# Patient Record
Sex: MALE | Race: BLACK OR AFRICAN AMERICAN | NOT HISPANIC OR LATINO | Employment: FULL TIME | URBAN - NONMETROPOLITAN AREA
[De-identification: names, ages, dates, MRNs, and addresses within clinical notes are randomized per-mention and may not be internally consistent; named-entity substitution may affect disease eponyms.]

---

## 2017-02-21 RX ORDER — OMEPRAZOLE 40 MG/1
CAPSULE, DELAYED RELEASE ORAL
Qty: 30 CAPSULE | Refills: 0 | Status: SHIPPED | OUTPATIENT
Start: 2017-02-21 | End: 2017-03-22 | Stop reason: SDUPTHER

## 2017-03-22 RX ORDER — OMEPRAZOLE 40 MG/1
CAPSULE, DELAYED RELEASE ORAL
Qty: 30 CAPSULE | Refills: 0 | Status: SHIPPED | OUTPATIENT
Start: 2017-03-22 | End: 2017-04-21 | Stop reason: SDUPTHER

## 2017-04-21 RX ORDER — OMEPRAZOLE 40 MG/1
CAPSULE, DELAYED RELEASE ORAL
Qty: 30 CAPSULE | Refills: 0 | Status: SHIPPED | OUTPATIENT
Start: 2017-04-21 | End: 2017-05-02

## 2017-05-24 RX ORDER — OMEPRAZOLE 40 MG/1
CAPSULE, DELAYED RELEASE ORAL
Qty: 30 CAPSULE | Refills: 0 | Status: SHIPPED | OUTPATIENT
Start: 2017-05-24 | End: 2017-06-23 | Stop reason: SDUPTHER

## 2017-06-19 ENCOUNTER — OFFICE VISIT (OUTPATIENT)
Dept: FAMILY MEDICINE CLINIC | Facility: CLINIC | Age: 52
End: 2017-06-19

## 2017-06-19 VITALS
DIASTOLIC BLOOD PRESSURE: 80 MMHG | BODY MASS INDEX: 30.31 KG/M2 | TEMPERATURE: 98.2 F | HEART RATE: 72 BPM | SYSTOLIC BLOOD PRESSURE: 120 MMHG | WEIGHT: 262 LBS | HEIGHT: 78 IN

## 2017-06-19 DIAGNOSIS — I10 ESSENTIAL HYPERTENSION: ICD-10-CM

## 2017-06-19 DIAGNOSIS — E83.42 HYPOMAGNESEMIA: ICD-10-CM

## 2017-06-19 DIAGNOSIS — E86.0 DEHYDRATION, MILD: ICD-10-CM

## 2017-06-19 DIAGNOSIS — F17.200 TOBACCO DEPENDENCE SYNDROME: ICD-10-CM

## 2017-06-19 DIAGNOSIS — I48.0 PAROXYSMAL ATRIAL FIBRILLATION (HCC): Primary | ICD-10-CM

## 2017-06-19 LAB
ALBUMIN SERPL-MCNC: 4.4 G/DL (ref 3.2–5.5)
ALBUMIN/GLOB SERPL: 1.2 G/DL (ref 1–3)
ALP SERPL-CCNC: 73 U/L (ref 15–121)
ALT SERPL W P-5'-P-CCNC: 38 U/L (ref 10–60)
ANION GAP SERPL CALCULATED.3IONS-SCNC: 9 MMOL/L (ref 5–15)
AST SERPL-CCNC: 27 U/L (ref 10–60)
BILIRUB SERPL-MCNC: 0.4 MG/DL (ref 0.2–1)
BUN BLD-MCNC: 17 MG/DL (ref 8–25)
BUN/CREAT SERPL: 14.2 (ref 7–25)
CALCIUM SPEC-SCNC: 10.3 MG/DL (ref 8.4–10.8)
CHLORIDE SERPL-SCNC: 101 MMOL/L (ref 100–112)
CO2 SERPL-SCNC: 28 MMOL/L (ref 20–32)
CREAT BLD-MCNC: 1.2 MG/DL (ref 0.4–1.3)
GFR SERPL CREATININE-BSD FRML MDRD: 78 ML/MIN/1.73 (ref 56–130)
GLOBULIN UR ELPH-MCNC: 3.6 GM/DL (ref 2.5–4.6)
GLUCOSE BLD-MCNC: 101 MG/DL (ref 70–100)
MAGNESIUM SERPL-MCNC: 1.8 MG/DL (ref 1.8–2.5)
POTASSIUM BLD-SCNC: 4.1 MMOL/L (ref 3.4–5.4)
PROT SERPL-MCNC: 8 G/DL (ref 6.7–8.2)
SODIUM BLD-SCNC: 138 MMOL/L (ref 134–146)

## 2017-06-19 PROCEDURE — 83735 ASSAY OF MAGNESIUM: CPT | Performed by: INTERNAL MEDICINE

## 2017-06-19 PROCEDURE — 36415 COLL VENOUS BLD VENIPUNCTURE: CPT | Performed by: INTERNAL MEDICINE

## 2017-06-19 PROCEDURE — 80053 COMPREHEN METABOLIC PANEL: CPT | Performed by: INTERNAL MEDICINE

## 2017-06-19 PROCEDURE — 99214 OFFICE O/P EST MOD 30 MIN: CPT | Performed by: INTERNAL MEDICINE

## 2017-06-19 RX ORDER — DILTIAZEM HYDROCHLORIDE 120 MG/1
120 CAPSULE, COATED, EXTENDED RELEASE ORAL DAILY
COMMUNITY
End: 2017-08-02 | Stop reason: DRUGHIGH

## 2017-06-19 RX ORDER — VALSARTAN 320 MG/1
320 TABLET ORAL DAILY
COMMUNITY
End: 2018-09-21 | Stop reason: ALTCHOICE

## 2017-06-19 NOTE — PATIENT INSTRUCTIONS
Steps to Quit Smoking   Smoking tobacco can be harmful to your health and can affect almost every organ in your body. Smoking puts you, and those around you, at risk for developing many serious chronic diseases. Quitting smoking is difficult, but it is one of the best things that you can do for your health. It is never too late to quit.  WHAT ARE THE BENEFITS OF QUITTING SMOKING?  When you quit smoking, you lower your risk of developing serious diseases and conditions, such as:  · Lung cancer or lung disease, such as COPD.  · Heart disease.  · Stroke.  · Heart attack.  · Infertility.  · Osteoporosis and bone fractures.  Additionally, symptoms such as coughing, wheezing, and shortness of breath may get better when you quit. You may also find that you get sick less often because your body is stronger at fighting off colds and infections. If you are pregnant, quitting smoking can help to reduce your chances of having a baby of low birth weight.  HOW DO I GET READY TO QUIT?  When you decide to quit smoking, create a plan to make sure that you are successful. Before you quit:  · Pick a date to quit. Set a date within the next two weeks to give you time to prepare.  · Write down the reasons why you are quitting. Keep this list in places where you will see it often, such as on your bathroom mirror or in your car or wallet.  · Identify the people, places, things, and activities that make you want to smoke (triggers) and avoid them. Make sure to take these actions:    Throw away all cigarettes at home, at work, and in your car.    Throw away smoking accessories, such as ashtrays and lighters.    Clean your car and make sure to empty the ashtray.    Clean your home, including curtains and carpets.  · Tell your family, friends, and coworkers that you are quitting. Support from your loved ones can make quitting easier.  · Talk with your health care provider about your options for quitting smoking.  · Find out what treatment  "options are covered by your health insurance.  WHAT STRATEGIES CAN I USE TO QUIT SMOKING?   Talk with your healthcare provider about different strategies to quit smoking. Some strategies include:  · Quitting smoking altogether instead of gradually lessening how much you smoke over a period of time. Research shows that quitting \"cold turkey\" is more successful than gradually quitting.  · Attending in-person counseling to help you build problem-solving skills. You are more likely to have success in quitting if you attend several counseling sessions. Even short sessions of 10 minutes can be effective.  · Finding resources and support systems that can help you to quit smoking and remain smoke-free after you quit. These resources are most helpful when you use them often. They can include:    Online chats with a counselor.    Telephone quitlines.    Printed self-help materials.    Support groups or group counseling.    Text messaging programs.    Mobile phone applications.  · Taking medicines to help you quit smoking. (If you are pregnant or breastfeeding, talk with your health care provider first.) Some medicines contain nicotine and some do not. Both types of medicines help with cravings, but the medicines that include nicotine help to relieve withdrawal symptoms. Your health care provider may recommend:    Nicotine patches, gum, or lozenges.    Nicotine inhalers or sprays.    Non-nicotine medicine that is taken by mouth.  Talk with your health care provider about combining strategies, such as taking medicines while you are also receiving in-person counseling. Using these two strategies together makes you more likely to succeed in quitting than if you used either strategy on its own.  If you are pregnant or breastfeeding, talk with your health care provider about finding counseling or other support strategies to quit smoking. Do not take medicine to help you quit smoking unless told to do so by your health care " provider.  WHAT THINGS CAN I DO TO MAKE IT EASIER TO QUIT?  Quitting smoking might feel overwhelming at first, but there is a lot that you can do to make it easier. Take these important actions:  · Reach out to your family and friends and ask that they support and encourage you during this time. Call telephone quitlines, reach out to support groups, or work with a counselor for support.  · Ask people who smoke to avoid smoking around you.  · Avoid places that trigger you to smoke, such as bars, parties, or smoke-break areas at work.  · Spend time around people who do not smoke.  · Lessen stress in your life, because stress can be a smoking trigger for some people. To lessen stress, try:    Exercising regularly.    Deep-breathing exercises.    Yoga.    Meditating.    Performing a body scan. This involves closing your eyes, scanning your body from head to toe, and noticing which parts of your body are particularly tense. Purposefully relax the muscles in those areas.  · Download or purchase mobile phone or tablet apps (applications) that can help you stick to your quit plan by providing reminders, tips, and encouragement. There are many free apps, such as QuitGuide from the CDC (Centers for Disease Control and Prevention). You can find other support for quitting smoking (smoking cessation) through smokefree.gov and other websites.  HOW WILL I FEEL WHEN I QUIT SMOKING?  Within the first 24 hours of quitting smoking, you may start to feel some withdrawal symptoms. These symptoms are usually most noticeable 2-3 days after quitting, but they usually do not last beyond 2-3 weeks. Changes or symptoms that you might experience include:  · Mood swings.  · Restlessness, anxiety, or irritation.  · Difficulty concentrating.  · Dizziness.  · Strong cravings for sugary foods in addition to nicotine.  · Mild weight gain.  · Constipation.  · Nausea.  · Coughing or a sore throat.  · Changes in how your medicines work in your  body.  · A depressed mood.  · Difficulty sleeping (insomnia).  After the first 2-3 weeks of quitting, you may start to notice more positive results, such as:  · Improved sense of smell and taste.  · Decreased coughing and sore throat.  · Slower heart rate.  · Lower blood pressure.  · Clearer skin.  · The ability to breathe more easily.  · Fewer sick days.  Quitting smoking is very challenging for most people. Do not get discouraged if you are not successful the first time. Some people need to make many attempts to quit before they achieve long-term success. Do your best to stick to your quit plan, and talk with your health care provider if you have any questions or concerns.     This information is not intended to replace advice given to you by your health care provider. Make sure you discuss any questions you have with your health care provider.     Document Released: 12/12/2002 Document Revised: 05/03/2016 Document Reviewed: 05/03/2016  [employer] Interactive Patient Education ©2017 [employer]

## 2017-06-19 NOTE — PROGRESS NOTES
Subjective        History of Present Illness     Praveen Noonan is a 52 y.o. male who is here for a hospital follow up.  He was treated for paroxsymal atrial fibrillation with RVR/dehydration.  He was discharged 06/12/17 on Cardizem  mg daily after receiving IV fluids and Cardizem drip.  He reports his rate has stayed in his 70s since discharge.  He states his job is stressful and he works in extreme [place].  He has appointments schedules with Dr. Aguila on August 7th and Dr. Hillman scheduled for June 26th.  He reports Dr. Hillman stopped his Zocor.  I asked him to verify that he wanted him to discontinue his statin medication.   He reports hypomagnesemia as well.      He reports he is 4 or 5 cigarettes daily.  I advised the patient of the risks in continuing to use tobacco products.  Patient is urged to stop smoking and never start again. I encourage him to pick a stop date and stop completely.         Review of Systems   Constitutional: Negative for chills, fatigue and fever.   HENT: Negative for congestion, ear pain, postnasal drip, sinus pressure and sore throat.    Respiratory: Negative for cough, shortness of breath and wheezing.    Cardiovascular: Negative for chest pain, palpitations and leg swelling.   Gastrointestinal: Negative for abdominal pain, blood in stool, constipation, diarrhea, nausea and vomiting.   Endocrine: Negative for cold intolerance, heat intolerance, polydipsia and polyuria.   Genitourinary: Negative for dysuria, frequency, hematuria and urgency.   Skin: Negative for rash.   Neurological: Negative for syncope and weakness.      PHQ-9 Depression Screening 6/19/2017   Little interest or pleasure in doing things 0   Feeling down, depressed, or hopeless 0   Trouble falling or staying asleep, or sleeping too much 0   Feeling tired or having little energy 0   Poor appetite or overeating 0   Feeling bad about yourself - or that you are a failure or have let yourself or your family  "down 0   Trouble concentrating on things, such as reading the newspaper or watching television 0   Moving or speaking so slowly that other people could have noticed. Or the opposite - being so fidgety or restless that you have been moving around a lot more than usual 0   Thoughts that you would be better off dead, or of hurting yourself in some way 0   PHQ-9 Total Score 0   If you checked off any problems, how difficult have these problems made it for you to do your work, take care of things at home, or get along with other people? Not difficult at all       Objective     Vitals:    06/19/17 0824   BP: 120/80   Pulse: 72   Temp: 98.2 °F (36.8 °C)   TempSrc: Oral   Weight: 262 lb (119 kg)   Height: 78\" (198.1 cm)       Physical Exam   Constitutional: He is oriented to person, place, and time. He appears well-developed and well-nourished. No distress.   HENT:   Head: Normocephalic and atraumatic.   Nose: Right sinus exhibits no maxillary sinus tenderness and no frontal sinus tenderness. Left sinus exhibits no maxillary sinus tenderness and no frontal sinus tenderness.   Mouth/Throat: Uvula is midline, oropharynx is clear and moist and mucous membranes are normal. No oral lesions. No tonsillar exudate.   Eyes: Conjunctivae and EOM are normal. Pupils are equal, round, and reactive to light.   Neck: Trachea normal. Neck supple. No JVD present. Carotid bruit is not present. No tracheal deviation present. No thyroid mass and no thyromegaly present.   Cardiovascular: Normal rate, regular rhythm and normal heart sounds.   No extrasystoles are present. PMI is not displaced.    No murmur heard.  Pulmonary/Chest: Effort normal and breath sounds normal. No accessory muscle usage. No respiratory distress. He has no decreased breath sounds. He has no wheezes. He has no rhonchi. He has no rales.   Abdominal: Soft. Bowel sounds are normal. He exhibits no distension. There is no hepatosplenomegaly. There is no tenderness.       " Vascular Status -  His exam exhibits right foot vasculature normal. His exam exhibits no right foot edema. His exam exhibits left foot vasculature normal. His exam exhibits no left foot edema.  Lymphadenopathy:     He has no cervical adenopathy.   Neurological: He is alert and oriented to person, place, and time. No cranial nerve deficit. Coordination normal.   Skin: Skin is warm, dry and intact. No rash noted. No cyanosis. Nails show no clubbing.   Psychiatric: He has a normal mood and affect. His speech is normal and behavior is normal. Thought content normal.   Vitals reviewed.    Future Appointments  Date Time Provider Department Center   6/19/2017 8:50 AM [facility] None   9/12/2017 9:00 AM Juarez Angel MD [facility] None       Assessment/Plan      He will stop by the lab today to have a CMP and magnesium only to re-evaluate renal function and hypomagnesemia.      I asked him to verify with Dr. Hillman, cardiologist, about stopping his statin medication.      He will return to work tomorrow.  I recommended he make sure he stay hydrated well.  He reports he alternates Gatorade/water.  He will continue this.      I advised the patient of the risks in continuing to use tobacco products.  Patient is urged to stop smoking and never start again.     He will return in September for follow up on chronic conditions with fasting labs one week prior.      Scribed for Dr. Angel by Vandana Gomes [facility].     Diagnoses and all orders for this visit:    Paroxysmal atrial fibrillation  -     Comprehensive Metabolic Panel  -     Magnesium    Essential hypertension  -     Comprehensive Metabolic Panel  -     Magnesium    Tobacco dependence syndrome    Dehydration, mild  -     Comprehensive Metabolic Panel  -     Magnesium    Hypomagnesemia  -     Comprehensive Metabolic Panel  -     Magnesium    Other orders  -     diltiaZEM CD (CARTIA XT) 120 MG 24 hr capsule; Take 120 mg by mouth Daily.  -     valsartan  (DIOVAN) 320 MG tablet; Take 320 mg by mouth Daily.      No visits with results within 3 Week(s) from this visit.  Latest known visit with results is:    Hospital Outpatient Visit on 09/06/2016   Component Date Value Ref Range Status   • WBC 09/06/2016 6.7  3.2 - 9.8 x1000/uL Final   • RBC 09/06/2016 4.62  4.37 - 5.74 christina/mm3 Final   • Hemoglobin 09/06/2016 14.2  13.7 - 17.3 gm/dl Final   • Hematocrit 09/06/2016 41.8  39.0 - 49.0 % Final   • MCV 09/06/2016 90.5  80.0 - 98.0 fl Final   • MCH 09/06/2016 30.7  26.0 - 34.0 pg Final   • MCHC 09/06/2016 34.0  31.5 - 36.3 gm/dl Final   • Platelets 09/06/2016 212  150 - 450 x1000/mm3 Final   • RDW 09/06/2016 13.8  11.5 - 14.5 % Final   • MPV 09/06/2016 9.1  8.0 - 12.0 fl Final   • Neutrophil Rel % 09/06/2016 32.4* 37.0 - 80.0 % Final   • Lymphocyte Rel % 09/06/2016 54.1* 10.0 - 50.0 % Final   • Monocyte Rel % 09/06/2016 10.1  0.0 - 12.0 % Final   • Eosinophil Rel % 09/06/2016 2.8  0.0 - 7.0 % Final   • Basophil Rel % 09/06/2016 0.6  0.0 - 2.0 % Final   • nRBC 09/06/2016 0   Final   • nRBC 09/06/2016 0   Final   • Total Cholesterol 09/06/2016 156  150 - 200 mg/dl Final    CHOL DESIRED: < 200 MG/DL   • Triglycerides 09/06/2016 72  35 - 160 mg/dl Final    TRIG DESIRED: < 200 MG/DL   • HDL Cholesterol 09/06/2016 33.8* 35.0 - 100.0 mg/dl Final    HDL HIGH RISK: < 35 MG/DL   • LDL Cholesterol  09/06/2016 108  mg/dl Final    Comment: LDL DESIRED: < 130 MG/DL  LDL DESIRED: < 130 MG/DL     • Glucose 09/06/2016 109* 70.0 - 100.0 mg/dl Final   • BUN 09/06/2016 18  8.0 - 25.0 mg/dl Final   • Creatinine 09/06/2016 1.1  0.4 - 1.3 mg/dl Final   • Sodium 09/06/2016 139.0  134 - 146 mmol/L Final   • Potassium 09/06/2016 3.6  3.4 - 5.4 mmol/L Final   • Chloride 09/06/2016 103.0  100.0 - 112.0 mmol/L Final   • CO2 09/06/2016 29.0  20.0 - 32.0 mmol/L Final   • Calcium 09/06/2016 9.8  8.4 - 10.8 mg/dl Final   • Total Protein 09/06/2016 7.6  6.7 - 8.2 gm/dl Final   • Albumin 09/06/2016 4.0  3.2  - 5.5 gm/dl Final   • Total Bilirubin 09/06/2016 0.6  0.2 - 1.0 mg/dl Final   • Alkaline Phosphatase 09/06/2016 96  15 - 121 U/L Final   • ALT (SGPT) 09/06/2016 27  10 - 60 U/L Final   • AST (SGOT) 09/06/2016 25  10 - 60 U/L Final   • GFR MDRD Non  09/06/2016 71  63 - 147 mL/min/1.73 sq.M Final    Comment: Invalid if creatinine is changing or the patient is on dialysis. Use AA  result if patient is -American, non AA result otherwise.     • GFR MDRD  09/06/2016 86  63 - 147 mL/min/1.73 sq.M Final   • Anion Gap 09/06/2016 7.0  5.0 - 15.0 mmol/L Final   • Neutrophil Rel % 09/06/2016 35* 37 - 80 % Final   • Lymphocyte Rel % 09/06/2016 52* 10 - 50 % Final   • Monocyte Rel % 09/06/2016 9  0 - 12 % Final   • Eosinophil Rel % 09/06/2016 2  0 - 7 % Final   • Basophil Rel % 09/06/2016 1  0 - 2 % Final   • Atypical Lymphocytes Rel % 09/06/2016 1* 0 - 0 % Final   • RBC Morphology 09/06/2016 Normocytic Normochromic   Final   • Platelet Estimate 09/06/2016 Normal   Final   • Total Counted 09/06/2016 100   Final   ]

## 2017-06-23 RX ORDER — OMEPRAZOLE 40 MG/1
CAPSULE, DELAYED RELEASE ORAL
Qty: 30 CAPSULE | Refills: 0 | Status: SHIPPED | OUTPATIENT
Start: 2017-06-23 | End: 2017-07-24 | Stop reason: SDUPTHER

## 2017-07-24 RX ORDER — OMEPRAZOLE 40 MG/1
CAPSULE, DELAYED RELEASE ORAL
Qty: 30 CAPSULE | Refills: 11 | Status: SHIPPED | OUTPATIENT
Start: 2017-07-24 | End: 2018-07-27 | Stop reason: SDUPTHER

## 2017-08-02 ENCOUNTER — OFFICE VISIT (OUTPATIENT)
Dept: FAMILY MEDICINE CLINIC | Facility: CLINIC | Age: 52
End: 2017-08-02

## 2017-08-02 VITALS
HEIGHT: 78 IN | WEIGHT: 267.4 LBS | DIASTOLIC BLOOD PRESSURE: 70 MMHG | HEART RATE: 80 BPM | BODY MASS INDEX: 30.94 KG/M2 | SYSTOLIC BLOOD PRESSURE: 120 MMHG | TEMPERATURE: 98.2 F

## 2017-08-02 DIAGNOSIS — N52.03 COMBINED ARTERIAL INSUFFICIENCY AND CORPORO-VENOUS OCCLUSIVE ERECTILE DYSFUNCTION: ICD-10-CM

## 2017-08-02 DIAGNOSIS — M17.0 PRIMARY OSTEOARTHRITIS OF BOTH KNEES: Primary | Chronic | ICD-10-CM

## 2017-08-02 DIAGNOSIS — I10 ESSENTIAL HYPERTENSION: Chronic | ICD-10-CM

## 2017-08-02 DIAGNOSIS — I48.0 PAROXYSMAL ATRIAL FIBRILLATION (HCC): Chronic | ICD-10-CM

## 2017-08-02 DIAGNOSIS — K21.9 GASTROESOPHAGEAL REFLUX DISEASE WITHOUT ESOPHAGITIS: Chronic | ICD-10-CM

## 2017-08-02 PROBLEM — G47.33 OBSTRUCTIVE SLEEP APNEA SYNDROME: Chronic | Status: ACTIVE | Noted: 2017-08-02

## 2017-08-02 PROCEDURE — 99213 OFFICE O/P EST LOW 20 MIN: CPT | Performed by: INTERNAL MEDICINE

## 2017-08-02 RX ORDER — SILDENAFIL CITRATE 20 MG/1
TABLET ORAL
Qty: 15 TABLET | Refills: 6 | Status: SHIPPED | OUTPATIENT
Start: 2017-08-02 | End: 2022-03-14 | Stop reason: DRUGHIGH

## 2017-08-02 RX ORDER — DILTIAZEM HYDROCHLORIDE 180 MG/1
1 CAPSULE, EXTENDED RELEASE ORAL DAILY
Refills: 1 | COMMUNITY
Start: 2017-07-27 | End: 2018-09-21 | Stop reason: DRUGHIGH

## 2017-08-02 RX ORDER — NAPROXEN 375 MG/1
375 TABLET ORAL 2 TIMES DAILY WITH MEALS
Qty: 40 TABLET | Refills: 0 | Status: SHIPPED | OUTPATIENT
Start: 2017-08-02 | End: 2018-05-23

## 2017-08-02 RX ORDER — DIGOXIN 250 MCG
1 TABLET ORAL DAILY
Refills: 1 | COMMUNITY
Start: 2017-07-27 | End: 2019-04-08

## 2017-08-02 NOTE — PROGRESS NOTES
Subjective        History of Present Illness     Praveen Noonan is a 52 y.o. male with history of osteoarthritis of the knees.  He is reporting gradually improving right knee pain and right lower extremity edema, which started on Sunday after he built a deck on the house.  He was doing a lot of squatting.  Pain and swelling symptoms have been improving for the past 2 days    He was seen by Dr. Gil, pulmonologist, for symptoms of sleep apnea.  A home sleep study was done two nights ago, but the test was incomplete due to the pulse ox not fitting properly.  He will need to repeat the home sleep study test.      He was seen by Dr. Hillman, cardiologist, last month for his paroxysmal a-fib.  He is on Xarelto.   I had asked him to verify with Dr. Hillman, cardiologist, about stopping his statin medication.   He forgot to ask about this.  He has an appointment next month with labs a week prior.  We will re-evaluate lipid panel next month.    He is reporting symptoms of erectile dysfunction.  He is having difficulty obtaining erection.  He is given a prescription for sidenafil.  He is to notify myself or Dr. Hillman for any chest pain symptoms during intercourse.              Review of Systems   Constitutional: Negative for chills, fatigue and fever.   HENT: Negative for congestion, ear pain, postnasal drip, sinus pressure and sore throat.    Respiratory: Negative for cough, shortness of breath and wheezing.    Cardiovascular: Negative for chest pain, palpitations and leg swelling.   Gastrointestinal: Negative for abdominal pain, blood in stool, constipation, diarrhea, nausea and vomiting.   Endocrine: Negative for cold intolerance, heat intolerance, polydipsia and polyuria.   Genitourinary: Negative for dysuria, frequency, hematuria and urgency.   Musculoskeletal:        Right knee pain.    Skin: Negative for rash.   Neurological: Negative for syncope and weakness.        Objective     Vitals:    08/02/17 1519   BP:  "120/70   Pulse: 80   Temp: 98.2 °F (36.8 °C)   TempSrc: Oral   Weight: 267 lb 6.4 oz (121 kg)   Height: 78\" (198.1 cm)     Physical Exam   Constitutional: He is oriented to person, place, and time. He appears well-developed and well-nourished. No distress.   Neck: Neck supple. No JVD present. No thyromegaly present.   Cardiovascular: Normal rate, regular rhythm and normal heart sounds.    Pulmonary/Chest: Effort normal and breath sounds normal. No accessory muscle usage. No respiratory distress. He has no wheezes. He has no rales.   Abdominal: Soft. Bowel sounds are normal. He exhibits no distension. There is no tenderness.   Musculoskeletal: He exhibits no edema.   Exam of the right knee reveals negative drawer sign.  Crepitus of the right knee.  No medial or lateral instability.  No quadriceps inhibition.  Fullness in the popliteal area, which may be consistent with an early/mild Baker's cyst.  There is some trace edema distal to the right knee extending down to the ankle, which he reports to be improving.     Lymphadenopathy:     He has no cervical adenopathy.   Neurological: He is alert and oriented to person, place, and time. No cranial nerve deficit.   Psychiatric: He has a normal mood and affect. His speech is normal and behavior is normal.     Future Appointments  Date Time Provider Department Center   9/12/2017 9:00 AM Juarez Angel MD [facility] None       Assessment/Plan      He is given a prescription for short course of Naproxen 375 mg to take 1 tablet by mouth 1-2 (Two) Times a Day With Meals for a short interval, only until the current pain and swelling is improved.  Elevation and ice for symptom relief.  Avoid frequent or chronic use of NSAIDs due to also being on Xarelto.  Continue omeprazole for control of GERD symptoms, particularly while taking the naproxen sodium.    He is given a prescription for sildenafil (REVATIO) 20 MG tablet to take 3 tablets 1 hour prior to intercourse. Notify myself or " Dr. Hillman should he experience any chest pain with intercourse.       He will repeat the home sleep study.      Continue his current blood pressure medications.  Continue to pursue sodium restriction and the additional weight loss.    Return to clinic soon as previous as scheduled with fasting labs prior.     Scribed for Dr. Angel by Vandana Gomes [facility].     Diagnoses and all orders for this visit:    Primary osteoarthritis of both knees -right worse than left    Essential hypertension    Paroxysmal atrial fibrillation - S/P pathway ablation.  Dr. Hillman follows    Gastroesophageal reflux disease without esophagitis    Other orders  -     digoxin (LANOXIN) 250 MCG tablet; Take 1 tablet by mouth Daily.  -     CARTIA  MG 24 hr capsule; Take 1 capsule by mouth Daily.  -     naproxen (NAPROSYN) 375 MG tablet; Take 1 tablet by mouth 2 (Two) Times a Day With Meals.  -     sildenafil (REVATIO) 20 MG tablet; 3 tablets 1 hour prior to intercourse      No visits with results within 3 Week(s) from this visit.  Latest known visit with results is:    Office Visit on 06/19/2017   Component Date Value Ref Range Status   • Glucose 06/19/2017 101* 70 - 100 mg/dL Final   • BUN 06/19/2017 17  8 - 25 mg/dL Final   • Creatinine 06/19/2017 1.20  0.40 - 1.30 mg/dL Final   • Sodium 06/19/2017 138  134 - 146 mmol/L Final   • Potassium 06/19/2017 4.1  3.4 - 5.4 mmol/L Final   • Chloride 06/19/2017 101  100 - 112 mmol/L Final   • CO2 06/19/2017 28.0  20.0 - 32.0 mmol/L Final   • Calcium 06/19/2017 10.3  8.4 - 10.8 mg/dL Final   • Total Protein 06/19/2017 8.0  6.7 - 8.2 g/dL Final   • Albumin 06/19/2017 4.40  3.20 - 5.50 g/dL Final   • ALT (SGPT) 06/19/2017 38  10 - 60 U/L Final   • AST (SGOT) 06/19/2017 27  10 - 60 U/L Final   • Alkaline Phosphatase 06/19/2017 73  15 - 121 U/L Final   • Total Bilirubin 06/19/2017 0.4  0.2 - 1.0 mg/dL Final   • eGFR   Amer 06/19/2017 78  56 - 130 mL/min/1.73 Final   • Globulin 06/19/2017 3.6   2.5 - 4.6 gm/dL Final   • A/G Ratio 06/19/2017 1.2  1.0 - 3.0 g/dL Final   • BUN/Creatinine Ratio 06/19/2017 14.2  7.0 - 25.0 Final   • Anion Gap 06/19/2017 9.0  5.0 - 15.0 mmol/L Final   • Magnesium 06/19/2017 1.8  1.8 - 2.5 mg/dL Final   ]

## 2017-09-12 ENCOUNTER — LAB (OUTPATIENT)
Dept: LAB | Facility: OTHER | Age: 52
End: 2017-09-12

## 2017-09-12 DIAGNOSIS — E78.5 HYPERLIPIDEMIA: ICD-10-CM

## 2017-09-12 DIAGNOSIS — Z12.5 SPECIAL SCREENING FOR MALIGNANT NEOPLASM OF PROSTATE: ICD-10-CM

## 2017-09-12 LAB
ALBUMIN SERPL-MCNC: 3.9 G/DL (ref 3.2–5.5)
ALBUMIN/GLOB SERPL: 1.2 G/DL (ref 1–3)
ALP SERPL-CCNC: 96 U/L (ref 15–121)
ALT SERPL W P-5'-P-CCNC: 41 U/L (ref 10–60)
ANION GAP SERPL CALCULATED.3IONS-SCNC: 9 MMOL/L (ref 5–15)
AST SERPL-CCNC: 30 U/L (ref 10–60)
BASOPHILS # BLD AUTO: 0.05 10*3/MM3 (ref 0–0.2)
BASOPHILS NFR BLD AUTO: 0.6 % (ref 0–2)
BILIRUB SERPL-MCNC: 0.5 MG/DL (ref 0.2–1)
BUN BLD-MCNC: 17 MG/DL (ref 8–25)
BUN/CREAT SERPL: 14.2 (ref 7–25)
CALCIUM SPEC-SCNC: 9.6 MG/DL (ref 8.4–10.8)
CHLORIDE SERPL-SCNC: 102 MMOL/L (ref 100–112)
CHOLEST SERPL-MCNC: 218 MG/DL (ref 150–200)
CO2 SERPL-SCNC: 28 MMOL/L (ref 20–32)
CREAT BLD-MCNC: 1.2 MG/DL (ref 0.4–1.3)
DEPRECATED RDW RBC AUTO: 45.5 FL (ref 35.1–43.9)
EOSINOPHIL # BLD AUTO: 0.19 10*3/MM3 (ref 0–0.7)
EOSINOPHIL NFR BLD AUTO: 2.4 % (ref 0–7)
ERYTHROCYTE [DISTWIDTH] IN BLOOD BY AUTOMATED COUNT: 13.6 % (ref 11.5–14.5)
GFR SERPL CREATININE-BSD FRML MDRD: 78 ML/MIN/1.73 (ref 56–130)
GLOBULIN UR ELPH-MCNC: 3.3 GM/DL (ref 2.5–4.6)
GLUCOSE BLD-MCNC: 117 MG/DL (ref 70–100)
HCT VFR BLD AUTO: 40.2 % (ref 39–49)
HDLC SERPL-MCNC: 28 MG/DL (ref 35–100)
HGB BLD-MCNC: 13.3 G/DL (ref 13.7–17.3)
LDLC SERPL CALC-MCNC: 151 MG/DL
LDLC/HDLC SERPL: 5.41 {RATIO}
LYMPHOCYTES # BLD AUTO: 3.37 10*3/MM3 (ref 0.6–4.2)
LYMPHOCYTES NFR BLD AUTO: 41.8 % (ref 10–50)
MCH RBC QN AUTO: 30.9 PG (ref 26.5–34)
MCHC RBC AUTO-ENTMCNC: 33.1 G/DL (ref 31.5–36.3)
MCV RBC AUTO: 93.5 FL (ref 80–98)
MONOCYTES # BLD AUTO: 0.83 10*3/MM3 (ref 0–0.9)
MONOCYTES NFR BLD AUTO: 10.3 % (ref 0–12)
NEUTROPHILS # BLD AUTO: 3.63 10*3/MM3 (ref 2–8.6)
NEUTROPHILS NFR BLD AUTO: 44.9 % (ref 37–80)
PLATELET # BLD AUTO: 297 10*3/MM3 (ref 150–450)
PMV BLD AUTO: 8.6 FL (ref 8–12)
POTASSIUM BLD-SCNC: 4.1 MMOL/L (ref 3.4–5.4)
PROT SERPL-MCNC: 7.2 G/DL (ref 6.7–8.2)
PSA SERPL-MCNC: 0.13 NG/ML (ref 0–4)
RBC # BLD AUTO: 4.3 10*6/MM3 (ref 4.37–5.74)
SODIUM BLD-SCNC: 139 MMOL/L (ref 134–146)
TRIGL SERPL-MCNC: 193 MG/DL (ref 35–160)
VLDLC SERPL-MCNC: 38.6 MG/DL
WBC NRBC COR # BLD: 8.07 10*3/MM3 (ref 3.2–9.8)

## 2017-09-12 PROCEDURE — 80061 LIPID PANEL: CPT | Performed by: INTERNAL MEDICINE

## 2017-09-12 PROCEDURE — 84153 ASSAY OF PSA TOTAL: CPT | Performed by: INTERNAL MEDICINE

## 2017-09-12 PROCEDURE — 80053 COMPREHEN METABOLIC PANEL: CPT | Performed by: INTERNAL MEDICINE

## 2017-09-12 PROCEDURE — 36415 COLL VENOUS BLD VENIPUNCTURE: CPT | Performed by: INTERNAL MEDICINE

## 2017-09-12 PROCEDURE — 85025 COMPLETE CBC W/AUTO DIFF WBC: CPT | Performed by: INTERNAL MEDICINE

## 2017-09-19 ENCOUNTER — OFFICE VISIT (OUTPATIENT)
Dept: FAMILY MEDICINE CLINIC | Facility: CLINIC | Age: 52
End: 2017-09-19

## 2017-09-19 VITALS
DIASTOLIC BLOOD PRESSURE: 80 MMHG | TEMPERATURE: 97.8 F | BODY MASS INDEX: 31.12 KG/M2 | SYSTOLIC BLOOD PRESSURE: 136 MMHG | HEIGHT: 78 IN | WEIGHT: 269 LBS | HEART RATE: 80 BPM

## 2017-09-19 DIAGNOSIS — Z12.5 SPECIAL SCREENING FOR MALIGNANT NEOPLASM OF PROSTATE: ICD-10-CM

## 2017-09-19 DIAGNOSIS — I10 ESSENTIAL HYPERTENSION: Chronic | ICD-10-CM

## 2017-09-19 DIAGNOSIS — M17.0 PRIMARY OSTEOARTHRITIS OF BOTH KNEES: Chronic | ICD-10-CM

## 2017-09-19 DIAGNOSIS — K21.9 GASTROESOPHAGEAL REFLUX DISEASE WITHOUT ESOPHAGITIS: Chronic | ICD-10-CM

## 2017-09-19 DIAGNOSIS — F17.200 TOBACCO DEPENDENCE SYNDROME: Chronic | ICD-10-CM

## 2017-09-19 DIAGNOSIS — E78.2 MIXED HYPERLIPIDEMIA: Primary | Chronic | ICD-10-CM

## 2017-09-19 DIAGNOSIS — R73.01 IFG (IMPAIRED FASTING GLUCOSE): Chronic | ICD-10-CM

## 2017-09-19 DIAGNOSIS — G47.33 OBSTRUCTIVE SLEEP APNEA SYNDROME: Chronic | ICD-10-CM

## 2017-09-19 DIAGNOSIS — E66.09 NON MORBID OBESITY DUE TO EXCESS CALORIES: Chronic | ICD-10-CM

## 2017-09-19 DIAGNOSIS — I48.0 PAROXYSMAL ATRIAL FIBRILLATION (HCC): Chronic | ICD-10-CM

## 2017-09-19 PROCEDURE — 99214 OFFICE O/P EST MOD 30 MIN: CPT | Performed by: INTERNAL MEDICINE

## 2017-09-19 RX ORDER — ATORVASTATIN CALCIUM 10 MG/1
10 TABLET, FILM COATED ORAL DAILY
Qty: 30 TABLET | Refills: 11 | Status: SHIPPED | OUTPATIENT
Start: 2017-09-19 | End: 2018-09-18 | Stop reason: SDUPTHER

## 2017-09-19 NOTE — PROGRESS NOTES
Subjective        History of Present Illness     Praveen Noonan is a 52 y.o. male here for annual follow up on hyperlipidemia, hypertension, tobacco dependence syndrome, and GERD. His cardiologist is Dr. Hillman who follows his paroxysmal a-fib, for which he continues on Xarelto.  He also follows with his electrophysiologist. GERD symptoms mostly adequately controlled with Prilosec with only occasional flares.      He reports his chronic osteoarthritc knee pain is relieved temporarily with applying ice.  He has been out of his naproxen, but he did notice an improvement in pain when he was taking it.  Due to being on chronic Xarelto, I recommended OTC naproxen 220 mg having him monitor for lower extremity edema.         He has decreased tobacco use and is currently smoking five cigarettes daily.  I encouraged him to go ahead and pick a stop date and completely stop smoking and never start again.  He could try some nicotine gum if needed.     I had asked him to verify with Dr. Hillman, cardiologist, about stopping his statin medication, Lipitor.  He forgot to ask about his cardiologist about this at his last cardiology visit, but review of his lipid panel reveals cholesterol is not adequately controlled without the statin.      He was seen by Dr. Gil, pulmonologist, for symptoms of sleep apnea.  A home sleep study was attempted three times, but the test was incomplete due to the pulse ox not fitting properly.  He is going to need to schedule an inpatient sleep study for accurate results.     He has regained the 21 pounds he had lost with last year's annual visit.  His weight is up 22 pounds in the past year.  Blood pressure is at goal.        The patient's relevant past medical, surgical, and social history was reviewed in Epic.   Lab results are reviewed with the patient today.  Fasting glucose 117.  Total cholesterol 218.  HDL 28.  .  Triglycerides 193. PSA normal limits. Liver function normal.  Renal  "function stable.       Review of Systems   Constitutional: Negative for chills, fatigue and fever.   HENT: Negative for congestion, ear pain, postnasal drip, sinus pressure and sore throat.    Respiratory: Negative for cough, shortness of breath and wheezing.    Cardiovascular: Negative for chest pain, palpitations and leg swelling.   Gastrointestinal: Negative for abdominal pain, blood in stool, constipation, diarrhea, nausea and vomiting.   Endocrine: Negative for cold intolerance, heat intolerance, polydipsia and polyuria.   Genitourinary: Negative for dysuria, frequency, hematuria and urgency.   Skin: Negative for rash.   Neurological: Negative for syncope and weakness.        Objective     Vitals:    09/19/17 1501   BP: 136/80   Pulse: 80   Temp: 97.8 °F (36.6 °C)   TempSrc: Oral   Weight: 269 lb (122 kg)   Height: 78\" (198.1 cm)     Physical Exam   Constitutional: He is oriented to person, place, and time. He appears well-developed and well-nourished. No distress.   Obese male.    HENT:   Head: Normocephalic and atraumatic.   Nose: Right sinus exhibits no maxillary sinus tenderness and no frontal sinus tenderness. Left sinus exhibits no maxillary sinus tenderness and no frontal sinus tenderness.   Mouth/Throat: Uvula is midline, oropharynx is clear and moist and mucous membranes are normal. No oral lesions. No tonsillar exudate.   Benign posterior smoker's changes.      Eyes: Conjunctivae and EOM are normal. Pupils are equal, round, and reactive to light.   Neck: Trachea normal. Neck supple. No JVD present. Carotid bruit is not present. No tracheal deviation present. No thyroid mass and no thyromegaly present.   Cardiovascular: Normal rate, regular rhythm and normal heart sounds.   No extrasystoles are present. PMI is not displaced.    No murmur heard.  Pulmonary/Chest: Effort normal and breath sounds normal. No accessory muscle usage. No respiratory distress. He has no decreased breath sounds. He has no " wheezes. He has no rhonchi. He has no rales.   Chronic lung sounds.    Abdominal: Soft. Bowel sounds are normal. He exhibits no distension. There is no hepatosplenomegaly. There is no tenderness.   Obese abdomen limits exam.        Vascular Status -  His exam exhibits right foot vasculature normal. His exam exhibits no right foot edema. His exam exhibits left foot vasculature normal. His exam exhibits no left foot edema.  Lymphadenopathy:     He has no cervical adenopathy.   Neurological: He is alert and oriented to person, place, and time. No cranial nerve deficit. Coordination normal.   Skin: Skin is warm, dry and intact. No rash noted. No cyanosis. Nails show no clubbing.   Psychiatric: He has a normal mood and affect. His speech is normal and behavior is normal. Thought content normal.   Vitals reviewed.      Assessment/Plan      We will have him restart Lipitor (atorvastatin) 10 mg one tablet daily.     Continue omeprazole 40 mg every morning.    Continue follow-up appointments with Dr. blount, cardiology.      I advised the patient of the risks in continuing to use tobacco products.  Patient is urged to stop smoking and never start again. He has cut back and is only smoking about five cigarettes daily.  I encouraged him to go ahead and pick a stop date and completely stop smoking and never start again.  He can use nicotine gum for cravings.      I recommended OTC naproxen 220 mg to take one to two tablets daily sparingly as needed for osteoarthritic knee pain.  Monitor for lower extremity edema.      Intensify diet, exercise, and weight loss efforts, especially reducing carbohydrates in the diet.  Written literature regarding diet, exercise, and weight loss included in patient's AVS today.       Continue other medications and vitamin and mineral supplements to treat additional medical problems which we addressed today.  He will return in one year for annual exam with fasting labs one week prior.      Included  a digoxin level with next year's labs due to the patient being on rather high-dose digoxin.  He denies any current side effects.     Scribed for Dr. Angel by Vandana Gomes [facility].     Diagnoses and all orders for this visit:    Mixed hyperlipidemia - resumed Lipitor, 9/2017  -     Lipid Panel; Future    Essential hypertension  -     CBC Auto Differential; Future  -     Comprehensive Metabolic Panel; Future  -     Hemoglobin A1c; Future  -     Lipid Panel; Future    Paroxysmal atrial fibrillation - followed by Dr. Hillman  -     Digoxin Level; Future    Obstructive sleep apnea syndrome - followed by Dr. Gil    Non morbid obesity due to excess calories    Gastroesophageal reflux disease without esophagitis    Primary osteoarthritis of both knees    Tobacco dependence syndrome    Special screening for malignant neoplasm of prostate  -     PSA Screen; Future    IFG (impaired fasting glucose)  -     Hemoglobin A1c; Future    Other orders  -     atorvastatin (LIPITOR) 10 MG tablet; Take 1 tablet by mouth Daily. For cholesterol      Lab on 09/12/2017   Component Date Value Ref Range Status   • Total Cholesterol 09/12/2017 218* 150 - 200 mg/dL Final   • Triglycerides 09/12/2017 193* 35 - 160 mg/dL Final   • HDL Cholesterol 09/12/2017 28* 35 - 100 mg/dL Final   • LDL Cholesterol  09/12/2017 151  mg/dL Final   • VLDL Cholesterol 09/12/2017 38.6  mg/dL Final   • LDL/HDL Ratio 09/12/2017 5.41   Final   • PSA 09/12/2017 0.132  0.000 - 4.000 ng/mL Final   • Glucose 09/12/2017 117* 70 - 100 mg/dL Final   • BUN 09/12/2017 17  8 - 25 mg/dL Final   • Creatinine 09/12/2017 1.20  0.40 - 1.30 mg/dL Final   • Sodium 09/12/2017 139  134 - 146 mmol/L Final   • Potassium 09/12/2017 4.1  3.4 - 5.4 mmol/L Final   • Chloride 09/12/2017 102  100 - 112 mmol/L Final   • CO2 09/12/2017 28.0  20.0 - 32.0 mmol/L Final   • Calcium 09/12/2017 9.6  8.4 - 10.8 mg/dL Final   • Total Protein 09/12/2017 7.2  6.7 - 8.2 g/dL Final   • Albumin  09/12/2017 3.90  3.20 - 5.50 g/dL Final   • ALT (SGPT) 09/12/2017 41  10 - 60 U/L Final   • AST (SGOT) 09/12/2017 30  10 - 60 U/L Final   • Alkaline Phosphatase 09/12/2017 96  15 - 121 U/L Final   • Total Bilirubin 09/12/2017 0.5  0.2 - 1.0 mg/dL Final   • eGFR   Amer 09/12/2017 78  56 - 130 mL/min/1.73 Final   • Globulin 09/12/2017 3.3  2.5 - 4.6 gm/dL Final   • A/G Ratio 09/12/2017 1.2  1.0 - 3.0 g/dL Final   • BUN/Creatinine Ratio 09/12/2017 14.2  7.0 - 25.0 Final   • Anion Gap 09/12/2017 9.0  5.0 - 15.0 mmol/L Final   • WBC 09/12/2017 8.07  3.20 - 9.80 10*3/mm3 Final   • RBC 09/12/2017 4.30* 4.37 - 5.74 10*6/mm3 Final   • Hemoglobin 09/12/2017 13.3* 13.7 - 17.3 g/dL Final   • Hematocrit 09/12/2017 40.2  39.0 - 49.0 % Final   • MCV 09/12/2017 93.5  80.0 - 98.0 fL Final   • MCH 09/12/2017 30.9  26.5 - 34.0 pg Final   • MCHC 09/12/2017 33.1  31.5 - 36.3 g/dL Final   • RDW 09/12/2017 13.6  11.5 - 14.5 % Final   • RDW-SD 09/12/2017 45.5* 35.1 - 43.9 fl Final   • MPV 09/12/2017 8.6  8.0 - 12.0 fL Final   • Platelets 09/12/2017 297  150 - 450 10*3/mm3 Final   • Neutrophil % 09/12/2017 44.9  37.0 - 80.0 % Final   • Lymphocyte % 09/12/2017 41.8  10.0 - 50.0 % Final   • Monocyte % 09/12/2017 10.3  0.0 - 12.0 % Final   • Eosinophil % 09/12/2017 2.4  0.0 - 7.0 % Final   • Basophil % 09/12/2017 0.6  0.0 - 2.0 % Final   • Neutrophils, Absolute 09/12/2017 3.63  2.00 - 8.60 10*3/mm3 Final   • Lymphocytes, Absolute 09/12/2017 3.37  0.60 - 4.20 10*3/mm3 Final   • Monocytes, Absolute 09/12/2017 0.83  0.00 - 0.90 10*3/mm3 Final   • Eosinophils, Absolute 09/12/2017 0.19  0.00 - 0.70 10*3/mm3 Final   • Basophils, Absolute 09/12/2017 0.05  0.00 - 0.20 10*3/mm3 Final   ]

## 2017-09-19 NOTE — PATIENT INSTRUCTIONS
Exercising to Lose Weight  Exercising can help you to lose weight. In order to lose weight through exercise, you need to do vigorous-intensity exercise. You can tell that you are exercising with vigorous intensity if you are breathing very hard and fast and cannot hold a conversation while exercising.  Moderate-intensity exercise helps to maintain your current weight. You can tell that you are exercising at a moderate level if you have a higher heart rate and faster breathing, but you are still able to hold a conversation.  HOW OFTEN SHOULD I EXERCISE?  Choose an activity that you enjoy and set realistic goals. Your health care provider can help you to make an activity plan that works for you. Exercise regularly as directed by your health care provider. This may include:  · Doing resistance training twice each week, such as:    Push-ups.    Sit-ups.    Lifting weights.    Using resistance bands.  · Doing a given intensity of exercise for a given amount of time. Choose from these options:    150 minutes of moderate-intensity exercise every week.    75 minutes of vigorous-intensity exercise every week.    A mix of moderate-intensity and vigorous-intensity exercise every week.  Children, pregnant women, people who are out of shape, people who are overweight, and older adults may need to consult a health care provider for individual recommendations. If you have any sort of medical condition, be sure to consult your health care provider before starting a new exercise program.  WHAT ARE SOME ACTIVITIES THAT CAN HELP ME TO LOSE WEIGHT?   · Walking at a rate of at least 4.5 miles an hour.  · Jogging or running at a rate of 5 miles per hour.  · Biking at a rate of at least 10 miles per hour.  · Lap swimming.  · Roller-skating or in-line skating.  · Cross-country skiing.  · Vigorous competitive sports, such as football, basketball, and soccer.  · Jumping rope.  · Aerobic dancing.  HOW CAN I BE MORE ACTIVE IN MY DAY-TO-DAY  ACTIVITIES?  · Use the stairs instead of the elevator.  · Take a walk during your lunch break.  · If you drive, park your car farther away from work or school.  · If you take public transportation, get off one stop early and walk the rest of the way.  · Make all of your phone calls while standing up and walking around.  · Get up, stretch, and walk around every 30 minutes throughout the day.  WHAT GUIDELINES SHOULD I FOLLOW WHILE EXERCISING?  · Do not exercise so much that you hurt yourself, feel dizzy, or get very short of breath.  · Consult your health care provider prior to starting a new exercise program.  · Wear comfortable clothes and shoes with good support.  · Drink plenty of water while you exercise to prevent dehydration or heat stroke. Body water is lost during exercise and must be replaced.  · Work out until you breathe faster and your heart beats faster.     This information is not intended to replace advice given to you by your health care provider. Make sure you discuss any questions you have with your health care provider.     Document Released: 01/20/2012 Document Revised: 01/08/2016 Document Reviewed: 05/21/2015  [employer] Patient Education ©2017 [employer]      Calorie Counting for Weight Loss  Calories are energy you get from the things you eat and drink. Your body uses this energy to keep you going throughout the day. The number of calories you eat affects your weight. When you eat more calories than your body needs, your body stores the extra calories as fat. When you eat fewer calories than your body needs, your body burns fat to get the energy it needs.  Calorie counting means keeping track of how many calories you eat and drink each day. If you make sure to eat fewer calories than your body needs, you should lose weight. In order for calorie counting to work, you will need to eat the number of calories that are right for you in a day to lose a healthy amount of weight per week.  A healthy amount of weight to lose per week is usually 1-2 lb (0.5-0.9 kg). A dietitian can determine how many calories you need in a day and give you suggestions on how to reach your calorie goal.   WHAT IS MY MY PLAN?  My goal is to have __________ calories per day.   If I have this many calories per day, I should lose around __________ pounds per week.  WHAT DO I NEED TO KNOW ABOUT CALORIE COUNTING?  In order to meet your daily calorie goal, you will need to:  · Find out how many calories are in each food you would like to eat. Try to do this before you eat.  · Decide how much of the food you can eat.  · Write down what you ate and how many calories it had. Doing this is called keeping a food log.  WHERE DO I FIND CALORIE INFORMATION?  The number of calories in a food can be found on a Nutrition Facts label. Note that all the information on a label is based on a specific serving of the food. If a food does not have a Nutrition Facts label, try to look up the calories online or ask your dietitian for help.  HOW DO I DECIDE HOW MUCH TO EAT?  To decide how much of the food you can eat, you will need to consider both the number of calories in one serving and the size of one serving. This information can be found on the Nutrition Facts label. If a food does not have a Nutrition Facts label, look up the information online or ask your dietitian for help.  Remember that calories are listed per serving. If you choose to have more than one serving of a food, you will have to multiply the calories per serving by the amount of servings you plan to eat. For example, the label on a package of bread might say that a serving size is 1 slice and that there are 90 calories in a serving. If you eat 1 slice, you will have eaten 90 calories. If you eat 2 slices, you will have eaten 180 calories.  HOW DO I KEEP A FOOD LOG?  After each meal, record the following information in your food log:  · What you ate.  · How much of it you  ate.  · How many calories it had.  · Then, add up your calories.  Keep your food log near you, such as in a small notebook in your pocket. Another option is to use a mobile bird or website. Some programs will calculate calories for you and show you how many calories you have left each time you add an item to the log.  WHAT ARE SOME CALORIE COUNTING TIPS?  · Use your calories on foods and drinks that will fill you up and not leave you hungry. Some examples of this include foods like nuts and nut butters, vegetables, lean proteins, and high-fiber foods (more than 5 g fiber per serving).  · Eat nutritious foods and avoid empty calories. Empty calories are calories you get from foods or beverages that do not have many nutrients, such as candy and soda. It is better to have a nutritious high-calorie food (such as an avocado) than a food with few nutrients (such as a bag of chips).  · Know how many calories are in the foods you eat most often. This way, you do not have to look up how many calories they have each time you eat them.  · Look out for foods that may seem like low-calorie foods but are really high-calorie foods, such as baked goods, soda, and fat-free candy.  · Pay attention to calories in drinks. Drinks such as sodas, specialty coffee drinks, alcohol, and juices have a lot of calories yet do not fill you up. Choose low-calorie drinks like water and diet drinks.  · Focus your calorie counting efforts on higher calorie items. Logging the calories in a garden salad that contains only vegetables is less important than calculating the calories in a milk shake.  · Find a way of tracking calories that works for you. Get creative. Most people who are successful find ways to keep track of how much they eat in a day, even if they do not count every calorie.  WHAT ARE SOME PORTION CONTROL TIPS?  · Know how many calories are in a serving. This will help you know how many servings of a certain food you can have.  · Use a  measuring cup to measure serving sizes. This is helpful when you start out. With time, you will be able to estimate serving sizes for some foods.  · Take some time to put servings of different foods on your favorite plates, bowls, and cups so you know what a serving looks like.  · Try not to eat straight from a bag or box. Doing this can lead to overeating. Put the amount you would like to eat in a cup or on a plate to make sure you are eating the right portion.  · Use smaller plates, glasses, and bowls to prevent overeating. This is a quick and easy way to practice portion control. If your plate is smaller, less food can fit on it.  · Try not to multitask while eating, such as watching TV or using your computer. If it is time to eat, sit down at a table and enjoy your food. Doing this will help you to start recognizing when you are full. It will also make you more aware of what and how much you are eating.  HOW CAN I CALORIE COUNT WHEN EATING OUT?  · Ask for smaller portion sizes or child-sized portions.  · Consider sharing an entree and sides instead of getting your own entree.  · If you get your own entree, eat only half. Ask for a box at the beginning of your meal and put the rest of your entree in it so you are not tempted to eat it.  · Look for the calories on the menu. If calories are listed, choose the lower calorie options.  · Choose dishes that include vegetables, fruits, whole grains, low-fat dairy products, and lean protein. Focusing on smart food choices from each of the 5 food groups can help you stay on track at restaurants.  · Choose items that are boiled, broiled, grilled, or steamed.  · Choose water, milk, unsweetened iced tea, or other drinks without added sugars. If you want an alcoholic beverage, choose a lower calorie option. For example, a regular kiesha can have up to 700 calories and a glass of wine has around 150.  · Stay away from items that are buttered, battered, fried, or served with  "cream sauce. Items labeled \"crispy\" are usually fried, unless stated otherwise.  · Ask for dressings, sauces, and syrups on the side. These are usually very high in calories, so do not eat much of them.  · Watch out for salads. Many people think salads are a healthy option, but this is often not the case. Many salads come with chisholm, fried chicken, lots of cheese, fried chips, and dressing. All of these items have a lot of calories. If you want a salad, choose a garden salad and ask for grilled meats or steak. Ask for the dressing on the side, or ask for olive oil and vinegar or lemon to use as dressing.  · Estimate how many servings of a food you are given. For example, a serving of cooked rice is ½ cup or about the size of half a tennis ball or one cupcake wrapper. Knowing serving sizes will help you be aware of how much food you are eating at restaurants. The list below tells you how big or small some common portion sizes are based on everyday objects.    1 oz--4 stacked dice.    3 oz--1 deck of cards.    1 tsp--1 dice.    1 Tbsp--½ a Ping-Pong ball.    2 Tbsp--1 Ping-Pong ball.    ½ cup--1 tennis ball or 1 cupcake wrapper.    1 cup--1 baseball.     This information is not intended to replace advice given to you by your health care provider. Make sure you discuss any questions you have with your health care provider.     Document Released: 12/18/2006 Document Revised: 01/08/2016 Document Reviewed: 10/23/2014  [employer] Interactive Patient Education ©2017 [employer]    "

## 2018-07-27 RX ORDER — OMEPRAZOLE 40 MG/1
CAPSULE, DELAYED RELEASE ORAL
Qty: 60 CAPSULE | Refills: 0 | Status: SHIPPED | OUTPATIENT
Start: 2018-07-27 | End: 2018-09-28 | Stop reason: SDUPTHER

## 2018-09-14 ENCOUNTER — LAB (OUTPATIENT)
Dept: LAB | Facility: OTHER | Age: 53
End: 2018-09-14

## 2018-09-14 DIAGNOSIS — E78.2 MIXED HYPERLIPIDEMIA: Chronic | ICD-10-CM

## 2018-09-14 DIAGNOSIS — I48.0 PAROXYSMAL ATRIAL FIBRILLATION (HCC): Chronic | ICD-10-CM

## 2018-09-14 DIAGNOSIS — Z12.5 SPECIAL SCREENING FOR MALIGNANT NEOPLASM OF PROSTATE: ICD-10-CM

## 2018-09-14 DIAGNOSIS — R73.01 IFG (IMPAIRED FASTING GLUCOSE): Chronic | ICD-10-CM

## 2018-09-14 DIAGNOSIS — I10 ESSENTIAL HYPERTENSION: Chronic | ICD-10-CM

## 2018-09-14 LAB
ALBUMIN SERPL-MCNC: 4.2 G/DL (ref 3.5–5)
ALBUMIN/GLOB SERPL: 1.1 G/DL (ref 1.1–1.8)
ALP SERPL-CCNC: 103 U/L (ref 38–126)
ALT SERPL W P-5'-P-CCNC: 66 U/L
ANION GAP SERPL CALCULATED.3IONS-SCNC: 10 MMOL/L (ref 5–15)
AST SERPL-CCNC: 42 U/L (ref 17–59)
BASOPHILS # BLD AUTO: 0.05 10*3/MM3 (ref 0–0.2)
BASOPHILS NFR BLD AUTO: 0.6 % (ref 0–2)
BILIRUB SERPL-MCNC: 0.6 MG/DL (ref 0.2–1.3)
BUN BLD-MCNC: 18 MG/DL (ref 9–20)
BUN/CREAT SERPL: 17.8 (ref 7–25)
CALCIUM SPEC-SCNC: 10.1 MG/DL (ref 8.4–10.2)
CHLORIDE SERPL-SCNC: 101 MMOL/L (ref 98–107)
CHOLEST SERPL-MCNC: 187 MG/DL (ref 150–200)
CO2 SERPL-SCNC: 27 MMOL/L (ref 22–30)
CREAT BLD-MCNC: 1.01 MG/DL (ref 0.66–1.25)
DEPRECATED RDW RBC AUTO: 43.5 FL (ref 35.1–43.9)
DIGOXIN SERPL-MCNC: 0.9 NG/ML (ref 0.8–2)
EOSINOPHIL # BLD AUTO: 0.2 10*3/MM3 (ref 0–0.7)
EOSINOPHIL NFR BLD AUTO: 2.5 % (ref 0–7)
ERYTHROCYTE [DISTWIDTH] IN BLOOD BY AUTOMATED COUNT: 13.4 % (ref 11.5–14.5)
GFR SERPL CREATININE-BSD FRML MDRD: 94 ML/MIN/1.73 (ref 56–130)
GLOBULIN UR ELPH-MCNC: 3.7 GM/DL (ref 2.3–3.5)
GLUCOSE BLD-MCNC: 225 MG/DL (ref 74–99)
HBA1C MFR BLD: 12.6 % (ref 4–5.6)
HCT VFR BLD AUTO: 41.6 % (ref 39–49)
HDLC SERPL-MCNC: 29 MG/DL (ref 40–59)
HGB BLD-MCNC: 13.9 G/DL (ref 13.7–17.3)
LDLC SERPL CALC-MCNC: 117 MG/DL
LDLC/HDLC SERPL: 4.03 {RATIO} (ref 0–3.55)
LYMPHOCYTES # BLD AUTO: 3.96 10*3/MM3 (ref 0.6–4.2)
LYMPHOCYTES NFR BLD AUTO: 49.7 % (ref 10–50)
MCH RBC QN AUTO: 30.4 PG (ref 26.5–34)
MCHC RBC AUTO-ENTMCNC: 33.4 G/DL (ref 31.5–36.3)
MCV RBC AUTO: 91 FL (ref 80–98)
MONOCYTES # BLD AUTO: 0.89 10*3/MM3 (ref 0–0.9)
MONOCYTES NFR BLD AUTO: 11.2 % (ref 0–12)
NEUTROPHILS # BLD AUTO: 2.86 10*3/MM3 (ref 2–8.6)
NEUTROPHILS NFR BLD AUTO: 36 % (ref 37–80)
PLATELET # BLD AUTO: 266 10*3/MM3 (ref 150–450)
PMV BLD AUTO: 8.7 FL (ref 8–12)
POTASSIUM BLD-SCNC: 4.7 MMOL/L (ref 3.4–5)
PROT SERPL-MCNC: 7.9 G/DL (ref 6.3–8.2)
PSA SERPL-MCNC: 0.07 NG/ML (ref 0–4)
RBC # BLD AUTO: 4.57 10*6/MM3 (ref 4.37–5.74)
SODIUM BLD-SCNC: 138 MMOL/L (ref 137–145)
TRIGL SERPL-MCNC: 205 MG/DL
VLDLC SERPL-MCNC: 41 MG/DL
WBC NRBC COR # BLD: 7.96 10*3/MM3 (ref 3.2–9.8)

## 2018-09-14 PROCEDURE — 83036 HEMOGLOBIN GLYCOSYLATED A1C: CPT | Performed by: INTERNAL MEDICINE

## 2018-09-14 PROCEDURE — 36415 COLL VENOUS BLD VENIPUNCTURE: CPT | Performed by: INTERNAL MEDICINE

## 2018-09-14 PROCEDURE — 80162 ASSAY OF DIGOXIN TOTAL: CPT | Performed by: INTERNAL MEDICINE

## 2018-09-14 PROCEDURE — G0103 PSA SCREENING: HCPCS | Performed by: INTERNAL MEDICINE

## 2018-09-14 PROCEDURE — 80061 LIPID PANEL: CPT | Performed by: INTERNAL MEDICINE

## 2018-09-14 PROCEDURE — 80053 COMPREHEN METABOLIC PANEL: CPT | Performed by: INTERNAL MEDICINE

## 2018-09-14 PROCEDURE — 85025 COMPLETE CBC W/AUTO DIFF WBC: CPT | Performed by: INTERNAL MEDICINE

## 2018-09-20 RX ORDER — ATORVASTATIN CALCIUM 10 MG/1
TABLET, FILM COATED ORAL
Qty: 30 TABLET | Refills: 11 | Status: SHIPPED | OUTPATIENT
Start: 2018-09-20 | End: 2019-09-24 | Stop reason: SDUPTHER

## 2018-09-21 ENCOUNTER — OFFICE VISIT (OUTPATIENT)
Dept: FAMILY MEDICINE CLINIC | Facility: CLINIC | Age: 53
End: 2018-09-21

## 2018-09-21 VITALS
TEMPERATURE: 98.3 F | WEIGHT: 261 LBS | HEIGHT: 78 IN | HEART RATE: 72 BPM | DIASTOLIC BLOOD PRESSURE: 80 MMHG | BODY MASS INDEX: 30.2 KG/M2 | SYSTOLIC BLOOD PRESSURE: 138 MMHG

## 2018-09-21 DIAGNOSIS — F17.200 TOBACCO DEPENDENCE SYNDROME: Chronic | ICD-10-CM

## 2018-09-21 DIAGNOSIS — E78.2 MIXED HYPERLIPIDEMIA: Chronic | ICD-10-CM

## 2018-09-21 DIAGNOSIS — E11.9 TYPE 2 DIABETES MELLITUS WITHOUT COMPLICATION, WITHOUT LONG-TERM CURRENT USE OF INSULIN (HCC): Primary | Chronic | ICD-10-CM

## 2018-09-21 DIAGNOSIS — E66.09 CLASS 1 OBESITY DUE TO EXCESS CALORIES WITH SERIOUS COMORBIDITY AND BODY MASS INDEX (BMI) OF 30.0 TO 30.9 IN ADULT: Chronic | ICD-10-CM

## 2018-09-21 DIAGNOSIS — G47.33 OBSTRUCTIVE SLEEP APNEA SYNDROME: Chronic | ICD-10-CM

## 2018-09-21 DIAGNOSIS — Z12.5 SPECIAL SCREENING FOR MALIGNANT NEOPLASM OF PROSTATE: Chronic | ICD-10-CM

## 2018-09-21 DIAGNOSIS — I10 ESSENTIAL HYPERTENSION: Chronic | ICD-10-CM

## 2018-09-21 DIAGNOSIS — I48.0 PAROXYSMAL ATRIAL FIBRILLATION (HCC): Chronic | ICD-10-CM

## 2018-09-21 DIAGNOSIS — R73.9 HYPERGLYCEMIA: ICD-10-CM

## 2018-09-21 PROCEDURE — 99214 OFFICE O/P EST MOD 30 MIN: CPT | Performed by: INTERNAL MEDICINE

## 2018-09-21 RX ORDER — GLIMEPIRIDE 4 MG/1
4 TABLET ORAL EVERY MORNING
Qty: 30 TABLET | Refills: 3 | Status: SHIPPED | OUTPATIENT
Start: 2018-09-21 | End: 2018-11-05 | Stop reason: SDUPTHER

## 2018-09-21 RX ORDER — DILTIAZEM HYDROCHLORIDE 240 MG/1
240 CAPSULE, COATED, EXTENDED RELEASE ORAL DAILY
COMMUNITY
End: 2021-05-13

## 2018-09-21 RX ORDER — IRBESARTAN 300 MG/1
150 TABLET ORAL NIGHTLY
COMMUNITY

## 2018-09-21 RX ORDER — BLOOD-GLUCOSE METER
KIT MISCELLANEOUS
Qty: 1 EACH | Refills: 0 | Status: SHIPPED | OUTPATIENT
Start: 2018-09-21

## 2018-09-21 NOTE — PATIENT INSTRUCTIONS
"Carbohydrate Counting for Diabetes Mellitus, Adult  Carbohydrate counting is a method for keeping track of how many carbohydrates you eat. Eating carbohydrates naturally increases the amount of sugar (glucose) in the blood. Counting how many carbohydrates you eat helps keep your blood glucose within normal limits, which helps you manage your diabetes (diabetes mellitus).  It is important to know how many carbohydrates you can safely have in each meal. This is different for every person. A diet and nutrition specialist (registered dietitian) can help you make a meal plan and calculate how many carbohydrates you should have at each meal and snack.  Carbohydrates are found in the following foods:  · Grains, such as breads and cereals.  · Dried beans and soy products.  · Starchy vegetables, such as potatoes, peas, and corn.  · Fruit and fruit juices.  · Milk and yogurt.  · Sweets and snack foods, such as cake, cookies, candy, chips, and soft drinks.    How do I count carbohydrates?  There are two ways to count carbohydrates in food. You can use either of the methods or a combination of both.  Reading \"Nutrition Facts\" on packaged food  The \"Nutrition Facts\" list is included on the labels of almost all packaged foods and beverages in the [place] It includes:  · The serving size.  · Information about nutrients in each serving, including the grams (g) of carbohydrate per serving.    To use the “Nutrition Facts\":  · Decide how many servings you will have.  · Multiply the number of servings by the number of carbohydrates per serving.  · The resulting number is the total amount of carbohydrates that you will be having.    Learning standard serving sizes of other foods  When you eat foods containing carbohydrates that are not packaged or do not include \"Nutrition Facts\" on the label, you need to measure the servings in order to count the amount of carbohydrates:  · Measure the foods that you will eat with a food scale or " measuring cup, if needed.  · Decide how many standard-size servings you will eat.  · Multiply the number of servings by 15. Most carbohydrate-rich foods have about 15 g of carbohydrates per serving.  ? For example, if you eat 8 oz (170 g) of strawberries, you will have eaten 2 servings and 30 g of carbohydrates (2 servings x 15 g = 30 g).  · For foods that have more than one food mixed, such as soups and casseroles, you must count the carbohydrates in each food that is included.    The following list contains standard serving sizes of common carbohydrate-rich foods. Each of these servings has about 15 g of carbohydrates:  · ½ hamburger bun or ½ English muffin.  · ½ oz (15 mL) syrup.  · ½ oz (14 g) jelly.  · 1 slice of bread.  · 1 six-inch tortilla.  · 3 oz (85 g) cooked rice or pasta.  · 4 oz (113 g) cooked dried beans.  · 4 oz (113 g) starchy vegetable, such as peas, corn, or potatoes.  · 4 oz (113 g) hot cereal.  · 4 oz (113 g) mashed potatoes or ¼ of a large baked potato.  · 4 oz (113 g) canned or frozen fruit.  · 4 oz (120 mL) fruit juice.  · 4-6 crackers.  · 6 chicken nuggets.  · 6 oz (170 g) unsweetened dry cereal.  · 6 oz (170 g) plain fat-free yogurt or yogurt sweetened with artificial sweeteners.  · 8 oz (240 mL) milk.  · 8 oz (170 g) fresh fruit or one small piece of fruit.  · 24 oz (680 g) popped popcorn.    Example of carbohydrate counting  Sample meal  · 3 oz (85 g) chicken breast.  · 6 oz (170 g) brown rice.  · 4 oz (113 g) corn.  · 8 oz (240 mL) milk.  · 8 oz (170 g) strawberries with sugar-free whipped topping.  Carbohydrate calculation  1. Identify the foods that contain carbohydrates:  ? Rice.  ? Corn.  ? Milk.  ? Strawberries.  2. Calculate how many servings you have of each food:  ? 2 servings rice.  ? 1 serving corn.  ? 1 serving milk.  ? 1 serving strawberries.  3. Multiply each number of servings by 15 g:  ? 2 servings rice x 15 g = 30 g.  ? 1 serving corn x 15 g = 15 g.  ? 1 serving milk x 15  g = 15 g.  ? 1 serving strawberries x 15 g = 15 g.  4. Add together all of the amounts to find the total grams of carbohydrates eaten:  ? 30 g + 15 g + 15 g + 15 g = 75 g of carbohydrates total.  This information is not intended to replace advice given to you by your health care provider. Make sure you discuss any questions you have with your health care provider.  Document Released: 12/18/2006 Document Revised: 07/07/2017 Document Reviewed: 05/31/2017  [employer] Interactive Patient Education © 2018 [employer]      Diabetes Mellitus and Exercise  Exercising regularly is important for your overall health, especially when you have diabetes (diabetes mellitus). Exercising is not only about losing weight. It has many health benefits, such as increasing muscle strength and bone density and reducing body fat and stress. This leads to improved fitness, flexibility, and endurance, all of which result in better overall health.  Exercise has additional benefits for people with diabetes, including:  · Reducing appetite.  · Helping to lower and control blood glucose.  · Lowering blood pressure.  · Helping to control amounts of fatty substances (lipids) in the blood, such as cholesterol and triglycerides.  · Helping the body to respond better to insulin (improving insulin sensitivity).  · Reducing how much insulin the body needs.  · Decreasing the risk for heart disease by:  ? Lowering cholesterol and triglyceride levels.  ? Increasing the levels of good cholesterol.  ? Lowering blood glucose levels.    What is my activity plan?  Your health care provider or certified diabetes educator can help you make a plan for the type and frequency of exercise (activity plan) that works for you. Make sure that you:  · Do at least 150 minutes of moderate-intensity or vigorous-intensity exercise each week. This could be brisk walking, biking, or water aerobics.  ? Do stretching and strength exercises, such as yoga or weightlifting, at least  2 times a week.  ? Spread out your activity over at least 3 days of the week.  · Get some form of physical activity every day.  ? Do not go more than 2 days in a row without some kind of physical activity.  ? Avoid being inactive for more than 90 minutes at a time. Take frequent breaks to walk or stretch.  · Choose a type of exercise or activity that you enjoy, and set realistic goals.  · Start slowly, and gradually increase the intensity of your exercise over time.    What do I need to know about managing my diabetes?  · Check your blood glucose before and after exercising.  ? If your blood glucose is higher than 240 mg/dL (13.3 mmol/L) before you exercise, check your urine for ketones. If you have ketones in your urine, do not exercise until your blood glucose returns to normal.  · Know the symptoms of low blood glucose (hypoglycemia) and how to treat it. Your risk for hypoglycemia increases during and after exercise. Common symptoms of hypoglycemia can include:  ? Hunger.  ? Anxiety.  ? Sweating and feeling clammy.  ? Confusion.  ? Dizziness or feeling light-headed.  ? Increased heart rate or palpitations.  ? Blurry vision.  ? Tingling or numbness around the mouth, lips, or tongue.  ? Tremors or shakes.  ? Irritability.  · Keep a rapid-acting carbohydrate snack available before, during, and after exercise to help prevent or treat hypoglycemia.  · Avoid injecting insulin into areas of the body that are going to be exercised. For example, avoid injecting insulin into:  ? The arms, when playing tennis.  ? The legs, when jogging.  · Keep records of your exercise habits. Doing this can help you and your health care provider adjust your diabetes management plan as needed. Write down:  ? Food that you eat before and after you exercise.  ? Blood glucose levels before and after you exercise.  ? The type and amount of exercise you have done.  ? When your insulin is expected to peak, if you use insulin. Avoid exercising at  times when your insulin is peaking.  · When you start a new exercise or activity, work with your health care provider to make sure the activity is safe for you, and to adjust your insulin, medicines, or food intake as needed.  · Drink plenty of water while you exercise to prevent dehydration or heat stroke. Drink enough fluid to keep your urine clear or pale yellow.  This information is not intended to replace advice given to you by your health care provider. Make sure you discuss any questions you have with your health care provider.  Document Released: 03/09/2005 Document Revised: 07/07/2017 Document Reviewed: 05/29/2017  [employer] Interactive Patient Education © 2018 [employer]

## 2018-09-21 NOTE — PROGRESS NOTES
Subjective        History of Present Illness     Praveen Noonan is a 53 y.o. male who presents for annual follow up on hyperlipidemia, hypertension, tobacco dependence syndrome, and GERD. His cardiologist is Dr. Hillman who follows his paroxysmal a-fib, for which he continues on Xarelto and also follows with electrophysiologist.  He continues to struggle with  chronic osteoarthritc knee pain.      Labs reveal new onset type 2 diabetes mellitus with A1c 12.6 and fasting glucose 225 increased from 117 last year.  He has a family history of diabetes in his mother.   He reports onset of symptoms including polydipsia and polyuria this summer consistent with diabetes.  Weight is down 8 pounds in the past year.  We had a discussion regarding diabetic diet principles and exercise.  He drinks one sugar sweetened soft drink daily, which I asked him to eliminate.  His liver enzymes are also elevated as below.  With his next visit, I plan on scheduling CT of the abdomen.     He is smoking about 1/2 pack of cigarettes daily.  I advised the patient of the risks in continuing to use tobacco products.  Patient is urged to stop smoking and never start again. Cessation aids are offered.  He is reluctant to try Chantix, but will let us know if he changes his mind.       When he was here last year, I asked him to restart Lipitor (atorvastatin) 10 mg daily after he had stopped it.  Total cholesterol improved with approximately 20% improvement in LDL with resuming the Lipitor.      Health maintenance reviewed.  He is due baseline colonoscopy.   We will address this in the next few weeks as getting his diabetes to goal is priority at this time.    He has a seborrheic keratosis on the lateral aspect of left leg.  He can schedule an appointment for cryotherapy removal if this becomes irritated.            Blood pressure is at goal.  His valsartan was replaced with Avapro due to  recall.       The patient's relevant past  "medical, surgical, and social history was reviewed in Epic.   Lab results are reviewed with the patient today.  Fasting glucose 225. A1c is 12.6. Renal function normal.  Liver enzymes elevated with ALT 66 increased from 41 and AST 42 increased from 30.  Total cholesterol 187. . HDL 29.  Triglycerides 205.  LDL/HDL ratio 4.03. PSA normal.         Review of Systems   Constitutional: Negative for chills, fatigue and fever.   HENT: Negative for congestion, ear pain, postnasal drip, sinus pressure and sore throat.    Respiratory: Negative for cough, shortness of breath and wheezing.    Cardiovascular: Negative for chest pain, palpitations and leg swelling.   Gastrointestinal: Negative for abdominal pain, blood in stool, constipation, diarrhea, nausea and vomiting.   Endocrine: Negative for cold intolerance, heat intolerance, polydipsia and polyuria.   Genitourinary: Negative for dysuria, frequency, hematuria and urgency.   Skin: Negative for rash.   Neurological: Negative for syncope and weakness.        Objective      Vitals:    09/21/18 1335   BP: 138/80   Pulse: 72   Temp: 98.3 °F (36.8 °C)   TempSrc: Oral   Weight: 118 kg (261 lb)   Height: 198.1 cm (78\")         Physical Exam   Constitutional: He is oriented to person, place, and time. He appears well-developed and well-nourished. No distress.   Obese male accompanied by his son, Carlton.     HENT:   Head: Normocephalic and atraumatic.   Nose: Right sinus exhibits no maxillary sinus tenderness and no frontal sinus tenderness. Left sinus exhibits no maxillary sinus tenderness and no frontal sinus tenderness.   Mouth/Throat: Uvula is midline, oropharynx is clear and moist and mucous membranes are normal. No oral lesions. No tonsillar exudate.   Benign posterior smoker's changes.     Eyes: Pupils are equal, round, and reactive to light. Conjunctivae and EOM are normal.   Neck: Trachea normal. Neck supple. No JVD present. Carotid bruit is not present. No tracheal " deviation present. No thyroid mass and no thyromegaly present.   Cardiovascular: Normal rate, regular rhythm, normal heart sounds and intact distal pulses.   No extrasystoles are present. PMI is not displaced.    No murmur heard.  Pulmonary/Chest: Effort normal and breath sounds normal. No accessory muscle usage. No respiratory distress. He has no decreased breath sounds. He has no wheezes. He has no rhonchi. He has no rales.   Chronic lung sounds.    Abdominal: Soft. Bowel sounds are normal. He exhibits no distension. There is no hepatosplenomegaly. There is no tenderness.     Vascular Status -  His right foot exhibits normal foot vasculature  and no edema. His left foot exhibits normal foot vasculature  and no edema.  Lymphadenopathy:     He has no cervical adenopathy.   Neurological: He is alert and oriented to person, place, and time. No cranial nerve deficit. Coordination normal.   Skin: Skin is warm, dry and intact. No rash noted. No cyanosis. Nails show no clubbing.   Psychiatric: He has a normal mood and affect. His speech is normal and behavior is normal. Judgment and thought content normal.   Vitals reviewed.        Assessment/Plan      We discussed new onset Type 2 diabetes mellitus and reviewed diabetic diet principles.  Prescriptions are sent for metformin 500 mg to take one tablet daily for the first week progressing to b.i.d. thereafter and Amaryl 4 mg to take one q.a.m.   Decrease carbohydrates and sugar in the diet.  Written literature regarding diabetic diet (carbohydrate reduction) and type 2 diabetes mellitus and exercise included in patient's AVS today.  Eliminate the sugar sweetened soft drinks.  A prescription is sent for a glucometer and test strips to monitor glucose twice daily alternating morning and evening and keep a glucose diary.  If he has any difficulty with using the glucometer, he can bring it by for instructions on using the glucometer.     He can schedule an appointment for  cryotherapy removal of the SK on the lateral aspect of left leg if this becomes irritated.     Continue follow-up appointments with Dr. Hillman, cardiology and electrophysiologist.   Continue 10 mg of Lipitor daily and dietary efforts.     I advised the patient of the risks in continuing to use tobacco products.  Patient is urged to stop smoking and never start again.  He is reluctant to try Chantix, but will let us know if he changes his mind.       We will discuss his repeat colonoscopy with follow up visits when he have his diabetes under better control.     Return in two weeks for recheck on new onset Type 2 diabetes mellitus with diabetic diary.   With his next visit, I plan on scheduling CT of the abdomen.     Scribed for Dr. Angel by Vandana Gomes [facility].     Diagnoses and all orders for this visit:    Type 2 diabetes mellitus without complication, without long-term current use of insulin (CMS/HCC)    Mixed hyperlipidemia    Hyperglycemia    Essential hypertension    Paroxysmal atrial fibrillation (CMS/HCC)    Obstructive sleep apnea syndrome - followed by Dr. Gil    Class 1 obesity due to excess calories with serious comorbidity and body mass index (BMI) of 30.0 to 30.9 in adult    Tobacco dependence syndrome    Special screening for malignant neoplasm of prostate    Other orders  -     irbesartan (AVAPRO) 300 MG tablet; Take 300 mg by mouth Every Night.  -     diltiaZEM CD (CARTIA XT) 240 MG 24 hr capsule; Take 240 mg by mouth Daily.  -     rivaroxaban (XARELTO) 20 MG tablet; Take 20 mg by mouth Daily.  -     metFORMIN (GLUCOPHAGE) 500 MG tablet; Take 1 tablet by mouth 2 (Two) Times a Day. For diabetes  -     glimepiride (AMARYL) 4 MG tablet; Take 1 tablet by mouth Every Morning. For diabetes        Lab on 09/14/2018   Component Date Value Ref Range Status   • WBC 09/14/2018 7.96  3.20 - 9.80 10*3/mm3 Final   • RBC 09/14/2018 4.57  4.37 - 5.74 10*6/mm3 Final   • Hemoglobin 09/14/2018 13.9  13.7 -  17.3 g/dL Final   • Hematocrit 09/14/2018 41.6  39.0 - 49.0 % Final   • MCV 09/14/2018 91.0  80.0 - 98.0 fL Final   • MCH 09/14/2018 30.4  26.5 - 34.0 pg Final   • MCHC 09/14/2018 33.4  31.5 - 36.3 g/dL Final   • RDW 09/14/2018 13.4  11.5 - 14.5 % Final   • RDW-SD 09/14/2018 43.5  35.1 - 43.9 fl Final   • MPV 09/14/2018 8.7  8.0 - 12.0 fL Final   • Platelets 09/14/2018 266  150 - 450 10*3/mm3 Final   • Neutrophil % 09/14/2018 36.0* 37.0 - 80.0 % Final   • Lymphocyte % 09/14/2018 49.7  10.0 - 50.0 % Final   • Monocyte % 09/14/2018 11.2  0.0 - 12.0 % Final   • Eosinophil % 09/14/2018 2.5  0.0 - 7.0 % Final   • Basophil % 09/14/2018 0.6  0.0 - 2.0 % Final   • Neutrophils, Absolute 09/14/2018 2.86  2.00 - 8.60 10*3/mm3 Final   • Lymphocytes, Absolute 09/14/2018 3.96  0.60 - 4.20 10*3/mm3 Final   • Monocytes, Absolute 09/14/2018 0.89  0.00 - 0.90 10*3/mm3 Final   • Eosinophils, Absolute 09/14/2018 0.20  0.00 - 0.70 10*3/mm3 Final   • Basophils, Absolute 09/14/2018 0.05  0.00 - 0.20 10*3/mm3 Final   • Glucose 09/14/2018 225* 74 - 99 mg/dL Final   • BUN 09/14/2018 18  9 - 20 mg/dL Final   • Creatinine 09/14/2018 1.01  0.66 - 1.25 mg/dL Final   • Sodium 09/14/2018 138  137 - 145 mmol/L Final   • Potassium 09/14/2018 4.7  3.4 - 5.0 mmol/L Final   • Chloride 09/14/2018 101  98 - 107 mmol/L Final   • CO2 09/14/2018 27.0  22.0 - 30.0 mmol/L Final   • Calcium 09/14/2018 10.1  8.4 - 10.2 mg/dL Final   • Total Protein 09/14/2018 7.9  6.3 - 8.2 g/dL Final   • Albumin 09/14/2018 4.20  3.50 - 5.00 g/dL Final   • ALT (SGPT) 09/14/2018 66* <=50 U/L Final   • AST (SGOT) 09/14/2018 42  17 - 59 U/L Final   • Alkaline Phosphatase 09/14/2018 103  38 - 126 U/L Final   • Total Bilirubin 09/14/2018 0.6  0.2 - 1.3 mg/dL Final   • eGFR   Amer 09/14/2018 94  56 - 130 mL/min/1.73 Final   • Globulin 09/14/2018 3.7* 2.3 - 3.5 gm/dL Final   • A/G Ratio 09/14/2018 1.1  1.1 - 1.8 g/dL Final   • BUN/Creatinine Ratio 09/14/2018 17.8  7.0 - 25.0  Final   • Anion Gap 09/14/2018 10.0  5.0 - 15.0 mmol/L Final   • Hemoglobin A1C 09/14/2018 12.6* 4 - 5.6 % Final   • Total Cholesterol 09/14/2018 187  150 - 200 mg/dL Final   • Triglycerides 09/14/2018 205* <=150 mg/dL Final   • HDL Cholesterol 09/14/2018 29* 40 - 59 mg/dL Final   • LDL Cholesterol  09/14/2018 117* <=100 mg/dL Final   • VLDL Cholesterol 09/14/2018 41  mg/dL Final   • LDL/HDL Ratio 09/14/2018 4.03* 0.00 - 3.55 Final   • PSA 09/14/2018 0.069  0.000 - 4.000 ng/mL Final   • Digoxin 09/14/2018 0.90  0.80 - 2.00 ng/mL Final   ]

## 2018-09-28 RX ORDER — OMEPRAZOLE 40 MG/1
CAPSULE, DELAYED RELEASE ORAL
Qty: 90 CAPSULE | Refills: 3 | Status: SHIPPED | OUTPATIENT
Start: 2018-09-28 | End: 2019-10-11 | Stop reason: SDUPTHER

## 2018-10-05 ENCOUNTER — OFFICE VISIT (OUTPATIENT)
Dept: FAMILY MEDICINE CLINIC | Facility: CLINIC | Age: 53
End: 2018-10-05

## 2018-10-05 VITALS
HEIGHT: 78 IN | HEART RATE: 76 BPM | WEIGHT: 260.8 LBS | SYSTOLIC BLOOD PRESSURE: 156 MMHG | BODY MASS INDEX: 30.18 KG/M2 | DIASTOLIC BLOOD PRESSURE: 88 MMHG

## 2018-10-05 DIAGNOSIS — E66.09 CLASS 1 OBESITY DUE TO EXCESS CALORIES WITH SERIOUS COMORBIDITY AND BODY MASS INDEX (BMI) OF 30.0 TO 30.9 IN ADULT: Chronic | ICD-10-CM

## 2018-10-05 DIAGNOSIS — E11.9 TYPE 2 DIABETES MELLITUS WITHOUT COMPLICATION, WITHOUT LONG-TERM CURRENT USE OF INSULIN (HCC): Primary | Chronic | ICD-10-CM

## 2018-10-05 DIAGNOSIS — I10 ESSENTIAL HYPERTENSION: Chronic | ICD-10-CM

## 2018-10-05 PROCEDURE — 99214 OFFICE O/P EST MOD 30 MIN: CPT | Performed by: INTERNAL MEDICINE

## 2018-10-05 NOTE — PROGRESS NOTES
"Subjective        History of Present Illness     Praveen Noonan is a 53 y.o. male with medical issues including hyperlipidemia, hypertension, tobacco dependence syndrome, GERD, and Type 2 diabetes mellitus just recently diagnosed, 9/2018.  Initial A1c was 12.6.  I started him on metformin 500 mg b.i.d.and Amaryl 4 mg q.a.m.  He comes in today with diabetic diary revealing vast improvement with morning average 125 with evening numbers range from 150-180.  He reports one isolated episode when he was symptomatic feeling lightheaded with glucose 79 prior to supper, resolved quickly with a snack.  He has been successful and compliant with diabetic diet and reports feeling much better with decrease in polydipsia and polyuria.  Sleep is much improved.  He reports only mild episodes of loose stool with the metformin.         Review of Systems   Constitutional: Negative for chills, fatigue and fever.   HENT: Negative for congestion, ear pain, postnasal drip, sinus pressure and sore throat.    Respiratory: Negative for cough, shortness of breath and wheezing.    Cardiovascular: Negative for chest pain, palpitations and leg swelling.   Gastrointestinal: Negative for abdominal pain, blood in stool, constipation, diarrhea, nausea and vomiting.   Endocrine: Negative for cold intolerance, heat intolerance, polydipsia and polyuria.   Genitourinary: Negative for dysuria, frequency, hematuria and urgency.   Skin: Negative for rash.   Neurological: Negative for syncope and weakness.        Objective     Vitals:    10/05/18 1318   BP: 156/88   Pulse: 76   Weight: 118 kg (260 lb 12.8 oz)   Height: 198.1 cm (78\")     Physical Exam   Constitutional: He is oriented to person, place, and time. He appears well-developed and well-nourished. No distress.   Obese male accompanied by his daughter and grandson.        HENT:   Head: Normocephalic and atraumatic.   Nose: Nose normal.   Mouth/Throat: Oropharynx is clear and moist. No " oropharyngeal exudate.   Chronic lung sounds.     Eyes: Pupils are equal, round, and reactive to light. EOM are normal.   Neck: Neck supple. No JVD present. No thyromegaly present.   Cardiovascular: Normal rate, regular rhythm and normal heart sounds.    Pulmonary/Chest: Effort normal and breath sounds normal. No accessory muscle usage. No respiratory distress. He has no wheezes. He has no rales.   Chronic lung sounds.     Abdominal: Soft. Bowel sounds are normal. He exhibits no distension. There is no tenderness.   Obese abdomen.    Musculoskeletal: He exhibits no edema.   Lymphadenopathy:     He has no cervical adenopathy.   Neurological: He is alert and oriented to person, place, and time. No cranial nerve deficit.   Psychiatric: He has a normal mood and affect. His speech is normal and behavior is normal. Judgment and thought content normal.       Assessment/Plan      Increase metformin to 850 mg b.i.d.  He can take metformin 500 mg t.i.d. until his current supply of metformin is depleted.  Decrease Amaryl 4 mg to take 1/2 tablet q.a.m. (2 mg).  Add Jardiance 25 mg one tablet q.a.m.   Drink plenty of liquids, water is preferable.  Decrease carbohydrates and sugar in the diet.   Continue to monitor glucose and we will see him back in one month with diabetic diary.  Increase physical activity/exercise and intensify weight loss efforts.  Continue to monitor glucose twice daily for now.    Monitor blood pressure at home and notify me if not consistently at goal.  Pursue sodium restriction and weight loss.      Scribed for Dr. Angel by Vandana Gomes, [facility].     Diagnoses and all orders for this visit:    Type 2 diabetes mellitus without complication, without long-term current use of insulin (CMS/[facility]) - diagnosed 9/2018    Essential hypertension    Class 1 obesity due to excess calories with serious comorbidity and body mass index (BMI) of 30.0 to 30.9 in adult    Other orders  -     Empagliflozin 25 MG tablet;  Take 25 mg by mouth Every Morning. For diabetes        No visits with results within 3 Week(s) from this visit.   Latest known visit with results is:   Lab on 09/14/2018   Component Date Value Ref Range Status   • WBC 09/14/2018 7.96  3.20 - 9.80 10*3/mm3 Final   • RBC 09/14/2018 4.57  4.37 - 5.74 10*6/mm3 Final   • Hemoglobin 09/14/2018 13.9  13.7 - 17.3 g/dL Final   • Hematocrit 09/14/2018 41.6  39.0 - 49.0 % Final   • MCV 09/14/2018 91.0  80.0 - 98.0 fL Final   • MCH 09/14/2018 30.4  26.5 - 34.0 pg Final   • MCHC 09/14/2018 33.4  31.5 - 36.3 g/dL Final   • RDW 09/14/2018 13.4  11.5 - 14.5 % Final   • RDW-SD 09/14/2018 43.5  35.1 - 43.9 fl Final   • MPV 09/14/2018 8.7  8.0 - 12.0 fL Final   • Platelets 09/14/2018 266  150 - 450 10*3/mm3 Final   • Neutrophil % 09/14/2018 36.0* 37.0 - 80.0 % Final   • Lymphocyte % 09/14/2018 49.7  10.0 - 50.0 % Final   • Monocyte % 09/14/2018 11.2  0.0 - 12.0 % Final   • Eosinophil % 09/14/2018 2.5  0.0 - 7.0 % Final   • Basophil % 09/14/2018 0.6  0.0 - 2.0 % Final   • Neutrophils, Absolute 09/14/2018 2.86  2.00 - 8.60 10*3/mm3 Final   • Lymphocytes, Absolute 09/14/2018 3.96  0.60 - 4.20 10*3/mm3 Final   • Monocytes, Absolute 09/14/2018 0.89  0.00 - 0.90 10*3/mm3 Final   • Eosinophils, Absolute 09/14/2018 0.20  0.00 - 0.70 10*3/mm3 Final   • Basophils, Absolute 09/14/2018 0.05  0.00 - 0.20 10*3/mm3 Final   • Glucose 09/14/2018 225* 74 - 99 mg/dL Final   • BUN 09/14/2018 18  9 - 20 mg/dL Final   • Creatinine 09/14/2018 1.01  0.66 - 1.25 mg/dL Final   • Sodium 09/14/2018 138  137 - 145 mmol/L Final   • Potassium 09/14/2018 4.7  3.4 - 5.0 mmol/L Final   • Chloride 09/14/2018 101  98 - 107 mmol/L Final   • CO2 09/14/2018 27.0  22.0 - 30.0 mmol/L Final   • Calcium 09/14/2018 10.1  8.4 - 10.2 mg/dL Final   • Total Protein 09/14/2018 7.9  6.3 - 8.2 g/dL Final   • Albumin 09/14/2018 4.20  3.50 - 5.00 g/dL Final   • ALT (SGPT) 09/14/2018 66* <=50 U/L Final   • AST (SGOT) 09/14/2018 42  17 -  59 U/L Final   • Alkaline Phosphatase 09/14/2018 103  38 - 126 U/L Final   • Total Bilirubin 09/14/2018 0.6  0.2 - 1.3 mg/dL Final   • eGFR   Amer 09/14/2018 94  56 - 130 mL/min/1.73 Final   • Globulin 09/14/2018 3.7* 2.3 - 3.5 gm/dL Final   • A/G Ratio 09/14/2018 1.1  1.1 - 1.8 g/dL Final   • BUN/Creatinine Ratio 09/14/2018 17.8  7.0 - 25.0 Final   • Anion Gap 09/14/2018 10.0  5.0 - 15.0 mmol/L Final   • Hemoglobin A1C 09/14/2018 12.6* 4 - 5.6 % Final   • Total Cholesterol 09/14/2018 187  150 - 200 mg/dL Final   • Triglycerides 09/14/2018 205* <=150 mg/dL Final   • HDL Cholesterol 09/14/2018 29* 40 - 59 mg/dL Final   • LDL Cholesterol  09/14/2018 117* <=100 mg/dL Final   • VLDL Cholesterol 09/14/2018 41  mg/dL Final   • LDL/HDL Ratio 09/14/2018 4.03* 0.00 - 3.55 Final   • PSA 09/14/2018 0.069  0.000 - 4.000 ng/mL Final   • Digoxin 09/14/2018 0.90  0.80 - 2.00 ng/mL Final   ]

## 2018-11-05 ENCOUNTER — OFFICE VISIT (OUTPATIENT)
Dept: FAMILY MEDICINE CLINIC | Facility: CLINIC | Age: 53
End: 2018-11-05

## 2018-11-05 VITALS
HEART RATE: 76 BPM | HEIGHT: 78 IN | WEIGHT: 265 LBS | SYSTOLIC BLOOD PRESSURE: 128 MMHG | DIASTOLIC BLOOD PRESSURE: 70 MMHG | BODY MASS INDEX: 30.66 KG/M2 | TEMPERATURE: 98.1 F

## 2018-11-05 DIAGNOSIS — E16.2 HYPOGLYCEMIA: ICD-10-CM

## 2018-11-05 DIAGNOSIS — I10 ESSENTIAL HYPERTENSION: Chronic | ICD-10-CM

## 2018-11-05 DIAGNOSIS — E11.9 TYPE 2 DIABETES MELLITUS WITHOUT COMPLICATION, WITHOUT LONG-TERM CURRENT USE OF INSULIN (HCC): Chronic | ICD-10-CM

## 2018-11-05 DIAGNOSIS — I48.0 PAROXYSMAL ATRIAL FIBRILLATION (HCC): Chronic | ICD-10-CM

## 2018-11-05 DIAGNOSIS — E11.65 TYPE 2 DIABETES MELLITUS WITH HYPERGLYCEMIA, WITHOUT LONG-TERM CURRENT USE OF INSULIN (HCC): Primary | ICD-10-CM

## 2018-11-05 PROCEDURE — 90674 CCIIV4 VAC NO PRSV 0.5 ML IM: CPT | Performed by: INTERNAL MEDICINE

## 2018-11-05 PROCEDURE — 99214 OFFICE O/P EST MOD 30 MIN: CPT | Performed by: INTERNAL MEDICINE

## 2018-11-05 PROCEDURE — 90471 IMMUNIZATION ADMIN: CPT | Performed by: INTERNAL MEDICINE

## 2018-11-05 RX ORDER — GLIMEPIRIDE 1 MG/1
1 TABLET ORAL EVERY MORNING
Qty: 30 TABLET | Refills: 11 | Status: SHIPPED | OUTPATIENT
Start: 2018-11-05 | End: 2019-10-29 | Stop reason: SDUPTHER

## 2018-11-05 NOTE — PROGRESS NOTES
"Subjective        History of Present Illness     Praveen Noonan is a 53 y.o. male with medical issues including hyperlipidemia, hypertension, tobacco dependence syndrome, GERD, and Type 2 diabetes mellitus just recently diagnosed, 9/2018 with initial A1c12.6.  We have been making gradual adjustments in therapy to lower glucose.  Last month, I had him increase metformin to 850 mg b.i.d.  However, the prescription was inadvertently sent for 500 mg. We will have him take three of the 500 mg metformin until current supply is depleted.  He was to decrease Amaryl to 2 mg q.a.m. and add Jardiance 25 mg one tablet q.a.m.   He brings in a diabetic diary for the past two weeks revealing glucose averaging around 100 with a range  with a few mid-day mild hypoglycemic episodes.  He is having some mild diarrhea with the regimen, but otherwise tolerating medications.  We will decrease his Amaryl to 1 mg daily, but he may need to add an extra 1 mg of Amaryl occasionally with high carbohydrate meal.  Weight is up 5 pounds in the past month.        Review of Systems   Constitutional: Negative for chills, fatigue and fever.   HENT: Negative for congestion, ear pain, postnasal drip, sinus pressure and sore throat.    Respiratory: Negative for cough, shortness of breath and wheezing.    Cardiovascular: Negative for chest pain, palpitations and leg swelling.   Gastrointestinal: Negative for abdominal pain, blood in stool, constipation, diarrhea, nausea and vomiting.   Endocrine: Negative for cold intolerance, heat intolerance, polydipsia and polyuria.   Genitourinary: Negative for dysuria, frequency, hematuria and urgency.   Skin: Negative for rash.   Neurological: Negative for syncope and weakness.        Objective     Vitals:    11/05/18 1501   BP: 128/70   Pulse: 76   Temp: 98.1 °F (36.7 °C)   TempSrc: Oral   Weight: 120 kg (265 lb)   Height: 198.1 cm (78\")     Physical Exam   Constitutional: He is oriented to person, place, " and time. He appears well-developed and well-nourished. No distress.   Obese male.   HENT:   Head: Normocephalic and atraumatic.   Nose: Nose normal.   Mouth/Throat: Oropharynx is clear and moist. No oropharyngeal exudate.   Benign posterior smoker's changes.       Eyes: Pupils are equal, round, and reactive to light. EOM are normal.   Neck: Neck supple. No JVD present. No thyromegaly present.   Cardiovascular: Normal rate, regular rhythm and normal heart sounds.    Pulmonary/Chest: Effort normal and breath sounds normal. No accessory muscle usage. No respiratory distress. He has no wheezes. He has no rales.   Chronic lung sounds.    Abdominal: Soft. Bowel sounds are normal. He exhibits no distension. There is no tenderness.   Musculoskeletal: He exhibits no edema.   Lymphadenopathy:     He has no cervical adenopathy.   Neurological: He is alert and oriented to person, place, and time. No cranial nerve deficit.   Psychiatric: He has a normal mood and affect. His speech is normal and behavior is normal. Judgment and thought content normal.         Assessment/Plan    The hypoglycemia is a new problem.    Decrease his Amaryl to 1 mg daily.  He can add an extra 1 mg of Amaryl with high carbohydrate meal.  Increase metformin to three 500 mg daily until current supply is depleted and then start metformin 850 mg b.i.d.  Continue Jardiance 25 mg one q.d.  Continue to decrease carbohydrates and sugar in the diet. Continue to monitor glucose.  Increase physical activity/exercise and intensify weight loss efforts.      Continue current blood pressure meds. Pursue sodium restriction.    Return in five months for routine follow up with fasting labs one week prior.      Scribed for Dr. Angel by Vandana Gomes [facility].     Diagnoses and all orders for this visit:    Type 2 diabetes mellitus with hyperglycemia, without long-term current use of insulin (CMS/[facility])  -     TSH; Future    Hypoglycemia    Type 2 diabetes mellitus  without complication, without long-term current use of insulin (CMS/HCC) - diagnosed 9/2018  -     CBC Auto Differential; Future  -     Comprehensive Metabolic Panel; Future  -     Hemoglobin A1c; Future  -     Microalbumin / Creatinine Urine Ratio - Urine, Clean Catch; Future    Essential hypertension  -     Lipid Panel; Future    Paroxysmal atrial fibrillation (CMS/HCC)    Other orders  -     apixaban (ELIQUIS) 5 MG tablet tablet; Take 5 mg by mouth 2 (Two) Times a Day.  -     Flucelvax Quad=>4Years (PFS)  -     Discontinue: metFORMIN (GLUCOPHAGE) 850 MG tablet; Take 1 tablet by mouth 2 (Two) Times a Day. For diabetes  -     glimepiride (AMARYL) 1 MG tablet; Take 1 tablet by mouth Every Morning. For diabetes  -     metFORMIN (GLUCOPHAGE) 850 MG tablet; Take 1 tablet by mouth 2 (Two) Times a Day. For diabetes        No visits with results within 3 Week(s) from this visit.   Latest known visit with results is:   Lab on 09/14/2018   Component Date Value Ref Range Status   • WBC 09/14/2018 7.96  3.20 - 9.80 10*3/mm3 Final   • RBC 09/14/2018 4.57  4.37 - 5.74 10*6/mm3 Final   • Hemoglobin 09/14/2018 13.9  13.7 - 17.3 g/dL Final   • Hematocrit 09/14/2018 41.6  39.0 - 49.0 % Final   • MCV 09/14/2018 91.0  80.0 - 98.0 fL Final   • MCH 09/14/2018 30.4  26.5 - 34.0 pg Final   • MCHC 09/14/2018 33.4  31.5 - 36.3 g/dL Final   • RDW 09/14/2018 13.4  11.5 - 14.5 % Final   • RDW-SD 09/14/2018 43.5  35.1 - 43.9 fl Final   • MPV 09/14/2018 8.7  8.0 - 12.0 fL Final   • Platelets 09/14/2018 266  150 - 450 10*3/mm3 Final   • Neutrophil % 09/14/2018 36.0* 37.0 - 80.0 % Final   • Lymphocyte % 09/14/2018 49.7  10.0 - 50.0 % Final   • Monocyte % 09/14/2018 11.2  0.0 - 12.0 % Final   • Eosinophil % 09/14/2018 2.5  0.0 - 7.0 % Final   • Basophil % 09/14/2018 0.6  0.0 - 2.0 % Final   • Neutrophils, Absolute 09/14/2018 2.86  2.00 - 8.60 10*3/mm3 Final   • Lymphocytes, Absolute 09/14/2018 3.96  0.60 - 4.20 10*3/mm3 Final   • Monocytes,  Absolute 09/14/2018 0.89  0.00 - 0.90 10*3/mm3 Final   • Eosinophils, Absolute 09/14/2018 0.20  0.00 - 0.70 10*3/mm3 Final   • Basophils, Absolute 09/14/2018 0.05  0.00 - 0.20 10*3/mm3 Final   • Glucose 09/14/2018 225* 74 - 99 mg/dL Final   • BUN 09/14/2018 18  9 - 20 mg/dL Final   • Creatinine 09/14/2018 1.01  0.66 - 1.25 mg/dL Final   • Sodium 09/14/2018 138  137 - 145 mmol/L Final   • Potassium 09/14/2018 4.7  3.4 - 5.0 mmol/L Final   • Chloride 09/14/2018 101  98 - 107 mmol/L Final   • CO2 09/14/2018 27.0  22.0 - 30.0 mmol/L Final   • Calcium 09/14/2018 10.1  8.4 - 10.2 mg/dL Final   • Total Protein 09/14/2018 7.9  6.3 - 8.2 g/dL Final   • Albumin 09/14/2018 4.20  3.50 - 5.00 g/dL Final   • ALT (SGPT) 09/14/2018 66* <=50 U/L Final   • AST (SGOT) 09/14/2018 42  17 - 59 U/L Final   • Alkaline Phosphatase 09/14/2018 103  38 - 126 U/L Final   • Total Bilirubin 09/14/2018 0.6  0.2 - 1.3 mg/dL Final   • eGFR   Amer 09/14/2018 94  56 - 130 mL/min/1.73 Final   • Globulin 09/14/2018 3.7* 2.3 - 3.5 gm/dL Final   • A/G Ratio 09/14/2018 1.1  1.1 - 1.8 g/dL Final   • BUN/Creatinine Ratio 09/14/2018 17.8  7.0 - 25.0 Final   • Anion Gap 09/14/2018 10.0  5.0 - 15.0 mmol/L Final   • Hemoglobin A1C 09/14/2018 12.6* 4 - 5.6 % Final   • Total Cholesterol 09/14/2018 187  150 - 200 mg/dL Final   • Triglycerides 09/14/2018 205* <=150 mg/dL Final   • HDL Cholesterol 09/14/2018 29* 40 - 59 mg/dL Final   • LDL Cholesterol  09/14/2018 117* <=100 mg/dL Final   • VLDL Cholesterol 09/14/2018 41  mg/dL Final   • LDL/HDL Ratio 09/14/2018 4.03* 0.00 - 3.55 Final   • PSA 09/14/2018 0.069  0.000 - 4.000 ng/mL Final   • Digoxin 09/14/2018 0.90  0.80 - 2.00 ng/mL Final   ]

## 2019-04-01 ENCOUNTER — LAB (OUTPATIENT)
Dept: LAB | Facility: OTHER | Age: 54
End: 2019-04-01

## 2019-04-01 DIAGNOSIS — I10 ESSENTIAL HYPERTENSION: Chronic | ICD-10-CM

## 2019-04-01 DIAGNOSIS — E11.65 TYPE 2 DIABETES MELLITUS WITH HYPERGLYCEMIA, WITHOUT LONG-TERM CURRENT USE OF INSULIN (HCC): ICD-10-CM

## 2019-04-01 DIAGNOSIS — E11.9 TYPE 2 DIABETES MELLITUS WITHOUT COMPLICATION, WITHOUT LONG-TERM CURRENT USE OF INSULIN (HCC): Chronic | ICD-10-CM

## 2019-04-01 LAB
ALBUMIN SERPL-MCNC: 4.1 G/DL (ref 3.5–5)
ALBUMIN UR-MCNC: <1.2 MG/L
ALBUMIN/GLOB SERPL: 1.3 G/DL (ref 1.1–1.8)
ALP SERPL-CCNC: 82 U/L (ref 38–126)
ALT SERPL W P-5'-P-CCNC: 36 U/L
ANION GAP SERPL CALCULATED.3IONS-SCNC: 7 MMOL/L (ref 5–15)
AST SERPL-CCNC: 28 U/L (ref 17–59)
BASOPHILS # BLD AUTO: 0.04 10*3/MM3 (ref 0–0.2)
BASOPHILS NFR BLD AUTO: 0.6 % (ref 0–1.5)
BILIRUB SERPL-MCNC: 0.4 MG/DL (ref 0.2–1.3)
BUN BLD-MCNC: 15 MG/DL (ref 7–23)
BUN/CREAT SERPL: 13.8 (ref 7–25)
CALCIUM SPEC-SCNC: 9.8 MG/DL (ref 8.4–10.2)
CHLORIDE SERPL-SCNC: 105 MMOL/L (ref 101–112)
CHOLEST SERPL-MCNC: 145 MG/DL (ref 150–200)
CO2 SERPL-SCNC: 28 MMOL/L (ref 22–30)
CREAT BLD-MCNC: 1.09 MG/DL (ref 0.7–1.3)
CREAT UR-MCNC: 120.6 MG/DL
DEPRECATED RDW RBC AUTO: 47.2 FL (ref 37–54)
EOSINOPHIL # BLD AUTO: 0.16 10*3/MM3 (ref 0–0.4)
EOSINOPHIL NFR BLD AUTO: 2.5 % (ref 0.3–6.2)
ERYTHROCYTE [DISTWIDTH] IN BLOOD BY AUTOMATED COUNT: 14.4 % (ref 12.3–15.4)
GFR SERPL CREATININE-BSD FRML MDRD: 86 ML/MIN/1.73 (ref 56–130)
GLOBULIN UR ELPH-MCNC: 3.2 GM/DL (ref 2.3–3.5)
GLUCOSE BLD-MCNC: 101 MG/DL (ref 70–99)
HBA1C MFR BLD: 6.27 % (ref 4.8–5.6)
HCT VFR BLD AUTO: 40.4 % (ref 37.5–51)
HDLC SERPL-MCNC: 34 MG/DL (ref 40–59)
HGB BLD-MCNC: 13.1 G/DL (ref 13–17.7)
LDLC SERPL CALC-MCNC: 94 MG/DL
LDLC/HDLC SERPL: 2.75 {RATIO} (ref 0–3.55)
LYMPHOCYTES # BLD AUTO: 3.05 10*3/MM3 (ref 0.7–3.1)
LYMPHOCYTES NFR BLD AUTO: 46.8 % (ref 19.6–45.3)
MCH RBC QN AUTO: 29.6 PG (ref 26.6–33)
MCHC RBC AUTO-ENTMCNC: 32.4 G/DL (ref 31.5–35.7)
MCV RBC AUTO: 91.2 FL (ref 79–97)
MICROALBUMIN/CREAT UR: NORMAL MG/G
MONOCYTES # BLD AUTO: 0.65 10*3/MM3 (ref 0.1–0.9)
MONOCYTES NFR BLD AUTO: 10 % (ref 5–12)
NEUTROPHILS # BLD AUTO: 2.62 10*3/MM3 (ref 1.4–7)
NEUTROPHILS NFR BLD AUTO: 40.1 % (ref 42.7–76)
PLATELET # BLD AUTO: 244 10*3/MM3 (ref 140–450)
PMV BLD AUTO: 8.4 FL (ref 6–12)
POTASSIUM BLD-SCNC: 4 MMOL/L (ref 3.4–5)
PROT SERPL-MCNC: 7.3 G/DL (ref 6.3–8.6)
RBC # BLD AUTO: 4.43 10*6/MM3 (ref 4.14–5.8)
SODIUM BLD-SCNC: 140 MMOL/L (ref 137–145)
TRIGL SERPL-MCNC: 87 MG/DL
TSH SERPL DL<=0.05 MIU/L-ACNC: 2.71 MIU/ML (ref 0.27–4.2)
VLDLC SERPL-MCNC: 17.4 MG/DL
WBC NRBC COR # BLD: 6.52 10*3/MM3 (ref 3.4–10.8)

## 2019-04-01 PROCEDURE — 80053 COMPREHEN METABOLIC PANEL: CPT | Performed by: INTERNAL MEDICINE

## 2019-04-01 PROCEDURE — 84443 ASSAY THYROID STIM HORMONE: CPT | Performed by: INTERNAL MEDICINE

## 2019-04-01 PROCEDURE — 82043 UR ALBUMIN QUANTITATIVE: CPT | Performed by: INTERNAL MEDICINE

## 2019-04-01 PROCEDURE — 80061 LIPID PANEL: CPT | Performed by: INTERNAL MEDICINE

## 2019-04-01 PROCEDURE — 85025 COMPLETE CBC W/AUTO DIFF WBC: CPT | Performed by: INTERNAL MEDICINE

## 2019-04-01 PROCEDURE — 36415 COLL VENOUS BLD VENIPUNCTURE: CPT | Performed by: INTERNAL MEDICINE

## 2019-04-01 PROCEDURE — 83036 HEMOGLOBIN GLYCOSYLATED A1C: CPT | Performed by: INTERNAL MEDICINE

## 2019-04-01 PROCEDURE — 82570 ASSAY OF URINE CREATININE: CPT | Performed by: INTERNAL MEDICINE

## 2019-04-08 ENCOUNTER — OFFICE VISIT (OUTPATIENT)
Dept: FAMILY MEDICINE CLINIC | Facility: CLINIC | Age: 54
End: 2019-04-08

## 2019-04-08 VITALS
TEMPERATURE: 98.2 F | SYSTOLIC BLOOD PRESSURE: 120 MMHG | HEIGHT: 78 IN | HEART RATE: 72 BPM | BODY MASS INDEX: 30.2 KG/M2 | DIASTOLIC BLOOD PRESSURE: 80 MMHG | WEIGHT: 261 LBS

## 2019-04-08 DIAGNOSIS — I10 ESSENTIAL HYPERTENSION: Chronic | ICD-10-CM

## 2019-04-08 DIAGNOSIS — Z12.5 SPECIAL SCREENING FOR MALIGNANT NEOPLASM OF PROSTATE: Chronic | ICD-10-CM

## 2019-04-08 DIAGNOSIS — I48.0 PAROXYSMAL ATRIAL FIBRILLATION (HCC): Chronic | ICD-10-CM

## 2019-04-08 DIAGNOSIS — G47.33 OBSTRUCTIVE SLEEP APNEA SYNDROME: Chronic | ICD-10-CM

## 2019-04-08 DIAGNOSIS — Z12.11 COLON CANCER SCREENING: ICD-10-CM

## 2019-04-08 DIAGNOSIS — E11.9 TYPE 2 DIABETES MELLITUS WITHOUT COMPLICATION, WITHOUT LONG-TERM CURRENT USE OF INSULIN (HCC): Primary | Chronic | ICD-10-CM

## 2019-04-08 DIAGNOSIS — E78.2 MIXED HYPERLIPIDEMIA: Chronic | ICD-10-CM

## 2019-04-08 DIAGNOSIS — K21.9 GASTROESOPHAGEAL REFLUX DISEASE WITHOUT ESOPHAGITIS: Chronic | ICD-10-CM

## 2019-04-08 DIAGNOSIS — E66.09 CLASS 1 OBESITY DUE TO EXCESS CALORIES WITH SERIOUS COMORBIDITY AND BODY MASS INDEX (BMI) OF 30.0 TO 30.9 IN ADULT: Chronic | ICD-10-CM

## 2019-04-08 DIAGNOSIS — F17.200 TOBACCO DEPENDENCE SYNDROME: Chronic | ICD-10-CM

## 2019-04-08 PROCEDURE — 99214 OFFICE O/P EST MOD 30 MIN: CPT | Performed by: INTERNAL MEDICINE

## 2019-04-08 NOTE — PROGRESS NOTES
Subjective        History of Present Illness     Praveen Noonan is a 54 y.o. male for a 6-month follow up on hyperlipidemia, hypertension, tobacco dependence syndrome, GERD, and Type 2 diabetes mellitus diagnosed, 9/2018.  His cardiologist is Dr. Hillman who follows his paroxysmal a-fib, for which he continues on Eliquis and also follows with electrophysiologist. The electrophysiologist stopped his digoxin.  GERD symptoms adequately controlled with Prilosec.  He continues to wear CPAP for obstructive sleep apnea.        Patient was diagnosed with new onset diabetes last fall.  I made gradual adjustments in his diabetes regimen last fall with  A1c improving by 50% currently at 6.27 improved from the 12.6 last fall.  He continues on Amaryl and metformin.  He is no longer taking the Jardiance due to the cost, which went from costing $10 to $250 monthly. His lowest glucose is in the 80s without patient experiencing symptoms of hypoglycemia.    Weight is stable from six months ago remaining in the obesity category.  I encouraged diabetic diet, exercise and weight loss efforts to help achieve weight loss.  Blood pressure at goal.      He continues to smoke approximately a pack of cigarettes daily.  I advised the patient of the risks in continuing to use tobacco products.  Patient is urged to stop smoking and never start again. I sent a prescription for Chantix in the past, but he was concerned with possible side effects.  I offered cessation aids again today, but patient is not currently motivated.     He is due baseline colonoscopy and agrees to schedule this.  We will refer to gastroenterology and discussed how he will need to make the provider aware of his anticoagulation therapy, Eliquis.     The patient's relevant past medical, surgical, and social history was reviewed in Epic.   Lab results are reviewed with the patient today. CBC unremarkable.  Fasting glucose 101. A1c vastly improved at 6.27.  Normal renal and  "liver function.  Thyroid function normal.  Total cholesterol 145 on Lipitor.  HDL below goal at 34.  LDL improved at 94. Triglycerides 87 with improve LDL/HDL ratio at 2.75.        Review of Systems   Constitutional: Negative for chills, fatigue and fever.   HENT: Negative for congestion, ear pain, postnasal drip, sinus pressure and sore throat.    Respiratory: Negative for cough, shortness of breath and wheezing.    Cardiovascular: Negative for chest pain, palpitations and leg swelling.   Gastrointestinal: Negative for abdominal pain, blood in stool, constipation, diarrhea, nausea and vomiting.   Endocrine: Negative for cold intolerance, heat intolerance, polydipsia and polyuria.   Genitourinary: Negative for dysuria, frequency, hematuria and urgency.   Skin: Negative for rash.   Neurological: Negative for syncope and weakness.        Objective     Vitals:    04/08/19 1404   BP: 120/80   Pulse: 72   Temp: 98.2 °F (36.8 °C)   TempSrc: Oral   Weight: 118 kg (261 lb)   Height: 198.1 cm (78\")     Physical Exam   Constitutional: He is oriented to person, place, and time. He appears well-developed and well-nourished. No distress.   Obese male.  Accompanied by his son.    HENT:   Head: Normocephalic and atraumatic.   Nose: Right sinus exhibits no maxillary sinus tenderness and no frontal sinus tenderness. Left sinus exhibits no maxillary sinus tenderness and no frontal sinus tenderness.   Mouth/Throat: Uvula is midline, oropharynx is clear and moist and mucous membranes are normal. No oral lesions. No tonsillar exudate.   Mild nasal congestion on the left.    Eyes: Conjunctivae and EOM are normal. Pupils are equal, round, and reactive to light.   Neck: Trachea normal. Neck supple. No JVD present. Carotid bruit is not present. No tracheal deviation present. No thyroid mass and no thyromegaly present.   Cardiovascular: Normal rate, regular rhythm, normal heart sounds and intact distal pulses.  No extrasystoles are present. " PMI is not displaced.   No murmur heard.  Pulmonary/Chest: Effort normal and breath sounds normal. No accessory muscle usage. No respiratory distress. He has no decreased breath sounds. He has no wheezes. He has no rhonchi. He has no rales.   Abdominal: Soft. Bowel sounds are normal. He exhibits no distension. There is no hepatosplenomegaly. There is no tenderness.   Overweight abdomen.       Vascular Status -  His right foot exhibits normal foot vasculature  and no edema. His left foot exhibits normal foot vasculature  and no edema.  Lymphadenopathy:     He has no cervical adenopathy.   Neurological: He is alert and oriented to person, place, and time. No cranial nerve deficit. Coordination normal.   Skin: Skin is warm, dry and intact. No rash noted. No cyanosis. Nails show no clubbing.   Psychiatric: He has a normal mood and affect. His speech is normal and behavior is normal. Judgment and thought content normal.   Vitals reviewed.      Assessment/Plan      Refer to Dr. Diehl for baseline colonoscopy.  He will need to make him aware of the anticoagualtion therapy, Eliquis.    I advised the patient of the risks in continuing to use tobacco products.  Patient is urged to stop smoking and never start again. Cessation aids are offered.   He is not currently motivated to pursue cessation efforts, but will notify us if he changes his mind.      Continue with metformin and Amaryl to manage diabetes.  He has had remarkable improvement in his A1c going from 12.6 last fall to 6.27.   He is encouraged to continue with diabetic diet and exercise and intensify weight loss efforts.      Continue follow-up appointments with Dr. Hillman, cardiology and electrophysiologist.   Continue 10 mg of Lipitor daily and dietary efforts.     Return in six months for follow up with fasting labs one week prior.      Scribed for Dr. Angel by Vandana Gomes [facility].     Diagnoses and all orders for this visit:    Type 2 diabetes mellitus  without complication, without long-term current use of insulin (CMS/HCC) - diagnosed 9/2018  -     CBC Auto Differential; Future  -     Comprehensive Metabolic Panel; Future  -     Hemoglobin A1c; Future  -     Lipid Panel; Future    Essential hypertension  -     Comprehensive Metabolic Panel; Future    Paroxysmal atrial fibrillation (CMS/HCC)    Mixed hyperlipidemia  -     Lipid Panel; Future    Gastroesophageal reflux disease without esophagitis    Obstructive sleep apnea syndrome - followed by Dr. Gil    Class 1 obesity due to excess calories with serious comorbidity and body mass index (BMI) of 30.0 to 30.9 in adult    Special screening for malignant neoplasm of prostate  -     PSA Screen; Future    Tobacco dependence syndrome - 1ppd    Colon cancer screening  -     Ambulatory Referral to Gastroenterology        Lab on 04/01/2019   Component Date Value Ref Range Status   • WBC 04/01/2019 6.52  3.40 - 10.80 10*3/mm3 Final   • RBC 04/01/2019 4.43  4.14 - 5.80 10*6/mm3 Final   • Hemoglobin 04/01/2019 13.1  13.0 - 17.7 g/dL Final   • Hematocrit 04/01/2019 40.4  37.5 - 51.0 % Final   • MCV 04/01/2019 91.2  79.0 - 97.0 fL Final   • MCH 04/01/2019 29.6  26.6 - 33.0 pg Final   • MCHC 04/01/2019 32.4  31.5 - 35.7 g/dL Final   • RDW 04/01/2019 14.4  12.3 - 15.4 % Final   • RDW-SD 04/01/2019 47.2  37.0 - 54.0 fl Final   • MPV 04/01/2019 8.4  6.0 - 12.0 fL Final   • Platelets 04/01/2019 244  140 - 450 10*3/mm3 Final   • Neutrophil % 04/01/2019 40.1* 42.7 - 76.0 % Final   • Lymphocyte % 04/01/2019 46.8* 19.6 - 45.3 % Final   • Monocyte % 04/01/2019 10.0  5.0 - 12.0 % Final   • Eosinophil % 04/01/2019 2.5  0.3 - 6.2 % Final   • Basophil % 04/01/2019 0.6  0.0 - 1.5 % Final   • Neutrophils, Absolute 04/01/2019 2.62  1.40 - 7.00 10*3/mm3 Final   • Lymphocytes, Absolute 04/01/2019 3.05  0.70 - 3.10 10*3/mm3 Final   • Monocytes, Absolute 04/01/2019 0.65  0.10 - 0.90 10*3/mm3 Final   • Eosinophils, Absolute 04/01/2019 0.16   0.00 - 0.40 10*3/mm3 Final   • Basophils, Absolute 04/01/2019 0.04  0.00 - 0.20 10*3/mm3 Final   • Glucose 04/01/2019 101* 70 - 99 mg/dL Final   • BUN 04/01/2019 15  7 - 23 mg/dL Final   • Creatinine 04/01/2019 1.09  0.70 - 1.30 mg/dL Final   • Sodium 04/01/2019 140  137 - 145 mmol/L Final   • Potassium 04/01/2019 4.0  3.4 - 5.0 mmol/L Final   • Chloride 04/01/2019 105  101 - 112 mmol/L Final   • CO2 04/01/2019 28.0  22.0 - 30.0 mmol/L Final   • Calcium 04/01/2019 9.8  8.4 - 10.2 mg/dL Final   • Total Protein 04/01/2019 7.3  6.3 - 8.6 g/dL Final   • Albumin 04/01/2019 4.10  3.50 - 5.00 g/dL Final   • ALT (SGPT) 04/01/2019 36  <=50 U/L Final   • AST (SGOT) 04/01/2019 28  17 - 59 U/L Final   • Alkaline Phosphatase 04/01/2019 82  38 - 126 U/L Final   • Total Bilirubin 04/01/2019 0.4  0.2 - 1.3 mg/dL Final   • eGFR   Amer 04/01/2019 86  56 - 130 mL/min/1.73 Final   • Globulin 04/01/2019 3.2  2.3 - 3.5 gm/dL Final   • A/G Ratio 04/01/2019 1.3  1.1 - 1.8 g/dL Final   • BUN/Creatinine Ratio 04/01/2019 13.8  7.0 - 25.0 Final   • Anion Gap 04/01/2019 7.0  5.0 - 15.0 mmol/L Final   • Hemoglobin A1C 04/01/2019 6.27* 4.80 - 5.60 % Final   • Total Cholesterol 04/01/2019 145* 150 - 200 mg/dL Final   • Triglycerides 04/01/2019 87  <=150 mg/dL Final   • HDL Cholesterol 04/01/2019 34* 40 - 59 mg/dL Final   • LDL Cholesterol  04/01/2019 94  <=100 mg/dL Final   • VLDL Cholesterol 04/01/2019 17.4  mg/dL Final   • LDL/HDL Ratio 04/01/2019 2.75  0.00 - 3.55 Final   • Microalbumin/Creatinine Ratio 04/01/2019   mg/g Final    Unable to calculate   • Creatinine, Urine 04/01/2019 120.6  mg/dL Final   • Microalbumin, Urine 04/01/2019 <1.2  mg/L Final   • TSH 04/01/2019 2.710  0.270 - 4.200 mIU/mL Final   ]

## 2019-04-08 NOTE — PATIENT INSTRUCTIONS
Exercising to Lose Weight  Exercising can help you to lose weight. In order to lose weight through exercise, you need to do vigorous-intensity exercise. You can tell that you are exercising with vigorous intensity if you are breathing very hard and fast and cannot hold a conversation while exercising.  Moderate-intensity exercise helps to maintain your current weight. You can tell that you are exercising at a moderate level if you have a higher heart rate and faster breathing, but you are still able to hold a conversation.  How often should I exercise?  Choose an activity that you enjoy and set realistic goals. Your health care provider can help you to make an activity plan that works for you. Exercise regularly as directed by your health care provider. This may include:  · Doing resistance training twice each week, such as:  ? Push-ups.  ? Sit-ups.  ? Lifting weights.  ? Using resistance bands.  · Doing a given intensity of exercise for a given amount of time. Choose from these options:  ? 150 minutes of moderate-intensity exercise every week.  ? 75 minutes of vigorous-intensity exercise every week.  ? A mix of moderate-intensity and vigorous-intensity exercise every week.    Children, pregnant women, people who are out of shape, people who are overweight, and older adults may need to consult a health care provider for individual recommendations. If you have any sort of medical condition, be sure to consult your health care provider before starting a new exercise program.  What are some activities that can help me to lose weight?  · Walking at a rate of at least 4.5 miles an hour.  · Jogging or running at a rate of 5 miles per hour.  · Biking at a rate of at least 10 miles per hour.  · Lap swimming.  · Roller-skating or in-line skating.  · Cross-country skiing.  · Vigorous competitive sports, such as football, basketball, and soccer.  · Jumping rope.  · Aerobic dancing.  How can I be more active in my day-to-day  activities?  · Use the stairs instead of the elevator.  · Take a walk during your lunch break.  · If you drive, park your car farther away from work or school.  · If you take public transportation, get off one stop early and walk the rest of the way.  · Make all of your phone calls while standing up and walking around.  · Get up, stretch, and walk around every 30 minutes throughout the day.  What guidelines should I follow while exercising?  · Do not exercise so much that you hurt yourself, feel dizzy, or get very short of breath.  · Consult your health care provider prior to starting a new exercise program.  · Wear comfortable clothes and shoes with good support.  · Drink plenty of water while you exercise to prevent dehydration or heat stroke. Body water is lost during exercise and must be replaced.  · Work out until you breathe faster and your heart beats faster.  This information is not intended to replace advice given to you by your health care provider. Make sure you discuss any questions you have with your health care provider.  Document Released: 01/20/2012 Document Revised: 05/25/2017 Document Reviewed: 05/21/2015  [employer] Interactive Patient Education © 2019 [employer]      Calorie Counting for Weight Loss  Calories are units of energy. Your body needs a certain amount of calories from food to keep you going throughout the day. When you eat more calories than your body needs, your body stores the extra calories as fat. When you eat fewer calories than your body needs, your body burns fat to get the energy it needs.  Calorie counting means keeping track of how many calories you eat and drink each day. Calorie counting can be helpful if you need to lose weight. If you make sure to eat fewer calories than your body needs, you should lose weight. Ask your health care provider what a healthy weight is for you.  For calorie counting to work, you will need to eat the right number of calories in a day in order  to lose a healthy amount of weight per week. A dietitian can help you determine how many calories you need in a day and will give you suggestions on how to reach your calorie goal.  · A healthy amount of weight to lose per week is usually 1-2 lb (0.5-0.9 kg). This usually means that your daily calorie intake should be reduced by 500-750 calories.  · Eating 1,200 - 1,500 calories per day can help most women lose weight.  · Eating 1,500 - 1,800 calories per day can help most men lose weight.    What is my plan?  My goal is to have __________ calories per day.  If I have this many calories per day, I should lose around __________ pounds per week.  What do I need to know about calorie counting?  In order to meet your daily calorie goal, you will need to:  · Find out how many calories are in each food you would like to eat. Try to do this before you eat.  · Decide how much of the food you plan to eat.  · Write down what you ate and how many calories it had. Doing this is called keeping a food log.    To successfully lose weight, it is important to balance calorie counting with a healthy lifestyle that includes regular activity. Aim for 150 minutes of moderate exercise (such as walking) or 75 minutes of vigorous exercise (such as running) each week.  Where do I find calorie information?    The number of calories in a food can be found on a Nutrition Facts label. If a food does not have a Nutrition Facts label, try to look up the calories online or ask your dietitian for help.  Remember that calories are listed per serving. If you choose to have more than one serving of a food, you will have to multiply the calories per serving by the amount of servings you plan to eat. For example, the label on a package of bread might say that a serving size is 1 slice and that there are 90 calories in a serving. If you eat 1 slice, you will have eaten 90 calories. If you eat 2 slices, you will have eaten 180 calories.  How do I keep a  "food log?  Immediately after each meal, record the following information in your food log:  · What you ate. Don't forget to include toppings, sauces, and other extras on the food.  · How much you ate. This can be measured in cups, ounces, or number of items.  · How many calories each food and drink had.  · The total number of calories in the meal.    Keep your food log near you, such as in a small notebook in your pocket, or use a mobile bird or website. Some programs will calculate calories for you and show you how many calories you have left for the day to meet your goal.  What are some calorie counting tips?  · Use your calories on foods and drinks that will fill you up and not leave you hungry:  ? Some examples of foods that fill you up are nuts and nut butters, vegetables, lean proteins, and high-fiber foods like whole grains. High-fiber foods are foods with more than 5 g fiber per serving.  ? Drinks such as sodas, specialty coffee drinks, alcohol, and juices have a lot of calories, yet do not fill you up.  · Eat nutritious foods and avoid empty calories. Empty calories are calories you get from foods or beverages that do not have many vitamins or protein, such as candy, sweets, and soda. It is better to have a nutritious high-calorie food (such as an avocado) than a food with few nutrients (such as a bag of chips).  · Know how many calories are in the foods you eat most often. This will help you calculate calorie counts faster.  · Pay attention to calories in drinks. Low-calorie drinks include water and unsweetened drinks.  · Pay attention to nutrition labels for \"low fat\" or \"fat free\" foods. These foods sometimes have the same amount of calories or more calories than the full fat versions. They also often have added sugar, starch, or salt, to make up for flavor that was removed with the fat.  · Find a way of tracking calories that works for you. Get creative. Try different apps or programs if writing down " calories does not work for you.  What are some portion control tips?  · Know how many calories are in a serving. This will help you know how many servings of a certain food you can have.  · Use a measuring cup to measure serving sizes. You could also try weighing out portions on a kitchen scale. With time, you will be able to estimate serving sizes for some foods.  · Take some time to put servings of different foods on your favorite plates, bowls, and cups so you know what a serving looks like.  · Try not to eat straight from a bag or box. Doing this can lead to overeating. Put the amount you would like to eat in a cup or on a plate to make sure you are eating the right portion.  · Use smaller plates, glasses, and bowls to prevent overeating.  · Try not to multitask (for example, watch TV or use your computer) while eating. If it is time to eat, sit down at a table and enjoy your food. This will help you to know when you are full. It will also help you to be aware of what you are eating and how much you are eating.  What are tips for following this plan?  Reading food labels  · Check the calorie count compared to the serving size. The serving size may be smaller than what you are used to eating.  · Check the source of the calories. Make sure the food you are eating is high in vitamins and protein and low in saturated and trans fats.  Shopping  · Read nutrition labels while you shop. This will help you make healthy decisions before you decide to purchase your food.  · Make a grocery list and stick to it.  Cooking  · Try to cook your favorite foods in a healthier way. For example, try baking instead of frying.  · Use low-fat dairy products.  Meal planning  · Use more fruits and vegetables. Half of your plate should be fruits and vegetables.  · Include lean proteins like poultry and fish.  How do I count calories when eating out?  · Ask for smaller portion sizes.  · Consider sharing an entree and sides instead of  getting your own entree.  · If you get your own entree, eat only half. Ask for a box at the beginning of your meal and put the rest of your entree in it so you are not tempted to eat it.  · If calories are listed on the menu, choose the lower calorie options.  · Choose dishes that include vegetables, fruits, whole grains, low-fat dairy products, and lean protein.  · Choose items that are boiled, broiled, grilled, or steamed. Stay away from items that are buttered, battered, fried, or served with cream sauce. Items labeled “crispy” are usually fried, unless stated otherwise.  · Choose water, low-fat milk, unsweetened iced tea, or other drinks without added sugar. If you want an alcoholic beverage, choose a lower calorie option such as a glass of wine or light beer.  · Ask for dressings, sauces, and syrups on the side. These are usually high in calories, so you should limit the amount you eat.  · If you want a salad, choose a garden salad and ask for grilled meats. Avoid extra toppings like chisholm, cheese, or fried items. Ask for the dressing on the side, or ask for olive oil and vinegar or lemon to use as dressing.  · Estimate how many servings of a food you are given. For example, a serving of cooked rice is ½ cup or about the size of half a baseball. Knowing serving sizes will help you be aware of how much food you are eating at restaurants. The list below tells you how big or small some common portion sizes are based on everyday objects:  ? 1 oz--4 stacked dice.  ? 3 oz--1 deck of cards.  ? 1 tsp--1 die.  ? 1 Tbsp--½ a ping-pong ball.  ? 2 Tbsp--1 ping-pong ball.  ? ½ cup--½ baseball.  ? 1 cup--1 baseball.  Summary  · Calorie counting means keeping track of how many calories you eat and drink each day. If you eat fewer calories than your body needs, you should lose weight.  · A healthy amount of weight to lose per week is usually 1-2 lb (0.5-0.9 kg). This usually means reducing your daily calorie intake by 500-750  calories.  · The number of calories in a food can be found on a Nutrition Facts label. If a food does not have a Nutrition Facts label, try to look up the calories online or ask your dietitian for help.  · Use your calories on foods and drinks that will fill you up, and not on foods and drinks that will leave you hungry.  · Use smaller plates, glasses, and bowls to prevent overeating.  This information is not intended to replace advice given to you by your health care provider. Make sure you discuss any questions you have with your health care provider.  Document Released: 12/18/2006 Document Revised: 11/17/2017 Document Reviewed: 11/17/2017  [employer] Interactive Patient Education © 2019 [employer]      Steps to Quit Smoking  Smoking tobacco can be harmful to your health and can affect almost every organ in your body. Smoking puts you, and those around you, at risk for developing many serious chronic diseases. Quitting smoking is difficult, but it is one of the best things that you can do for your health. It is never too late to quit.  What are the benefits of quitting smoking?  When you quit smoking, you lower your risk of developing serious diseases and conditions, such as:  · Lung cancer or lung disease, such as COPD.  · Heart disease.  · Stroke.  · Heart attack.  · Infertility.  · Osteoporosis and bone fractures.    Additionally, symptoms such as coughing, wheezing, and shortness of breath may get better when you quit. You may also find that you get sick less often because your body is stronger at fighting off colds and infections. If you are pregnant, quitting smoking can help to reduce your chances of having a baby of low birth weight.  How do I get ready to quit?  When you decide to quit smoking, create a plan to make sure that you are successful. Before you quit:  · Pick a date to quit. Set a date within the next two weeks to give you time to prepare.  · Write down the reasons why you are quitting. Keep  this list in places where you will see it often, such as on your bathroom mirror or in your car or wallet.  · Identify the people, places, things, and activities that make you want to smoke (triggers) and avoid them. Make sure to take these actions:  ? Throw away all cigarettes at home, at work, and in your car.  ? Throw away smoking accessories, such as ashtrays and lighters.  ? Clean your car and make sure to empty the ashtray.  ? Clean your home, including curtains and carpets.  · Tell your family, friends, and coworkers that you are quitting. Support from your loved ones can make quitting easier.  · Talk with your health care provider about your options for quitting smoking.  · Find out what treatment options are covered by your health insurance.    What strategies can I use to quit smoking?  Talk with your healthcare provider about different strategies to quit smoking. Some strategies include:  · Quitting smoking altogether instead of gradually lessening how much you smoke over a period of time. Research shows that quitting “cold turkey” is more successful than gradually quitting.  · Attending in-person counseling to help you build problem-solving skills. You are more likely to have success in quitting if you attend several counseling sessions. Even short sessions of 10 minutes can be effective.  · Finding resources and support systems that can help you to quit smoking and remain smoke-free after you quit. These resources are most helpful when you use them often. They can include:  ? Online chats with a counselor.  ? Telephone quitlines.  ? Printed self-help materials.  ? Support groups or group counseling.  ? Text messaging programs.  ? Mobile phone applications.  · Taking medicines to help you quit smoking. (If you are pregnant or breastfeeding, talk with your health care provider first.) Some medicines contain nicotine and some do not. Both types of medicines help with cravings, but the medicines that  include nicotine help to relieve withdrawal symptoms. Your health care provider may recommend:  ? Nicotine patches, gum, or lozenges.  ? Nicotine inhalers or sprays.  ? Non-nicotine medicine that is taken by mouth.    Talk with your health care provider about combining strategies, such as taking medicines while you are also receiving in-person counseling. Using these two strategies together makes you more likely to succeed in quitting than if you used either strategy on its own.  If you are pregnant or breastfeeding, talk with your health care provider about finding counseling or other support strategies to quit smoking. Do not take medicine to help you quit smoking unless told to do so by your health care provider.  What things can I do to make it easier to quit?  Quitting smoking might feel overwhelming at first, but there is a lot that you can do to make it easier. Take these important actions:  · Reach out to your family and friends and ask that they support and encourage you during this time. Call telephone quitlines, reach out to support groups, or work with a counselor for support.  · Ask people who smoke to avoid smoking around you.  · Avoid places that trigger you to smoke, such as bars, parties, or smoke-break areas at work.  · Spend time around people who do not smoke.  · Lessen stress in your life, because stress can be a smoking trigger for some people. To lessen stress, try:  ? Exercising regularly.  ? Deep-breathing exercises.  ? Yoga.  ? Meditating.  ? Performing a body scan. This involves closing your eyes, scanning your body from head to toe, and noticing which parts of your body are particularly tense. Purposefully relax the muscles in those areas.  · Download or purchase mobile phone or tablet apps (applications) that can help you stick to your quit plan by providing reminders, tips, and encouragement. There are many free apps, such as QuitGuide from the CDC (Centers for Disease Control and  Prevention). You can find other support for quitting smoking (smoking cessation) through smokefree.gov and other websites.    How will I feel when I quit smoking?  Within the first 24 hours of quitting smoking, you may start to feel some withdrawal symptoms. These symptoms are usually most noticeable 2-3 days after quitting, but they usually do not last beyond 2-3 weeks. Changes or symptoms that you might experience include:  · Mood swings.  · Restlessness, anxiety, or irritation.  · Difficulty concentrating.  · Dizziness.  · Strong cravings for sugary foods in addition to nicotine.  · Mild weight gain.  · Constipation.  · Nausea.  · Coughing or a sore throat.  · Changes in how your medicines work in your body.  · A depressed mood.  · Difficulty sleeping (insomnia).    After the first 2-3 weeks of quitting, you may start to notice more positive results, such as:  · Improved sense of smell and taste.  · Decreased coughing and sore throat.  · Slower heart rate.  · Lower blood pressure.  · Clearer skin.  · The ability to breathe more easily.  · Fewer sick days.    Quitting smoking is very challenging for most people. Do not get discouraged if you are not successful the first time. Some people need to make many attempts to quit before they achieve long-term success. Do your best to stick to your quit plan, and talk with your health care provider if you have any questions or concerns.  This information is not intended to replace advice given to you by your health care provider. Make sure you discuss any questions you have with your health care provider.  Document Released: 12/12/2002 Document Revised: 07/24/2018 Document Reviewed: 05/03/2016  [employer] Interactive Patient Education © 2019 [employer]

## 2019-04-19 RX ORDER — LANCETS 28 GAUGE
EACH MISCELLANEOUS
Qty: 100 EACH | Refills: 3 | Status: SHIPPED | OUTPATIENT
Start: 2019-04-19

## 2019-05-09 ENCOUNTER — OFFICE VISIT (OUTPATIENT)
Dept: GASTROENTEROLOGY | Facility: CLINIC | Age: 54
End: 2019-05-09

## 2019-05-09 VITALS — OXYGEN SATURATION: 98 % | BODY MASS INDEX: 30.55 KG/M2 | HEIGHT: 78 IN | HEART RATE: 99 BPM | WEIGHT: 264 LBS

## 2019-05-09 DIAGNOSIS — I48.0 PAROXYSMAL ATRIAL FIBRILLATION (HCC): Chronic | ICD-10-CM

## 2019-05-09 DIAGNOSIS — Z79.01 ANTICOAGULATED: ICD-10-CM

## 2019-05-09 DIAGNOSIS — Z12.11 ENCOUNTER FOR SCREENING FOR MALIGNANT NEOPLASM OF COLON: Primary | ICD-10-CM

## 2019-05-09 PROCEDURE — 99213 OFFICE O/P EST LOW 20 MIN: CPT | Performed by: INTERNAL MEDICINE

## 2019-05-09 RX ORDER — SODIUM, POTASSIUM,MAG SULFATES 17.5-3.13G
1 SOLUTION, RECONSTITUTED, ORAL ORAL EVERY 12 HOURS
Qty: 1 BOTTLE | Refills: 0 | Status: SHIPPED | OUTPATIENT
Start: 2019-05-09 | End: 2019-05-13

## 2019-05-09 RX ORDER — DEXTROSE AND SODIUM CHLORIDE 5; .45 G/100ML; G/100ML
30 INJECTION, SOLUTION INTRAVENOUS CONTINUOUS PRN
Status: CANCELLED | OUTPATIENT
Start: 2019-07-26

## 2019-05-09 NOTE — PATIENT INSTRUCTIONS
Colorectal Cancer Screening  Colorectal cancer screening is a group of tests used to check for colorectal cancer. Colorectal refers to your colon and rectum. Your colon and rectum are located at the end of your large intestine and carry your bowel movements out of your body.  Why is colorectal cancer screening done?  It is common for abnormal growths (polyps) to form in the lining of your colon, especially as you get older. These polyps can be cancerous or become cancerous. If colorectal cancer is found at an early stage, it is treatable.  Who should be screened for colorectal cancer?  Screening is recommended for all adults at average risk starting at age 50. Tests may be recommended every 1 to 10 years. Your health care provider may recommend earlier or more frequent screening if you have:  · A history of colorectal cancer or polyps.  · A family member with a history of colorectal cancer or polyps.  · Inflammatory bowel disease, such as ulcerative colitis or Crohn disease.  · A type of hereditary colon cancer syndrome.  · Colorectal cancer symptoms.    Types of screening tests  There are several types of colorectal screening tests. They include:  · Guaiac-based fecal occult blood testing.  · Fecal immunochemical test (FIT).  · Stool DNA test.  · Barium enema.  · Virtual colonoscopy.  · Sigmoidoscopy. During this test, a sigmoidoscope is used to examine your rectum and lower colon. A sigmoidoscope is a flexible tube with a camera that is inserted through your anus into your rectum and lower colon.  · Colonoscopy. During this test, a colonoscope is used to examine your entire colon. A colonoscope is a long, thin, flexible tube with a camera. This test examines your entire colon and rectum.    This information is not intended to replace advice given to you by your health care provider. Make sure you discuss any questions you have with your health care provider.  Document Released: 06/07/2011 Document Revised:  07/27/2017 Document Reviewed: 03/26/2015  [employer] Interactive Patient Education © 2018 [employer]  BMI for Adults  Body mass index (BMI) is a number that is calculated from a person's weight and height. In most adults, the number is used to find how much of an adult's weight is made up of fat. BMI is not as accurate as a direct measure of body fat.  How is BMI calculated?  BMI is calculated by dividing weight in kilograms by height in meters squared. It can also be calculated by dividing weight in pounds by height in inches squared, then multiplying the resulting number by 703. Charts are available to help you find your BMI quickly and easily without doing this calculation.  How is BMI interpreted?  Health care professionals use BMI charts to identify whether an adult is underweight, at a normal weight, or overweight based on the following guidelines:  · Underweight: BMI less than 18.5.  · Normal weight: BMI between 18.5 and 24.9.  · Overweight: BMI between 25 and 29.9.  · Obese: BMI of 30 and above.    BMI is usually interpreted the same for males and females.  Weight includes both fat and muscle, so someone with a muscular build, such as an athlete, may have a BMI that is higher than 24.9. In cases like these, BMI may not accurately depict body fat. To determine if excess body fat is the cause of a BMI of 25 or higher, further assessments may need to be done by a health care provider.  Why is BMI a useful tool?  BMI is used to identify a possible weight problem that may be related to a medical problem or may increase the risk for medical problems. BMI can also be used to promote changes to reach a healthy weight.  This information is not intended to replace advice given to you by your health care provider. Make sure you discuss any questions you have with your health care provider.  Document Released: 08/29/2005 Document Revised: 04/27/2017 Document Reviewed: 05/15/2015  [employer] Patient Education  © 2018 [employer]

## 2019-05-09 NOTE — PROGRESS NOTES
[facility]      Chief Complaint:   Chief Complaint   Patient presents with   • Colonoscopy Consultation       Subjective     HPI:   For screening colonoscopy.  Patient has a history of A. fib and is currently on Eliquis for this.  Patient occasionally comes into and out of A. fib.  Patient will need to come off Eliquis for 3 days prior to procedure.  Patient to discuss with primary doctor if it is okay for him to do this.  If patient cannot come off the medication will need to do the procedure with her on the medicine and if polyps are found we will need to repeat colonoscopy again.    Plan; we will schedule patient for screening colonoscopy    Past Medical History:   Past Medical History:   Diagnosis Date   • Atrial fibrillation (CMS/HCC)      New onset, 12/2015      • Essential hypertension      Lisinopril HCT changed to Toprol-XL due to new onset atrial fibrillation, 12/2015      • GERD (gastroesophageal reflux disease)      On omeprazole Qam and also zantac QHS prn      • Hyperlipidemia      improved with Zocor. Zocor changed to Lipitor by Dr. blount, 12/2015      • Obesity    • Obstructive sleep apnea syndrome - followed by Dr. Gil 8/2/2017   • Osteoarthritis of knee     - R>L      • Paroxysmal atrial fibrillation (CMS/HCC)      New onset, 12/2015      • Tobacco dependence syndrome    • Type 2 diabetes mellitus without complication, without long-term current use of insulin (CMS/HCC) 9/21/2018       Family History:  Family History   Problem Relation Age of Onset   • Hypertension Mother    • Cerebral aneurysm Mother    • Hypertension Father    • Coronary artery disease Father         CABG in his 50's   • Prostate cancer Father    • Hypertension Brother        Social History:   reports that he has been smoking cigarettes.  He has been smoking about 0.50 packs per day. He has never used smokeless tobacco. He reports that he does not drink alcohol or use drugs.    Medications:   Prior to  Admission medications    Medication Sig Start Date End Date Taking? Authorizing Provider   apixaban (ELIQUIS) 5 MG tablet tablet Take 5 mg by mouth 2 (Two) Times a Day. 10/11/18  Yes Garland Vicente MD   atorvastatin (LIPITOR) 10 MG tablet TAKE ONE TABLET BY MOUTH ONCE DAILY FOR CHOLESTEROL 9/20/18  Yes Juarez Angel MD   diltiaZEM CD (CARTIA XT) 240 MG 24 hr capsule Take 240 mg by mouth Daily.   Yes Garland Vicente MD   glimepiride (AMARYL) 1 MG tablet Take 1 tablet by mouth Every Morning. For diabetes 11/5/18  Yes Juarez Angel MD   glucose blood test strip Check one time daily  E11.9 9/21/18  Yes Juarez Angel MD   glucose monitor monitoring kit Check glucose daily E11.9 9/21/18  Yes Juarez Angel MD   irbesartan (AVAPRO) 300 MG tablet Take 300 mg by mouth Every Night.   Yes Garland Vicente MD   Lancets (FREESTYLE) lancets USE 1  TO CHECK GLUCOSE ONCE DAILY 4/19/19  Yes Juarez Angel MD   metFORMIN (GLUCOPHAGE) 850 MG tablet Take 1 tablet by mouth 2 (Two) Times a Day. For diabetes 11/5/18  Yes Juarez Angel MD   naproxen (EC NAPROSYN) 500 MG EC tablet Take 1 tablet by mouth 2 (Two) Times a Day With Meals.  Patient taking differently: Take 500 mg by mouth 2 (Two) Times a Day As Needed (With Meals). 5/23/18  Yes Joel Salas MD   omeprazole (priLOSEC) 40 MG capsule TAKE 1 CAPSULE BY MOUTH ONCE DAILY 9/28/18  Yes Juarez Angel MD   spironolactone-hydrochlorothiazide (ALDACTAZIDE) 25-25 MG tablet Take 1 tablet by mouth daily.   Yes Garland Vicente MD   albuterol (PROVENTIL HFA;VENTOLIN HFA) 108 (90 BASE) MCG/ACT inhaler Inhale 2 puffs every 4 (four) hours as needed for wheezing.    Garland Vicente MD   sildenafil (REVATIO) 20 MG tablet 3 tablets 1 hour prior to intercourse 8/2/17   Juarez Angel MD   sodium-potassium-magnesium sulfates (SUPREP) 17.5-3.13-1.6 GM/177ML solution oral solution Take 1 bottle by mouth Every 12 (Twelve) Hours. 5/9/19   Angel Diehl MD  "      Allergies:  Patient has no known allergies.    ROS:    Review of Systems   Constitutional: Negative for activity change, appetite change and unexpected weight change.   HENT: Negative for congestion, sore throat and trouble swallowing.    Respiratory: Negative for cough, choking and shortness of breath.    Cardiovascular: Negative for chest pain.   Gastrointestinal: Negative for abdominal distention, abdominal pain, anal bleeding, blood in stool, constipation, diarrhea, nausea, rectal pain and vomiting.   Endocrine: Negative for heat intolerance, polydipsia and polyphagia.   Genitourinary: Negative for difficulty urinating.   Musculoskeletal: Negative for arthralgias.   Skin: Negative for color change, pallor, rash and wound.   Allergic/Immunologic: Negative for food allergies.   Neurological: Negative for dizziness, syncope, weakness and headaches.   Psychiatric/Behavioral: Negative for agitation, behavioral problems, confusion and decreased concentration.     Objective     Pulse 99, height 198.1 cm (78\"), weight 120 kg (264 lb), SpO2 98 %.    Physical Exam   Constitutional: He is oriented to person, place, and time. He appears well-developed and well-nourished. No distress.   HENT:   Head: Normocephalic and atraumatic.   Cardiovascular: Normal rate, regular rhythm, normal heart sounds and intact distal pulses. Exam reveals no gallop and no friction rub.   No murmur heard.  Pulmonary/Chest: Breath sounds normal. No respiratory distress. He has no wheezes. He has no rales. He exhibits no tenderness.   Abdominal: Soft. Bowel sounds are normal. He exhibits no distension and no mass. There is no tenderness. There is no rebound and no guarding. No hernia.   Musculoskeletal: Normal range of motion. He exhibits no edema.   Neurological: He is alert and oriented to person, place, and time.   Skin: Skin is warm and dry. No rash noted. He is not diaphoretic. No erythema. No pallor.   Psychiatric: He has a normal mood " and affect. His behavior is normal. Judgment and thought content normal.        Assessment/Plan   Praveen was seen today for colonoscopy consultation.    Diagnoses and all orders for this visit:    Encounter for screening for malignant neoplasm of colon  -     Case Request; Standing  -     dextrose 5 % and sodium chloride 0.45 % infusion  -     Case Request    Paroxysmal atrial fibrillation (CMS/HCC)    Anticoagulated    Other orders  -     Follow Anesthesia Guidelines / Standing Orders; Future  -     Obtain Informed Consent; Future  -     Implement Anesthesia Orders Day of Procedure; Standing  -     Obtain Informed Consent; Standing  -     POC Glucose Once; Standing  -     Insert Peripheral IV; Standing  -     sodium-potassium-magnesium sulfates (SUPREP) 17.5-3.13-1.6 GM/177ML solution oral solution; Take 1 bottle by mouth Every 12 (Twelve) Hours.        COLONOSCOPY (N/A)     Diagnosis Plan   1. Encounter for screening for malignant neoplasm of colon  Case Request    dextrose 5 % and sodium chloride 0.45 % infusion    Case Request   2. Paroxysmal atrial fibrillation (CMS/HCC)     3. Anticoagulated         Anticipated Surgical Procedure:  Orders Placed This Encounter   Procedures   • Follow Anesthesia Guidelines / Standing Orders     Standing Status:   Future   • Obtain Informed Consent     Standing Status:   Future     Order Specific Question:   Informed Consent Given For     Answer:   colonoscopy       The risks, benefits, and alternatives of this procedure have been discussed with the patient or the responsible party- the patient understands and agrees to proceed.

## 2019-05-13 ENCOUNTER — TELEPHONE (OUTPATIENT)
Dept: GASTROENTEROLOGY | Facility: CLINIC | Age: 54
End: 2019-05-13

## 2019-05-13 NOTE — TELEPHONE ENCOUNTER
05/13/2019, 1435 - Patient telephoned per this staff member [phone].  Zero answer.  Voice message submitted with date, time, office contact information, notification facsimile received per patient's pharmacy of choice, [facility], [place], [place], sating Suprep Bowel Preparation with a cost of $99.00, and notification this staff member submitted alternate bowel preparation, Golytely, to pharmacy via E-Scribe with instructions for utilization submitted to pharmacy via facsimile.

## 2019-07-26 ENCOUNTER — HOSPITAL ENCOUNTER (OUTPATIENT)
Facility: HOSPITAL | Age: 54
Setting detail: HOSPITAL OUTPATIENT SURGERY
Discharge: HOME | End: 2019-07-26
Attending: INTERNAL MEDICINE | Admitting: INTERNAL MEDICINE

## 2019-07-26 ENCOUNTER — ANESTHESIA (OUTPATIENT)
Dept: GASTROENTEROLOGY | Facility: HOSPITAL | Age: 54
End: 2019-07-26

## 2019-07-26 ENCOUNTER — ANESTHESIA EVENT (OUTPATIENT)
Dept: GASTROENTEROLOGY | Facility: HOSPITAL | Age: 54
End: 2019-07-26

## 2019-07-26 VITALS
OXYGEN SATURATION: 96 % | DIASTOLIC BLOOD PRESSURE: 80 MMHG | SYSTOLIC BLOOD PRESSURE: 118 MMHG | HEIGHT: 77 IN | TEMPERATURE: 96.5 F | BODY MASS INDEX: 31.11 KG/M2 | HEART RATE: 68 BPM | RESPIRATION RATE: 16 BRPM | WEIGHT: 263.45 LBS

## 2019-07-26 DIAGNOSIS — Z12.11 ENCOUNTER FOR SCREENING FOR MALIGNANT NEOPLASM OF COLON: ICD-10-CM

## 2019-07-26 PROCEDURE — 25010000002 FENTANYL CITRATE (PF) 100 MCG/2ML SOLUTION: Performed by: NURSE ANESTHETIST, CERTIFIED REGISTERED

## 2019-07-26 PROCEDURE — 25010000002 PROPOFOL 10 MG/ML EMULSION: Performed by: NURSE ANESTHETIST, CERTIFIED REGISTERED

## 2019-07-26 PROCEDURE — 88305 TISSUE EXAM BY PATHOLOGIST: CPT | Performed by: PATHOLOGY

## 2019-07-26 PROCEDURE — 88305 TISSUE EXAM BY PATHOLOGIST: CPT | Performed by: INTERNAL MEDICINE

## 2019-07-26 PROCEDURE — 45385 COLONOSCOPY W/LESION REMOVAL: CPT | Performed by: INTERNAL MEDICINE

## 2019-07-26 RX ORDER — DEXTROSE AND SODIUM CHLORIDE 5; .45 G/100ML; G/100ML
30 INJECTION, SOLUTION INTRAVENOUS CONTINUOUS PRN
Status: DISCONTINUED | OUTPATIENT
Start: 2019-07-26 | End: 2019-07-26 | Stop reason: HOSPADM

## 2019-07-26 RX ORDER — PROPOFOL 10 MG/ML
VIAL (ML) INTRAVENOUS AS NEEDED
Status: DISCONTINUED | OUTPATIENT
Start: 2019-07-26 | End: 2019-07-26 | Stop reason: SURG

## 2019-07-26 RX ORDER — FENTANYL CITRATE 50 UG/ML
INJECTION, SOLUTION INTRAMUSCULAR; INTRAVENOUS AS NEEDED
Status: DISCONTINUED | OUTPATIENT
Start: 2019-07-26 | End: 2019-07-26 | Stop reason: SURG

## 2019-07-26 RX ADMIN — PROPOFOL 30 MG: 10 INJECTION, EMULSION INTRAVENOUS at 14:15

## 2019-07-26 RX ADMIN — PROPOFOL 40 MG: 10 INJECTION, EMULSION INTRAVENOUS at 14:12

## 2019-07-26 RX ADMIN — FENTANYL CITRATE 50 MCG: 50 INJECTION, SOLUTION INTRAMUSCULAR; INTRAVENOUS at 14:05

## 2019-07-26 RX ADMIN — PROPOFOL 80 MG: 10 INJECTION, EMULSION INTRAVENOUS at 14:06

## 2019-07-26 RX ADMIN — DEXTROSE AND SODIUM CHLORIDE 30 ML/HR: 5; 450 INJECTION, SOLUTION INTRAVENOUS at 13:29

## 2019-07-26 RX ADMIN — PROPOFOL 30 MG: 10 INJECTION, EMULSION INTRAVENOUS at 14:09

## 2019-07-26 RX ADMIN — FENTANYL CITRATE 50 MCG: 50 INJECTION, SOLUTION INTRAMUSCULAR; INTRAVENOUS at 14:10

## 2019-07-26 NOTE — ANESTHESIA POSTPROCEDURE EVALUATION
Patient: Praveen Noonan    Procedure Summary     Date:  07/26/19 Room / Location:  [facility]COPY [facility] / [facility]    Anesthesia Start:  1403 Anesthesia Stop:  1417    Procedure:  COLONOSCOPY (N/A ) Diagnosis:       Encounter for screening for malignant neoplasm of colon      (Encounter for screening for malignant neoplasm of colon [Z12.11])    Surgeon:  Angel Diehl MD Provider:  Haresh Dover CRNA    Anesthesia Type:  MAC ASA Status:  3          Anesthesia Type: MAC  Last vitals  BP   144/81 (07/26/19 1324)   Temp   96.3 °F (35.7 °C) (07/26/19 1324)   Pulse   77 (07/26/19 1324)   Resp   20 (07/26/19 1324)     SpO2   96 % (07/26/19 1324)     Post Anesthesia Care and Evaluation    Patient location during evaluation: bedside  Patient participation: complete - patient participated  Level of consciousness: awake and awake and alert  Pain score: 0  Pain management: satisfactory to patient  Airway patency: patent  Anesthetic complications: No anesthetic complications  PONV Status: none  Cardiovascular status: acceptable and stable  Respiratory status: acceptable, room air and spontaneous ventilation  Hydration status: acceptable

## 2019-07-26 NOTE — ANESTHESIA PREPROCEDURE EVALUATION
Anesthesia Evaluation     Patient summary reviewed and Nursing notes reviewed   NPO Solid Status: > 8 hours  NPO Liquid Status: > 4 hours           Airway   Mallampati: II  TM distance: >3 FB  Neck ROM: full  No difficulty expected  Dental    (+) poor dentition    Pulmonary - normal exam   (+) a smoker Current Smoked day of surgery, sleep apnea,   Cardiovascular - normal exam    (+) hypertension, dysrhythmias Paroxysmal Atrial Fib, Atrial Fib, hyperlipidemia,       Neuro/Psych- negative ROS  GI/Hepatic/Renal/Endo    (+) obesity, morbid obesity, GERD,  diabetes mellitus type 2,     Musculoskeletal     Abdominal   (+) obese,    Substance History - negative use     OB/GYN          Other   (+) arthritis                     Anesthesia Plan    ASA 3     MAC     intravenous induction   Anesthetic plan, all risks, benefits, and alternatives have been provided, discussed and informed consent has been obtained with: patient.

## 2019-07-29 LAB
LAB AP CASE REPORT: NORMAL
PATH REPORT.FINAL DX SPEC: NORMAL
PATH REPORT.GROSS SPEC: NORMAL

## 2019-08-29 ENCOUNTER — OFFICE VISIT (OUTPATIENT)
Dept: GASTROENTEROLOGY | Facility: CLINIC | Age: 54
End: 2019-08-29

## 2019-08-29 VITALS
DIASTOLIC BLOOD PRESSURE: 66 MMHG | BODY MASS INDEX: 31.24 KG/M2 | HEART RATE: 91 BPM | SYSTOLIC BLOOD PRESSURE: 116 MMHG | OXYGEN SATURATION: 98 % | HEIGHT: 78 IN | WEIGHT: 270 LBS

## 2019-08-29 DIAGNOSIS — K57.30 DIVERTICULOSIS OF LARGE INTESTINE WITHOUT HEMORRHAGE: Primary | ICD-10-CM

## 2019-08-29 PROCEDURE — 99212 OFFICE O/P EST SF 10 MIN: CPT | Performed by: INTERNAL MEDICINE

## 2019-08-29 NOTE — PATIENT INSTRUCTIONS
What You Need to Know About Warfarin  Warfarin is a blood thinner (anticoagulant). Anticoagulants help to prevent the formation of blood clots. They also help to stop the growth of blood clots.  Who should use warfarin?  Warfarin is prescribed for people who are at risk for developing harmful blood clots, such as people who have:  · Surgically implanted mechanical heart valves.  · Irregular heart rhythms (atrial fibrillation).  · Certain clotting disorders.  · A history of harmful blood clotting in the past. This includes people who have had:  ? A stroke.  ? Blood clot in the lungs (pulmonary embolism, or PE).  ? Blood clot in the legs (deep vein thrombosis, or DVT).  · An existing blood clot.  How is warfarin taken?    Warfarin is a medicine that you take by mouth (orally). Warfarin tablets come in different strengths. Each tablet strength is a different color, with the amount of warfarin printed on the tablet. If you get a new prescription filled and the color of your tablet is different than usual, tell your pharmacist or health care provider immediately.  What blood tests do I need while taking warfarin?  The goal of warfarin therapy is to lessen the clotting tendency of blood, but not to prevent clotting completely. Your health care provider will monitor the anticoagulation effect of warfarin closely and will adjust your dose as needed.  Warfarin is a medicine that needs to be closely monitored, so it is very important to keep all lab visits and follow-up visits with your health care provider. While taking warfarin, you will need to have blood tests (prothrombin tests, or PT tests) regularly to measure your blood clotting time. This type of test can be done with a finger stick or a blood draw.  What does the INR test result mean?  The PT test results will be reported as the International Normalized Ratio (INR). The INR tells your health care provider whether your dosage of warfarin needs to be changed. The  longer it takes your blood to clot, the higher the INR.  Your health care provider will tell you your target INR range. If your INR is not in your target range, your health care provider may adjust your dosage.  · If your INR is above your target range, there is a risk of bleeding. Your dosage of warfarin may need to be decreased.  · If your INR is below your target range, there is a risk of clotting. Your dosage of warfarin may need to be increased.  How often is the INR test needed?  · When you first start warfarin, you will usually have your INR checked every few days.  · You may need to have INR tests done more than once a week until you are taking the correct dosage of warfarin.  · After you have reached your target INR, your INR will be tested less often. However, you will need to have your INR checked at least once every 4-6 weeks for the entire time you are taking warfarin.  What are the side effects of warfarin?  Too much warfarin can cause bleeding (hemorrhage) in any part of the body, such as:  · Bleeding from the gums.  · Unexplained bruises.  · Bruises that get larger.  · Blood in the urine.  · Bloody or dark stools.  · Bleeding in the brain (hemorrhagic stroke).  · A nosebleed that is not easily stopped.  · Coughing up blood.  · Vomiting blood.  Warfarin use may also cause:  · Skin rash or irritations  · Nausea that does not go away.  · Severe pain in the back or joints.  · Painful toes that turn blue or purple (purple toe syndrome).  · Painful ulcers that do not go away (skin necrosis).  What are the signs and symptoms of a blood clot?  Too little warfarin can increase the risk of blood clots in your legs, lungs, or arms.  Signs and symptoms of a DVT in your leg or arm may include:  · Pain or swelling in your leg or arm.  · Skin that is red or warm to the touch on your arm or leg.  Signs and symptoms of a pulmonary embolism may include:  · Shortness of breath or difficulty breathing.  · Chest  pain.  · Unexplained fever.  What are the signs and symptoms of a stroke?  If you are taking too much or too little warfarin, you can have a stroke. Signs and symptoms of a stroke may include:  · Weakness or numbness of your face, arm, or leg, especially on one side of your body.  · Confusion or trouble thinking clearly.  · Difficulty seeing with one or both eyes.  · Difficulty walking or moving your arms or legs.  · Dizziness.  · Loss of balance or coordination.  · Trouble speaking, trouble understanding speech, or both (aphasia).  · Sudden, severe headache with no known cause.  · Partial or total loss of consciousness.  What precautions do I need to take while using warfarin?  · Take warfarin exactly as told by your health care provider. Doing this helps you avoid bleeding or blood clots that could result in serious injury, pain, or disability.  · Take your medicine at the same time every day. If you forget to take your dose of warfarin, take it as soon as you remember that day. If you do not remember on that day, do not take an extra dose the next day.  · Contact your health care provider if you miss or take an extra dose. Do not change your dosage on your own to make up for missed or extra doses.  · Wear or carry identification that says that you are taking warfarin.  · Make sure that all health care providers, including your dentist, know you are taking warfarin.  · If you need surgery, talk with your health care provider about whether you should stop taking warfarin before your surgery.  · Avoid situations that cause bleeding. You may bleed more easily while taking warfarin. To limit bleeding, take the following actions:  ? Use a softer toothbrush.  ? Floss with waxed floss, not unwaxed floss.  ? Shave with an electric razor, not with a blade.  ? Limit your use of sharp objects.  ? Avoid potentially harmful activities, such as contact sports.  What do I need to know about warfarin and pregnancy or  breastfeeding?  · Warfarin is not recommended during the first trimester of pregnancy due to an increased risk of birth defects. In certain situations, a woman may take warfarin after her first trimester of pregnancy.  · If you are taking warfarin and you become pregnant or plan to become pregnant, contact your health care provider right away.  · If you plan to breastfeed while taking warfarin, talk with your health care provider first.  What do I need to know about warfarin and alcohol or drug use?  · Avoid drinking alcohol, or limit alcohol intake to no more than 1 drink a day for nonpregnant women and 2 drinks a day for men. One drink equals 12 oz of beer, 5 oz of wine, or 1½ oz of hard liquor.  ? If you change the amount of alcohol that you drink, tell your health care provider. Your warfarin dosage may need to be changed.  · Avoid tobacco products, such as cigarettes, chewing tobacco, and e-cigarettes. If you need help quitting, ask your health care provider.  ? If you change the amount of nicotine or tobacco that you use, tell your health care provider. Your warfarin dosage may need to be changed.  · Avoid street drugs while taking warfarin. The effects of street drugs on warfarin are not known.  What do I need to know about warfarin and other medicines or supplements?  · Many prescription and over-the-counter medicines can interfere with warfarin. Talk with your health care provider or your pharmacist before starting or stopping any new medicines. This includes over-the-counter vitamins, dietary supplements, herbal medicines, and pain medicines. Your warfarin dosage may need to be adjusted.  · Some common over-the-counter medicines that may increase the risk of bleeding while taking warfarin include:  ? Acetaminophen.  ? Aspirin.  ? NSAIDs, such as ibuprofen or naproxen.  ? Vitamin E.  What do I need to know about warfarin and my diet?  · It is important to maintain a normal, balanced diet while taking  warfarin. Avoid major changes in your diet. If you are going to change your diet, talk with your health care provider before making changes.  · Your health care provider may recommend that you work with a diet and nutrition specialist (dietitian).  · Vitamin K decreases the effect of warfarin, and it is found in many foods. Eat a consistent amount of foods that contain vitamin K. For example, you may decide to eat 2 vitamin K-containing foods each day.  Most foods that are high in vitamin K are green and leafy. Common foods that contain high amounts of vitamin K include:  · Kale, raw or cooked.  · Spinach, raw or cooked.  · Collards, raw or cooked.  · Swiss chard, raw or cooked.  · Mustard greens, raw or cooked.  · Turnip greens, raw or cooked.  · Parsley, raw.  · Broccoli, cooked.  · Noodles, eggs, and spinach, enriched.  · [place] sprouts, raw or cooked.  · Beet greens, raw or cooked.  · Endive, raw.  · Cabbage, cooked.  · Asparagus, cooked.  Foods that contain moderate amounts of vitamin K include:  · Broccoli, raw.  · Cabbage, raw.  · Bok mike, cooked.  · Green leaf lettuce, raw  · Prunes, stewed.  · Pickles.  · Kiwi.  · Edamame, cooked.  · [place] lettuce, raw.  · Avocado.  · Tuna, canned in oil.  · Okra, cooked.  · Black-eyed peas, cooked.  · Green beans, cooked or raw.  · Blueberries, raw.  · Blackberries, raw.  · Peas, cooked or raw.  Contact a health care provider if:  · You miss a dose.  · You take an extra dose.  · You plan to have any kind of surgery or procedure.  · You are unable to take your medicine due to nausea, vomiting, or diarrhea.  · You have any major changes in your diet or you plan to make any major changes in your diet.  · You start or stop any over-the-counter medicine, prescription medicine, or dietary supplement.  · You become pregnant, plan to become pregnant, or think you may be pregnant.  · You have menstrual periods that are heavier than usual.  · You have unusual bruising.  Get  help right away if:  · You develop symptoms of an allergic reaction, such as:  ? Swelling of the lips, face, tongue, mouth, or throat.  ? Rash.  ? Itching.  ? Itchy, red, swollen areas of skin (hives).  ? Trouble breathing.  ? Chest tightness.  · You have:  ? Signs or symptoms of a stroke.  ? Signs or symptoms of a blood clot.  ? A fall or have an accident, especially if you hit your head.  ? Blood in your urine. Your urine may look reddish, pinkish, or tea-colored.  ? Blood in your stool. Your stool may be black or bright red.  ? Bleeding that does not stop after applying pressure to the area for 30 minutes.  ? Severe pain in your joints or back.  ? Purple or blue toes.  ? Skin ulcers that do not go away.  · You vomit blood or cough up blood. The blood may be bright red, or it may look like coffee grounds.  These symptoms may represent a serious problem that is an emergency. Do not wait to see if the symptoms will go away. Get medical help right away. Call your local emergency services (911 in the [place] Do not drive yourself to the hospital.  Summary  · Warfarin needs to be closely monitored with blood tests. It is very important to keep all lab visits and follow-up visits with your health care provider.  · Make sure that you know your target INR range and your warfarin dosage.  · Wear or carry identification that says that you are taking warfarin.  · Take warfarin at the same time every day. Call your health care provider if you miss a dose or if you take an extra dose. Do not change the dosage of warfarin on your own.  · Know the signs and symptoms of blood clots, bleeding, and a stroke. Know when to get emergency medical help.  · Tell all health care providers who care for you that you are taking warfarin.  · Talk with your health care provider or your pharmacist before starting or stopping any new medicines.  · Monitor how much vitamin K you eat every day. Try to eat the same amount every day.  This  information is not intended to replace advice given to you by your health care provider. Make sure you discuss any questions you have with your health care provider.  Document Released: 12/18/2006 Document Revised: 04/23/2018 Document Reviewed: 03/15/2017  [employer] Interactive Patient Education © 2019 [employer]  Atrial Fibrillation    Atrial fibrillation is a type of heartbeat that is irregular or fast (rapid). If you have this condition, your heart beats without any order. This makes it hard for your heart to pump blood in a normal way. Having this condition gives you more risk for stroke, heart failure, and other heart problems.  Atrial fibrillation may start all of a sudden and then stop on its own, or it may become a long-lasting problem.  What are the causes?  This condition may be caused by heart conditions, such as:  · High blood pressure.  · Heart failure.  · Heart valve disease.  · Heart surgery.  Other causes include:  · Pneumonia.  · Obstructive sleep apnea.  · Lung cancer.  · Thyroid disease.  · Drinking too much alcohol.  Sometimes the cause is not known.  What increases the risk?  You are more likely to develop this condition if:  · You smoke.  · You are older.  · You have diabetes.  · You are overweight.  · You have a family history of this condition.  · You exercise often and hard.  What are the signs or symptoms?  Common symptoms of this condition include:  · A feeling like your heart is beating very fast.  · Chest pain.  · Feeling short of breath.  · Feeling light-headed or weak.  · Getting tired easily.  Follow these instructions at home:  Medicines  · Take over-the-counter and prescription medicines only as told by your doctor.  · If your doctor gives you a blood-thinning medicine, take it exactly as told. Taking too much of it can cause bleeding. Taking too little of it does not protect you against clots. Clots can cause a stroke.  Lifestyle    · Do not use any tobacco products. These  "include cigarettes, chewing tobacco, and e-cigarettes. If you need help quitting, ask your doctor.  · Do not drink alcohol.  · Do not drink beverages that have caffeine. These include coffee, soda, and tea.  · Follow diet instructions as told by your doctor.  · Exercise regularly as told by your doctor.  General instructions  · If you have a condition that causes breathing to stop for a short period of time (apnea), treat it as told by your doctor.  · Keep a healthy weight. Do not use diet pills unless your doctor says they are safe for you. Diet pills may make heart problems worse.  · Keep all follow-up visits as told by your doctor. This is important.  Contact a doctor if:  · You notice a change in the speed, rhythm, or strength of your heartbeat.  · You are taking a blood-thinning medicine and you see more bruising.  · You get tired more easily when you move or exercise.  · You have a sudden change in weight.  Get help right away if:    · You have pain in your chest or your belly (abdomen).  · You have trouble breathing.  · You have blood in your vomit, poop, or pee (urine).  · You have any signs of a stroke. \"BE FAST\" is an easy way to remember the main warning signs:  ? B - Balance. Signs are dizziness, sudden trouble walking, or loss of balance.  ? E - Eyes. Signs are trouble seeing or a change in how you see.  ? F - Face. Signs are sudden weakness or loss of feeling in the face, or the face or eyelid drooping on one side.  ? A - Arms. Signs are weakness or loss of feeling in an arm. This happens suddenly and usually on one side of the body.  ? S - Speech. Signs are sudden trouble speaking, slurred speech, or trouble understanding what people say.  ? T - Time. Time to call emergency services. Write down what time symptoms started.  · You have other signs of a stroke, such as:  ? A sudden, very bad headache with no known cause.  ? Feeling sick to your stomach (nausea).  ? Throwing up (vomiting).  ? Jerky " movements you cannot control (seizure).  These symptoms may be an emergency. Do not wait to see if the symptoms will go away. Get medical help right away. Call your local emergency services (911 in the [place] Do not drive yourself to the hospital.  Summary  · Atrial fibrillation is a type of heartbeat that is irregular or fast (rapid).  · You are at higher risk of this condition if you smoke, are older, have diabetes, or are overweight.  · Follow your doctor's instructions about medicines, diet, exercise, and follow-up visits.  · Get help right away if you think that you have signs of a stroke.  This information is not intended to replace advice given to you by your health care provider. Make sure you discuss any questions you have with your health care provider.  Document Released: 09/26/2009 Document Revised: 02/08/2019 Document Reviewed: 02/08/2019  [employer] Interactive Patient Education © 2019 [employer]  BMI for Adults    Body mass index (BMI) is a number that is calculated from a person's weight and height. BMI may help to estimate how much of a person's weight is composed of fat. BMI can help identify those who may be at higher risk for certain medical problems.  How is BMI used with adults?  BMI is used as a screening tool to identify possible weight problems. It is used to check whether a person is obese, overweight, healthy weight, or underweight.  How is BMI calculated?  BMI measures your weight and compares it to your height. This can be done either in English ([place] or metric measurements. Note that charts are available to help you find your BMI quickly and easily without having to do these calculations yourself.  To calculate your BMI in English ([place] measurements, your health care provider will:  1. Measure your weight in pounds (lb).  2. Multiply the number of pounds by 703.  ? For example, for a person who weighs 180 lb, multiply that number by 703, which equals 126,540.  3. Measure your height  "in inches (in). Then multiply that number by itself to get a measurement called \"inches squared.\"  ? For example, for a person who is 70 in tall, the \"inches squared\" measurement is 70 in x 70 in, which equals 4900 inches squared.  4. Divide the total from Step 2 (number of lb x 703) by the total from Step 3 (inches squared): 126,540 ÷ 4900 = 25.8. This is your BMI.  To calculate your BMI in metric measurements, your health care provider will:  1. Measure your weight in kilograms (kg).  2. Measure your height in meters (m). Then multiply that number by itself to get a measurement called \"meters squared.\"  ? For example, for a person who is 1.75 m tall, the \"meters squared\" measurement is 1.75 m x 1.75 m, which is equal to 3.1 meters squared.  3. Divide the number of kilograms (your weight) by the meters squared number. In this example: 70 ÷ 3.1 = 22.6. This is your BMI.  How is BMI interpreted?  To interpret your results, your health care provider will use BMI charts to identify whether you are underweight, normal weight, overweight, or obese. The following guidelines will be used:  · Underweight: BMI less than 18.5.  · Normal weight: BMI between 18.5 and 24.9.  · Overweight: BMI between 25 and 29.9.  · Obese: BMI of 30 and above.  Please note:  · Weight includes both fat and muscle, so someone with a muscular build, such as an athlete, may have a BMI that is higher than 24.9. In cases like these, BMI is not an accurate measure of body fat.  · To determine if excess body fat is the cause of a BMI of 25 or higher, further assessments may need to be done by a health care provider.  · BMI is usually interpreted in the same way for men and women.  Why is BMI a useful tool?  BMI is useful in two ways:  · Identifying a weight problem that may be related to a medical condition, or that may increase the risk for medical problems.  · Promoting lifestyle and diet changes in order to reach a healthy weight.  Summary  · Body " mass index (BMI) is a number that is calculated from a person's weight and height.  · BMI may help to estimate how much of a person's weight is composed of fat. BMI can help identify those who may be at higher risk for certain medical problems.  · BMI can be measured using English measurements or metric measurements.  · To interpret your results, your health care provider will use BMI charts to identify whether you are underweight, normal weight, overweight, or obese.  This information is not intended to replace advice given to you by your health care provider. Make sure you discuss any questions you have with your health care provider.  Document Released: 08/29/2005 Document Revised: 10/31/2018 Document Reviewed: 10/31/2018  [employer] Interactive Patient Education © 2019 [employer]

## 2019-08-29 NOTE — PROGRESS NOTES
[facility]      Chief Complaint:   Chief Complaint   Patient presents with   • Screening Colonoscopy     Performed 07/26/2019   • Atrial Fibrillation   • Anticoagulation       Subjective     HPI:   For follow-up after colonoscopy.  Patient had a polyp in the ascending colon which is a serrated adenoma.  Patient denies any difficulties after the colonoscopy.  Patient also had diverticular disease.  Discussed with patient that he will need a repeat colonoscopy in 3 years.  Patient will also need to take 1 tablespoon of Benefiber daily.    Plan; 1 patient follow-up in 3 years for repeat colonoscopy    Past Medical History:   Past Medical History:   Diagnosis Date   • Atrial fibrillation (CMS/HCC)      New onset, 12/2015      • Essential hypertension      Lisinopril HCT changed to Toprol-XL due to new onset atrial fibrillation, 12/2015      • GERD (gastroesophageal reflux disease)      On omeprazole Qam and also zantac QHS prn      • Hyperlipidemia      improved with Zocor. Zocor changed to Lipitor by Dr. blount, 12/2015      • Obesity    • Obstructive sleep apnea syndrome - followed by Dr. Gil 8/2/2017   • Osteoarthritis of knee     - R>L      • Paroxysmal atrial fibrillation (CMS/HCC)      New onset, 12/2015      • Tobacco dependence syndrome    • Type 2 diabetes mellitus without complication, without long-term current use of insulin (CMS/HCC) 9/21/2018       Past Surgical History:  Past Surgical History:   Procedure Laterality Date   • COLONOSCOPY N/A 7/26/2019    Procedure: COLONOSCOPY;  Surgeon: Angel Diehl MD;  Location: [facility] ENDOSCOPY;  Service: Gastroenterology   • INCISION AND DRAINAGE ABSCESS  09/27/2015   • OTHER SURGICAL HISTORY  06/2016    Ablation   • PILONIDAL CYSTECTOMY  2014    Extensive. Spring 2014. Dr. Purcell   • TIBIA FRACTURE SURGERY         Family History:  Family History   Problem Relation Age of Onset   • Hypertension Mother    • Cerebral aneurysm Mother     • Other Mother         Heart Condition   • Hypertension Father    • Coronary artery disease Father         CABG In His 50's   • Prostate cancer Father    • Hypertension Brother    • Diabetes Other        Social History:   reports that he has been smoking cigarettes.  He has been smoking about 0.50 packs per day. He has never used smokeless tobacco. He reports that he does not drink alcohol or use drugs.    Medications:   Prior to Admission medications    Medication Sig Start Date End Date Taking? Authorizing Provider   albuterol (PROVENTIL HFA;VENTOLIN HFA) 108 (90 BASE) MCG/ACT inhaler Inhale 2 puffs every 4 (four) hours as needed for wheezing.   Yes Garland Vicente MD   apixaban (ELIQUIS) 5 MG tablet tablet Take 5 mg by mouth 2 (Two) Times a Day. 10/11/18  Yes Garland Vicente MD   atorvastatin (LIPITOR) 10 MG tablet TAKE ONE TABLET BY MOUTH ONCE DAILY FOR CHOLESTEROL 9/20/18  Yes Juarez Angel MD   diltiaZEM CD (CARTIA XT) 240 MG 24 hr capsule Take 240 mg by mouth Daily.   Yes Garland Vicente MD   glimepiride (AMARYL) 1 MG tablet Take 1 tablet by mouth Every Morning. For diabetes 11/5/18  Yes Juarez Angel MD   glucose blood test strip Check one time daily  E11.9 9/21/18  Yes Juarez Angel MD   glucose monitor monitoring kit Check glucose daily E11.9 9/21/18  Yes Juarez Angel MD   irbesartan (AVAPRO) 300 MG tablet Take 300 mg by mouth Every Night.   Yes Garland Vicente MD   Lancets (FREESTYLE) lancets USE 1  TO CHECK GLUCOSE ONCE DAILY 4/19/19  Yes Juarez Angel MD   metFORMIN (GLUCOPHAGE) 850 MG tablet Take 1 tablet by mouth 2 (Two) Times a Day. For diabetes 11/5/18  Yes Juarez Angel MD   omeprazole (priLOSEC) 40 MG capsule TAKE 1 CAPSULE BY MOUTH ONCE DAILY 9/28/18  Yes Juarez Angel MD   sildenafil (REVATIO) 20 MG tablet 3 tablets 1 hour prior to intercourse 8/2/17  Yes Juarez Angel MD   spironolactone-hydrochlorothiazide (ALDACTAZIDE) 25-25 MG tablet Take 1 tablet by mouth  "daily.   Yes Provider, Historical, MD       Allergies:  Patient has no known allergies.    ROS:    Review of Systems   Constitutional: Negative for activity change, appetite change and unexpected weight change.   HENT: Negative for congestion, sore throat and trouble swallowing.    Respiratory: Negative for cough, choking and shortness of breath.    Cardiovascular: Negative for chest pain.   Gastrointestinal: Negative for abdominal distention, abdominal pain, anal bleeding, blood in stool, constipation, diarrhea, nausea, rectal pain and vomiting.   Endocrine: Negative for heat intolerance, polydipsia and polyphagia.   Genitourinary: Negative for difficulty urinating.   Musculoskeletal: Negative for arthralgias.   Skin: Negative for color change, pallor, rash and wound.   Allergic/Immunologic: Negative for food allergies.   Neurological: Negative for dizziness, syncope, weakness and headaches.   Psychiatric/Behavioral: Negative for agitation, behavioral problems, confusion and decreased concentration.     Objective     Blood pressure 116/66, pulse 91, height 198.1 cm (78\"), weight 122 kg (270 lb), SpO2 98 %.    Physical Exam   Constitutional: He is oriented to person, place, and time. He appears well-developed and well-nourished. No distress.   HENT:   Head: Normocephalic and atraumatic.   Cardiovascular: Normal rate, regular rhythm, normal heart sounds and intact distal pulses. Exam reveals no gallop and no friction rub.   No murmur heard.  Pulmonary/Chest: Breath sounds normal. No respiratory distress. He has no wheezes. He has no rales. He exhibits no tenderness.   Abdominal: Soft. Bowel sounds are normal. He exhibits no distension and no mass. There is no tenderness. There is no rebound and no guarding. No hernia.   Musculoskeletal: Normal range of motion. He exhibits no edema.   Neurological: He is alert and oriented to person, place, and time.   Skin: Skin is warm and dry. No rash noted. He is not diaphoretic. " No erythema. No pallor.   Psychiatric: He has a normal mood and affect. His behavior is normal. Judgment and thought content normal.        Assessment/Plan   Praveen was seen today for screening colonoscopy, atrial fibrillation and anticoagulation.    Diagnoses and all orders for this visit:    Diverticulosis of large intestine without hemorrhage        * Surgery not found *     Diagnosis Plan   1. Diverticulosis of large intestine without hemorrhage         Anticipated Surgical Procedure:  No orders of the defined types were placed in this encounter.      The risks, benefits, and alternatives of this procedure have been discussed with the patient or the responsible party- the patient understands and agrees to proceed.

## 2019-09-24 RX ORDER — ATORVASTATIN CALCIUM 10 MG/1
TABLET, FILM COATED ORAL
Qty: 30 TABLET | Refills: 11 | Status: SHIPPED | OUTPATIENT
Start: 2019-09-24 | End: 2020-10-19

## 2019-10-07 ENCOUNTER — LAB (OUTPATIENT)
Dept: LAB | Facility: OTHER | Age: 54
End: 2019-10-07

## 2019-10-07 DIAGNOSIS — I10 ESSENTIAL HYPERTENSION: Chronic | ICD-10-CM

## 2019-10-07 DIAGNOSIS — E78.2 MIXED HYPERLIPIDEMIA: Chronic | ICD-10-CM

## 2019-10-07 DIAGNOSIS — Z12.5 SPECIAL SCREENING FOR MALIGNANT NEOPLASM OF PROSTATE: Chronic | ICD-10-CM

## 2019-10-07 DIAGNOSIS — E11.9 TYPE 2 DIABETES MELLITUS WITHOUT COMPLICATION, WITHOUT LONG-TERM CURRENT USE OF INSULIN (HCC): Chronic | ICD-10-CM

## 2019-10-07 LAB
ALBUMIN SERPL-MCNC: 4.2 G/DL (ref 3.5–5)
ALBUMIN/GLOB SERPL: 1.2 G/DL (ref 1.1–1.8)
ALP SERPL-CCNC: 96 U/L (ref 38–126)
ALT SERPL W P-5'-P-CCNC: 36 U/L
ANION GAP SERPL CALCULATED.3IONS-SCNC: 8 MMOL/L (ref 5–15)
AST SERPL-CCNC: 32 U/L (ref 17–59)
BASOPHILS # BLD AUTO: 0.05 10*3/MM3 (ref 0–0.2)
BASOPHILS NFR BLD AUTO: 0.7 % (ref 0–1.5)
BILIRUB SERPL-MCNC: 0.4 MG/DL (ref 0.2–1.3)
BUN BLD-MCNC: 20 MG/DL (ref 7–23)
BUN/CREAT SERPL: 17.5 (ref 7–25)
CALCIUM SPEC-SCNC: 10.4 MG/DL (ref 8.4–10.2)
CHLORIDE SERPL-SCNC: 104 MMOL/L (ref 101–112)
CHOLEST SERPL-MCNC: 162 MG/DL (ref 150–200)
CO2 SERPL-SCNC: 29 MMOL/L (ref 22–30)
CREAT BLD-MCNC: 1.14 MG/DL (ref 0.7–1.3)
DEPRECATED RDW RBC AUTO: 47.1 FL (ref 37–54)
EOSINOPHIL # BLD AUTO: 0.21 10*3/MM3 (ref 0–0.4)
EOSINOPHIL NFR BLD AUTO: 2.8 % (ref 0.3–6.2)
ERYTHROCYTE [DISTWIDTH] IN BLOOD BY AUTOMATED COUNT: 14.2 % (ref 12.3–15.4)
GFR SERPL CREATININE-BSD FRML MDRD: 82 ML/MIN/1.73 (ref 56–130)
GLOBULIN UR ELPH-MCNC: 3.4 GM/DL (ref 2.3–3.5)
GLUCOSE BLD-MCNC: 105 MG/DL (ref 70–99)
HBA1C MFR BLD: 6.2 % (ref 4.8–5.6)
HCT VFR BLD AUTO: 42.1 % (ref 37.5–51)
HDLC SERPL-MCNC: 35 MG/DL (ref 40–59)
HGB BLD-MCNC: 14 G/DL (ref 13–17.7)
LDLC SERPL CALC-MCNC: 105 MG/DL
LDLC/HDLC SERPL: 2.99 {RATIO} (ref 0–3.55)
LYMPHOCYTES # BLD AUTO: 3.7 10*3/MM3 (ref 0.7–3.1)
LYMPHOCYTES NFR BLD AUTO: 49.3 % (ref 19.6–45.3)
MCH RBC QN AUTO: 30.9 PG (ref 26.6–33)
MCHC RBC AUTO-ENTMCNC: 33.3 G/DL (ref 31.5–35.7)
MCV RBC AUTO: 92.9 FL (ref 79–97)
MONOCYTES # BLD AUTO: 0.82 10*3/MM3 (ref 0.1–0.9)
MONOCYTES NFR BLD AUTO: 10.9 % (ref 5–12)
NEUTROPHILS # BLD AUTO: 2.73 10*3/MM3 (ref 1.7–7)
NEUTROPHILS NFR BLD AUTO: 36.3 % (ref 42.7–76)
PLATELET # BLD AUTO: 293 10*3/MM3 (ref 140–450)
PMV BLD AUTO: 8.8 FL (ref 6–12)
POTASSIUM BLD-SCNC: 3.9 MMOL/L (ref 3.4–5)
PROT SERPL-MCNC: 7.6 G/DL (ref 6.3–8.6)
PSA SERPL-MCNC: 0.1 NG/ML (ref 0–4)
RBC # BLD AUTO: 4.53 10*6/MM3 (ref 4.14–5.8)
SODIUM BLD-SCNC: 141 MMOL/L (ref 137–145)
TRIGL SERPL-MCNC: 112 MG/DL
VLDLC SERPL-MCNC: 22.4 MG/DL
WBC NRBC COR # BLD: 7.51 10*3/MM3 (ref 3.4–10.8)

## 2019-10-07 PROCEDURE — 85025 COMPLETE CBC W/AUTO DIFF WBC: CPT | Performed by: INTERNAL MEDICINE

## 2019-10-07 PROCEDURE — G0103 PSA SCREENING: HCPCS | Performed by: INTERNAL MEDICINE

## 2019-10-07 PROCEDURE — 36415 COLL VENOUS BLD VENIPUNCTURE: CPT | Performed by: INTERNAL MEDICINE

## 2019-10-07 PROCEDURE — 80061 LIPID PANEL: CPT | Performed by: INTERNAL MEDICINE

## 2019-10-07 PROCEDURE — 83036 HEMOGLOBIN GLYCOSYLATED A1C: CPT | Performed by: INTERNAL MEDICINE

## 2019-10-07 PROCEDURE — 80053 COMPREHEN METABOLIC PANEL: CPT | Performed by: INTERNAL MEDICINE

## 2019-10-11 RX ORDER — OMEPRAZOLE 40 MG/1
CAPSULE, DELAYED RELEASE ORAL
Qty: 30 CAPSULE | Refills: 11 | Status: SHIPPED | OUTPATIENT
Start: 2019-10-11 | End: 2020-10-19

## 2019-10-14 ENCOUNTER — OFFICE VISIT (OUTPATIENT)
Dept: FAMILY MEDICINE CLINIC | Facility: CLINIC | Age: 54
End: 2019-10-14

## 2019-10-14 VITALS
HEIGHT: 78 IN | BODY MASS INDEX: 32.53 KG/M2 | TEMPERATURE: 98.3 F | SYSTOLIC BLOOD PRESSURE: 138 MMHG | WEIGHT: 281.2 LBS | HEART RATE: 88 BPM | DIASTOLIC BLOOD PRESSURE: 80 MMHG

## 2019-10-14 DIAGNOSIS — E78.2 MIXED HYPERLIPIDEMIA: Chronic | ICD-10-CM

## 2019-10-14 DIAGNOSIS — F17.200 TOBACCO DEPENDENCE SYNDROME: Chronic | ICD-10-CM

## 2019-10-14 DIAGNOSIS — G47.33 OBSTRUCTIVE SLEEP APNEA SYNDROME: Chronic | ICD-10-CM

## 2019-10-14 DIAGNOSIS — E66.09 CLASS 1 OBESITY DUE TO EXCESS CALORIES WITH SERIOUS COMORBIDITY AND BODY MASS INDEX (BMI) OF 32.0 TO 32.9 IN ADULT: Chronic | ICD-10-CM

## 2019-10-14 DIAGNOSIS — K57.30 DIVERTICULOSIS OF LARGE INTESTINE WITHOUT HEMORRHAGE: ICD-10-CM

## 2019-10-14 DIAGNOSIS — E11.9 TYPE 2 DIABETES MELLITUS WITHOUT COMPLICATION, WITHOUT LONG-TERM CURRENT USE OF INSULIN (HCC): Chronic | ICD-10-CM

## 2019-10-14 DIAGNOSIS — I48.0 PAROXYSMAL ATRIAL FIBRILLATION (HCC): Chronic | ICD-10-CM

## 2019-10-14 DIAGNOSIS — K21.9 GASTROESOPHAGEAL REFLUX DISEASE WITHOUT ESOPHAGITIS: Chronic | ICD-10-CM

## 2019-10-14 DIAGNOSIS — I10 ESSENTIAL HYPERTENSION: Primary | Chronic | ICD-10-CM

## 2019-10-14 PROCEDURE — 90471 IMMUNIZATION ADMIN: CPT | Performed by: INTERNAL MEDICINE

## 2019-10-14 PROCEDURE — 90674 CCIIV4 VAC NO PRSV 0.5 ML IM: CPT | Performed by: INTERNAL MEDICINE

## 2019-10-14 PROCEDURE — 99214 OFFICE O/P EST MOD 30 MIN: CPT | Performed by: INTERNAL MEDICINE

## 2019-10-14 NOTE — PROGRESS NOTES
Subjective        History of Present Illness     Praveen Noonan is a 54 y.o. malewho comes in forfor a 6-month follow up on hyperlipidemia, hypertension, tobacco dependence syndrome, GERD, and Type 2 diabetes mellitus diagnosed, 9/2018.   He continues to work at [employer] where he has worked for the past 25 years.   His cardiologist is Dr. Hillman who follows his paroxysmal a-fib, for which he continues on Eliquis and also follows with electrophysiologist, Jackie Kumar.  He denies flares of the atrial fib unless he is experiencing increased anxiety/panic attacks, which he reports as only occasional.  He is compliant with CPAP to manage obstructive sleep apnea.  GERD/gastritis symptoms adequately managed with Prilosec.      He reports acute symptoms of sinus congestion and white nasal discharge, for which saline nasal spray and OTC Coricidin has partially relieved symptoms. We discussed using sinus irrigation with Neti bottle using distilled water.       Patient was diagnosed with new onset diabetes last fall.  I started him on metformin and Amaryl and made gradual adjustments in his diabetes regimen last fall with significant improvement in A1c decreasing from 12.6 to 6.2, which has remained stable at 6.2 over the past six months.   However, weight is up 11 pounds in the past two months, which he attributes to late night snacking.  We reviewed diabetic diet and encouraged him to decrease late night snacking substituting fruit or vegetables for the empty calorie snacks.      Patient had colonoscopy by Dr. Diehl 07/26/2019 revealing one small serrated adenomatous polyp in the sigmoid colon and many diverticula with repeat recommended in three years.  He has a strong family history of cancer, prostate cancer in his father, although, no specific history of colon cancer.      He continues to smoke a little less than one pack of cigarettes daily.  I advised the patient of the risks in continuing to use  "tobacco products.  Patient is urged to stop smoking and never start again. I sent a prescription for Chantix in the past, but he was concerned with possible side effects.      The patient's relevant past medical, surgical, and social history was reviewed in Epic.   Lab results are reviewed with the patient today.  CBC unremarkable.  Fasting glucose 105.  Normal renal and liver function.  Total cholesterol 162.  HDL 35.  .  Triglycerides 112.  LDL/HDL ratio 2.99.  PSA normal limits.      Review of Systems   Constitutional: Negative for chills, fatigue and fever.   HENT: Negative for congestion, ear pain, postnasal drip, sinus pressure and sore throat.    Respiratory: Negative for cough, shortness of breath and wheezing.    Cardiovascular: Negative for chest pain, palpitations and leg swelling.   Gastrointestinal: Negative for abdominal pain, blood in stool, constipation, diarrhea, nausea and vomiting.   Endocrine: Negative for cold intolerance, heat intolerance, polydipsia and polyuria.   Genitourinary: Negative for dysuria, frequency, hematuria and urgency.   Skin: Negative for rash.   Neurological: Negative for syncope and weakness.        Objective     Visit Vitals  /80   Pulse 88   Temp 98.3 °F (36.8 °C) (Oral)   Ht 198.1 cm (78\")   Wt 128 kg (281 lb 3.2 oz)   BMI 32.50 kg/m²       Physical Exam   Constitutional: He is oriented to person, place, and time. He appears well-developed and well-nourished. No distress.   HENT:   Head: Normocephalic and atraumatic.   Nose: Right sinus exhibits no maxillary sinus tenderness and no frontal sinus tenderness. Left sinus exhibits no maxillary sinus tenderness and no frontal sinus tenderness.   Mouth/Throat: Uvula is midline, oropharynx is clear and moist and mucous membranes are normal. No oral lesions. No tonsillar exudate.   Very crowded posterior oropharynx and clear postnasal drip.  Mild right maxillary sinus tenderness and nasal congestion.  Benign smokers " changes posterior oropharynx.      Eyes: Conjunctivae and EOM are normal. Pupils are equal, round, and reactive to light.   Neck: Trachea normal. Neck supple. No JVD present. Carotid bruit is not present. No tracheal deviation present. No thyroid mass and no thyromegaly present.   Cardiovascular: Normal rate, regular rhythm, normal heart sounds and intact distal pulses.  No extrasystoles are present. PMI is not displaced.   No murmur heard.  Pulmonary/Chest: Effort normal and breath sounds normal. No accessory muscle usage. No respiratory distress. He has no decreased breath sounds. He has no wheezes. He has no rhonchi. He has no rales.   Abdominal: Soft. Bowel sounds are normal. He exhibits no distension. There is no hepatosplenomegaly. There is no tenderness.     Vascular Status -  His right foot exhibits normal foot vasculature  and no edema. His left foot exhibits normal foot vasculature  and no edema.  Lymphadenopathy:     He has no cervical adenopathy.   Neurological: He is alert and oriented to person, place, and time. No cranial nerve deficit. Coordination normal.   Skin: Skin is warm, dry and intact. No rash noted. No cyanosis. Nails show no clubbing.   Psychiatric: He has a normal mood and affect. His speech is normal and behavior is normal. Judgment and thought content normal.   Vitals reviewed.          Assessment/Plan      I advised the patient of the risks in continuing to use tobacco products.  Patient is urged to stop smoking and never start again. Cessation aids are offered.   He is not currently motivated to pursue cessation efforts, but will notify us if he changes his mind.      After informed verbal consent, patient is given influenza vaccine today without difficulty.  He tolerated well.      Continue diltiazem CD, Avapro, and Aldactazide.  Pursue sodium restriction and aggressive weight loss.     Continue with metformin and Amaryl to manage diabetes.  He has had remarkable improvement in his  A1c going from 12.6 last fall to 6.2.   He is encouraged to pursue diabetic diet, exercise, and weight loss efforts.  Decrease late night snacking substituting fruit or vegetables for the empty calorie snacks.      Continue follow-up appointments with Dr. Hillman, cardiology and electrophysiologist.       Continue 10 mg of Lipitor daily and dietary efforts.      Return in six months for follow up with fasting labs one week prior.     Scribed for Dr. Angel by [facility].     Diagnoses and all orders for this visit:    Essential hypertension  -     CBC Auto Differential; Future  -     Comprehensive Metabolic Panel; Future    Mixed hyperlipidemia  -     Lipid Panel; Future    Paroxysmal atrial fibrillation (CMS/HCC)    Obstructive sleep apnea syndrome - followed by Dr. Gil    Diverticulosis of large intestine without hemorrhage    Gastroesophageal reflux disease without esophagitis    Class 1 obesity due to excess calories with serious comorbidity and body mass index (BMI) of 32.0 to 32.9 in adult    Type 2 diabetes mellitus without complication, without long-term current use of insulin (CMS/HCC) - diagnosed 9/2018  -     CBC Auto Differential; Future  -     Comprehensive Metabolic Panel; Future  -     Hemoglobin A1c; Future  -     Microalbumin / Creatinine Urine Ratio - Urine, Clean Catch; Future  -     TSH; Future    Tobacco dependence syndrome - 1ppd    Other orders  -     Flucelvax Quad=>4Years (PFS)        Lab on 10/07/2019   Component Date Value Ref Range Status   • WBC 10/07/2019 7.51  3.40 - 10.80 10*3/mm3 Final   • RBC 10/07/2019 4.53  4.14 - 5.80 10*6/mm3 Final   • Hemoglobin 10/07/2019 14.0  13.0 - 17.7 g/dL Final   • Hematocrit 10/07/2019 42.1  37.5 - 51.0 % Final   • MCV 10/07/2019 92.9  79.0 - 97.0 fL Final   • MCH 10/07/2019 30.9  26.6 - 33.0 pg Final   • MCHC 10/07/2019 33.3  31.5 - 35.7 g/dL Final   • RDW 10/07/2019 14.2  12.3 - 15.4 % Final   • RDW-SD 10/07/2019 47.1  37.0 - 54.0 fl  Final   • MPV 10/07/2019 8.8  6.0 - 12.0 fL Final   • Platelets 10/07/2019 293  140 - 450 10*3/mm3 Final   • Neutrophil % 10/07/2019 36.3* 42.7 - 76.0 % Final   • Lymphocyte % 10/07/2019 49.3* 19.6 - 45.3 % Final   • Monocyte % 10/07/2019 10.9  5.0 - 12.0 % Final   • Eosinophil % 10/07/2019 2.8  0.3 - 6.2 % Final   • Basophil % 10/07/2019 0.7  0.0 - 1.5 % Final   • Neutrophils, Absolute 10/07/2019 2.73  1.70 - 7.00 10*3/mm3 Final   • Lymphocytes, Absolute 10/07/2019 3.70* 0.70 - 3.10 10*3/mm3 Final   • Monocytes, Absolute 10/07/2019 0.82  0.10 - 0.90 10*3/mm3 Final   • Eosinophils, Absolute 10/07/2019 0.21  0.00 - 0.40 10*3/mm3 Final   • Basophils, Absolute 10/07/2019 0.05  0.00 - 0.20 10*3/mm3 Final   • Glucose 10/07/2019 105* 70 - 99 mg/dL Final   • BUN 10/07/2019 20  7 - 23 mg/dL Final   • Creatinine 10/07/2019 1.14  0.70 - 1.30 mg/dL Final   • Sodium 10/07/2019 141  137 - 145 mmol/L Final   • Potassium 10/07/2019 3.9  3.4 - 5.0 mmol/L Final   • Chloride 10/07/2019 104  101 - 112 mmol/L Final   • CO2 10/07/2019 29.0  22.0 - 30.0 mmol/L Final   • Calcium 10/07/2019 10.4* 8.4 - 10.2 mg/dL Final   • Total Protein 10/07/2019 7.6  6.3 - 8.6 g/dL Final   • Albumin 10/07/2019 4.20  3.50 - 5.00 g/dL Final   • ALT (SGPT) 10/07/2019 36  <=50 U/L Final   • AST (SGOT) 10/07/2019 32  17 - 59 U/L Final   • Alkaline Phosphatase 10/07/2019 96  38 - 126 U/L Final   • Total Bilirubin 10/07/2019 0.4  0.2 - 1.3 mg/dL Final   • eGFR   Amer 10/07/2019 82  56 - 130 mL/min/1.73 Final   • Globulin 10/07/2019 3.4  2.3 - 3.5 gm/dL Final   • A/G Ratio 10/07/2019 1.2  1.1 - 1.8 g/dL Final   • BUN/Creatinine Ratio 10/07/2019 17.5  7.0 - 25.0 Final   • Anion Gap 10/07/2019 8.0  5.0 - 15.0 mmol/L Final   • Hemoglobin A1C 10/07/2019 6.20* 4.80 - 5.60 % Final   • PSA 10/07/2019 0.104  0.000 - 4.000 ng/mL Final   • Total Cholesterol 10/07/2019 162  150 - 200 mg/dL Final   • Triglycerides 10/07/2019 112  <=150 mg/dL Final   • HDL Cholesterol  10/07/2019 35* 40 - 59 mg/dL Final   • LDL Cholesterol  10/07/2019 105* <=100 mg/dL Final   • VLDL Cholesterol 10/07/2019 22.4  mg/dL Final   • LDL/HDL Ratio 10/07/2019 2.99  0.00 - 3.55 Final   ]

## 2019-10-14 NOTE — PATIENT INSTRUCTIONS
Exercising to Lose Weight  Exercise is structured, repetitive physical activity to improve fitness and health. Getting regular exercise is important for everyone. It is especially important if you are overweight. Being overweight increases your risk of heart disease, stroke, diabetes, high blood pressure, and several types of cancer. Reducing your calorie intake and exercising can help you lose weight.  Exercise is usually categorized as moderate or vigorous intensity. To lose weight, most people need to do a certain amount of moderate-intensity or vigorous-intensity exercise each week.  Moderate-intensity exercise    Moderate-intensity exercise is any activity that gets you moving enough to burn at least three times more energy (calories) than if you were sitting.  Examples of moderate exercise include:  · Walking a mile in 15 minutes.  · Doing light yard work.  · Biking at an easy pace.  Most people should get at least 150 minutes (2 hours and 30 minutes) a week of moderate-intensity exercise to maintain their body weight.  Vigorous-intensity exercise  Vigorous-intensity exercise is any activity that gets you moving enough to burn at least six times more calories than if you were sitting. When you exercise at this intensity, you should be working hard enough that you are not able to carry on a conversation.  Examples of vigorous exercise include:  · Running.  · Playing a team sport, such as football, basketball, and soccer.  · Jumping rope.  Most people should get at least 75 minutes (1 hour and 15 minutes) a week of vigorous-intensity exercise to maintain their body weight.  How can exercise affect me?  When you exercise enough to burn more calories than you eat, you lose weight. Exercise also reduces body fat and builds muscle. The more muscle you have, the more calories you burn. Exercise also:  · Improves mood.  · Reduces stress and tension.  · Improves your overall fitness, flexibility, and  endurance.  · Increases bone strength.  The amount of exercise you need to lose weight depends on:  · Your age.  · The type of exercise.  · Any health conditions you have.  · Your overall physical ability.  Talk to your health care provider about how much exercise you need and what types of activities are safe for you.  What actions can I take to lose weight?  Nutrition    · Make changes to your diet as told by your health care provider or diet and nutrition specialist (dietitian). This may include:  ? Eating fewer calories.  ? Eating more protein.  ? Eating less unhealthy fats.  ? Eating a diet that includes fresh fruits and vegetables, whole grains, low-fat dairy products, and lean protein.  ? Avoiding foods with added fat, salt, and sugar.  · Drink plenty of water while you exercise to prevent dehydration or heat stroke.  Activity  · Choose an activity that you enjoy and set realistic goals. Your health care provider can help you make an exercise plan that works for you.  · Exercise at a moderate or vigorous intensity most days of the week.  ? The intensity of exercise may vary from person to person. You can tell how intense a workout is for you by paying attention to your breathing and heartbeat. Most people will notice their breathing and heartbeat get faster with more intense exercise.  · Do resistance training twice each week, such as:  ? Push-ups.  ? Sit-ups.  ? Lifting weights.  ? Using resistance bands.  · Getting short amounts of exercise can be just as helpful as long structured periods of exercise. If you have trouble finding time to exercise, try to include exercise in your daily routine.  ? Get up, stretch, and walk around every 30 minutes throughout the day.  ? Go for a walk during your lunch break.  ? Park your car farther away from your destination.  ? If you take public transportation, get off one stop early and walk the rest of the way.  ? Make phone calls while standing up and walking  around.  ? Take the stairs instead of elevators or escalators.  · Wear comfortable clothes and shoes with good support.  · Do not exercise so much that you hurt yourself, feel dizzy, or get very short of breath.  Where to find more information  · [place] Department of Health and Human Services: www.hhs.gov  · Centers for Disease Control and Prevention (CDC): www.cdc.gov  Contact a health care provider:  · Before starting a new exercise program.  · If you have questions or concerns about your weight.  · If you have a medical problem that keeps you from exercising.  Get help right away if you have any of the following while exercising:  · Injury.  · Dizziness.  · Difficulty breathing or shortness of breath that does not go away when you stop exercising.  · Chest pain.  · Rapid heartbeat.  Summary  · Being overweight increases your risk of heart disease, stroke, diabetes, high blood pressure, and several types of cancer.  · Losing weight happens when you burn more calories than you eat.  · Reducing the amount of calories you eat in addition to getting regular moderate or vigorous exercise each week helps you lose weight.  This information is not intended to replace advice given to you by your health care provider. Make sure you discuss any questions you have with your health care provider.  Document Released: 01/20/2012 Document Revised: 12/31/2018 Document Reviewed: 12/31/2018  [employer] Interactive Patient Education © 2019 [employer]      Calorie Counting for Weight Loss  Calories are units of energy. Your body needs a certain amount of calories from food to keep you going throughout the day. When you eat more calories than your body needs, your body stores the extra calories as fat. When you eat fewer calories than your body needs, your body burns fat to get the energy it needs.  Calorie counting means keeping track of how many calories you eat and drink each day. Calorie counting can be helpful if you need to lose  weight. If you make sure to eat fewer calories than your body needs, you should lose weight. Ask your health care provider what a healthy weight is for you.  For calorie counting to work, you will need to eat the right number of calories in a day in order to lose a healthy amount of weight per week. A dietitian can help you determine how many calories you need in a day and will give you suggestions on how to reach your calorie goal.  · A healthy amount of weight to lose per week is usually 1-2 lb (0.5-0.9 kg). This usually means that your daily calorie intake should be reduced by 500-750 calories.  · Eating 1,200 - 1,500 calories per day can help most women lose weight.  · Eating 1,500 - 1,800 calories per day can help most men lose weight.  What is my plan?  My goal is to have __________ calories per day.  If I have this many calories per day, I should lose around __________ pounds per week.  What do I need to know about calorie counting?  In order to meet your daily calorie goal, you will need to:  · Find out how many calories are in each food you would like to eat. Try to do this before you eat.  · Decide how much of the food you plan to eat.  · Write down what you ate and how many calories it had. Doing this is called keeping a food log.  To successfully lose weight, it is important to balance calorie counting with a healthy lifestyle that includes regular activity. Aim for 150 minutes of moderate exercise (such as walking) or 75 minutes of vigorous exercise (such as running) each week.  Where do I find calorie information?    The number of calories in a food can be found on a Nutrition Facts label. If a food does not have a Nutrition Facts label, try to look up the calories online or ask your dietitian for help.  Remember that calories are listed per serving. If you choose to have more than one serving of a food, you will have to multiply the calories per serving by the amount of servings you plan to eat. For  "example, the label on a package of bread might say that a serving size is 1 slice and that there are 90 calories in a serving. If you eat 1 slice, you will have eaten 90 calories. If you eat 2 slices, you will have eaten 180 calories.  How do I keep a food log?  Immediately after each meal, record the following information in your food log:  · What you ate. Don't forget to include toppings, sauces, and other extras on the food.  · How much you ate. This can be measured in cups, ounces, or number of items.  · How many calories each food and drink had.  · The total number of calories in the meal.  Keep your food log near you, such as in a small notebook in your pocket, or use a mobile bird or website. Some programs will calculate calories for you and show you how many calories you have left for the day to meet your goal.  What are some calorie counting tips?    · Use your calories on foods and drinks that will fill you up and not leave you hungry:  ? Some examples of foods that fill you up are nuts and nut butters, vegetables, lean proteins, and high-fiber foods like whole grains. High-fiber foods are foods with more than 5 g fiber per serving.  ? Drinks such as sodas, specialty coffee drinks, alcohol, and juices have a lot of calories, yet do not fill you up.  · Eat nutritious foods and avoid empty calories. Empty calories are calories you get from foods or beverages that do not have many vitamins or protein, such as candy, sweets, and soda. It is better to have a nutritious high-calorie food (such as an avocado) than a food with few nutrients (such as a bag of chips).  · Know how many calories are in the foods you eat most often. This will help you calculate calorie counts faster.  · Pay attention to calories in drinks. Low-calorie drinks include water and unsweetened drinks.  · Pay attention to nutrition labels for \"low fat\" or \"fat free\" foods. These foods sometimes have the same amount of calories or more calories " than the full fat versions. They also often have added sugar, starch, or salt, to make up for flavor that was removed with the fat.  · Find a way of tracking calories that works for you. Get creative. Try different apps or programs if writing down calories does not work for you.  What are some portion control tips?  · Know how many calories are in a serving. This will help you know how many servings of a certain food you can have.  · Use a measuring cup to measure serving sizes. You could also try weighing out portions on a kitchen scale. With time, you will be able to estimate serving sizes for some foods.  · Take some time to put servings of different foods on your favorite plates, bowls, and cups so you know what a serving looks like.  · Try not to eat straight from a bag or box. Doing this can lead to overeating. Put the amount you would like to eat in a cup or on a plate to make sure you are eating the right portion.  · Use smaller plates, glasses, and bowls to prevent overeating.  · Try not to multitask (for example, watch TV or use your computer) while eating. If it is time to eat, sit down at a table and enjoy your food. This will help you to know when you are full. It will also help you to be aware of what you are eating and how much you are eating.  What are tips for following this plan?  Reading food labels  · Check the calorie count compared to the serving size. The serving size may be smaller than what you are used to eating.  · Check the source of the calories. Make sure the food you are eating is high in vitamins and protein and low in saturated and trans fats.  Shopping  · Read nutrition labels while you shop. This will help you make healthy decisions before you decide to purchase your food.  · Make a grocery list and stick to it.  Cooking  · Try to cook your favorite foods in a healthier way. For example, try baking instead of frying.  · Use low-fat dairy products.  Meal planning  · Use more fruits  and vegetables. Half of your plate should be fruits and vegetables.  · Include lean proteins like poultry and fish.  How do I count calories when eating out?  · Ask for smaller portion sizes.  · Consider sharing an entree and sides instead of getting your own entree.  · If you get your own entree, eat only half. Ask for a box at the beginning of your meal and put the rest of your entree in it so you are not tempted to eat it.  · If calories are listed on the menu, choose the lower calorie options.  · Choose dishes that include vegetables, fruits, whole grains, low-fat dairy products, and lean protein.  · Choose items that are boiled, broiled, grilled, or steamed. Stay away from items that are buttered, battered, fried, or served with cream sauce. Items labeled “crispy” are usually fried, unless stated otherwise.  · Choose water, low-fat milk, unsweetened iced tea, or other drinks without added sugar. If you want an alcoholic beverage, choose a lower calorie option such as a glass of wine or light beer.  · Ask for dressings, sauces, and syrups on the side. These are usually high in calories, so you should limit the amount you eat.  · If you want a salad, choose a garden salad and ask for grilled meats. Avoid extra toppings like chisholm, cheese, or fried items. Ask for the dressing on the side, or ask for olive oil and vinegar or lemon to use as dressing.  · Estimate how many servings of a food you are given. For example, a serving of cooked rice is ½ cup or about the size of half a baseball. Knowing serving sizes will help you be aware of how much food you are eating at restaurants. The list below tells you how big or small some common portion sizes are based on everyday objects:  ? 1 oz--4 stacked dice.  ? 3 oz--1 deck of cards.  ? 1 tsp--1 die.  ? 1 Tbsp--½ a ping-pong ball.  ? 2 Tbsp--1 ping-pong ball.  ? ½ cup--½ baseball.  ? 1 cup--1 baseball.  Summary  · Calorie counting means keeping track of how many calories  you eat and drink each day. If you eat fewer calories than your body needs, you should lose weight.  · A healthy amount of weight to lose per week is usually 1-2 lb (0.5-0.9 kg). This usually means reducing your daily calorie intake by 500-750 calories.  · The number of calories in a food can be found on a Nutrition Facts label. If a food does not have a Nutrition Facts label, try to look up the calories online or ask your dietitian for help.  · Use your calories on foods and drinks that will fill you up, and not on foods and drinks that will leave you hungry.  · Use smaller plates, glasses, and bowls to prevent overeating.  This information is not intended to replace advice given to you by your health care provider. Make sure you discuss any questions you have with your health care provider.  Document Released: 12/18/2006 Document Revised: 11/17/2017 Document Reviewed: 11/17/2017  [employer] Interactive Patient Education © 2019 [employer]

## 2019-10-29 RX ORDER — GLIMEPIRIDE 1 MG/1
TABLET ORAL
Qty: 30 TABLET | Refills: 11 | Status: SHIPPED | OUTPATIENT
Start: 2019-10-29 | End: 2021-09-07 | Stop reason: ALTCHOICE

## 2020-07-21 ENCOUNTER — LAB (OUTPATIENT)
Dept: LAB | Facility: OTHER | Age: 55
End: 2020-07-21

## 2020-07-21 DIAGNOSIS — E78.2 MIXED HYPERLIPIDEMIA: Chronic | ICD-10-CM

## 2020-07-21 DIAGNOSIS — E11.9 TYPE 2 DIABETES MELLITUS WITHOUT COMPLICATION, WITHOUT LONG-TERM CURRENT USE OF INSULIN (HCC): Chronic | ICD-10-CM

## 2020-07-21 DIAGNOSIS — I10 ESSENTIAL HYPERTENSION: Chronic | ICD-10-CM

## 2020-07-21 LAB
ALBUMIN SERPL-MCNC: 4.2 G/DL (ref 3.5–5)
ALBUMIN UR-MCNC: <1.2 MG/DL
ALBUMIN/GLOB SERPL: 1.1 G/DL (ref 1.1–1.8)
ALP SERPL-CCNC: 88 U/L (ref 38–126)
ALT SERPL W P-5'-P-CCNC: 56 U/L
ANION GAP SERPL CALCULATED.3IONS-SCNC: 9 MMOL/L (ref 5–15)
AST SERPL-CCNC: 43 U/L (ref 17–59)
BASOPHILS # BLD AUTO: 0.03 10*3/MM3 (ref 0–0.2)
BASOPHILS NFR BLD AUTO: 0.4 % (ref 0–1.5)
BILIRUB SERPL-MCNC: 0.5 MG/DL (ref 0.2–1.3)
BUN SERPL-MCNC: 16 MG/DL (ref 7–23)
BUN/CREAT SERPL: 15.8 (ref 7–25)
CALCIUM SPEC-SCNC: 10.7 MG/DL (ref 8.4–10.2)
CHLORIDE SERPL-SCNC: 101 MMOL/L (ref 101–112)
CHOLEST SERPL-MCNC: 152 MG/DL (ref 150–200)
CO2 SERPL-SCNC: 28 MMOL/L (ref 22–30)
CREAT SERPL-MCNC: 1.01 MG/DL (ref 0.7–1.3)
CREAT UR-MCNC: 118.1 MG/DL
DEPRECATED RDW RBC AUTO: 46.4 FL (ref 37–54)
EOSINOPHIL # BLD AUTO: 0.19 10*3/MM3 (ref 0–0.4)
EOSINOPHIL NFR BLD AUTO: 2.5 % (ref 0.3–6.2)
ERYTHROCYTE [DISTWIDTH] IN BLOOD BY AUTOMATED COUNT: 14 % (ref 12.3–15.4)
GFR SERPL CREATININE-BSD FRML MDRD: 94 ML/MIN/1.73 (ref 56–130)
GLOBULIN UR ELPH-MCNC: 3.7 GM/DL (ref 2.3–3.5)
GLUCOSE SERPL-MCNC: 97 MG/DL (ref 70–99)
HBA1C MFR BLD: 6.5 % (ref 4.8–5.6)
HCT VFR BLD AUTO: 40 % (ref 37.5–51)
HDLC SERPL-MCNC: 29 MG/DL (ref 40–59)
HGB BLD-MCNC: 13.3 G/DL (ref 13–17.7)
LDLC SERPL CALC-MCNC: 98 MG/DL
LDLC/HDLC SERPL: 3.38 {RATIO} (ref 0–3.55)
LYMPHOCYTES # BLD AUTO: 3.33 10*3/MM3 (ref 0.7–3.1)
LYMPHOCYTES NFR BLD AUTO: 44.6 % (ref 19.6–45.3)
MCH RBC QN AUTO: 30.6 PG (ref 26.6–33)
MCHC RBC AUTO-ENTMCNC: 33.3 G/DL (ref 31.5–35.7)
MCV RBC AUTO: 92 FL (ref 79–97)
MICROALBUMIN/CREAT UR: NORMAL MG/G{CREAT}
MONOCYTES # BLD AUTO: 0.94 10*3/MM3 (ref 0.1–0.9)
MONOCYTES NFR BLD AUTO: 12.6 % (ref 5–12)
NEUTROPHILS NFR BLD AUTO: 2.97 10*3/MM3 (ref 1.7–7)
NEUTROPHILS NFR BLD AUTO: 39.9 % (ref 42.7–76)
PLATELET # BLD AUTO: 273 10*3/MM3 (ref 140–450)
PMV BLD AUTO: 8.5 FL (ref 6–12)
POTASSIUM SERPL-SCNC: 4.3 MMOL/L (ref 3.4–5)
PROT SERPL-MCNC: 7.9 G/DL (ref 6.3–8.6)
RBC # BLD AUTO: 4.35 10*6/MM3 (ref 4.14–5.8)
SODIUM SERPL-SCNC: 138 MMOL/L (ref 137–145)
TRIGL SERPL-MCNC: 125 MG/DL
TSH SERPL DL<=0.05 MIU/L-ACNC: 1.49 UIU/ML (ref 0.27–4.2)
VLDLC SERPL-MCNC: 25 MG/DL
WBC # BLD AUTO: 7.46 10*3/MM3 (ref 3.4–10.8)

## 2020-07-21 PROCEDURE — 82043 UR ALBUMIN QUANTITATIVE: CPT | Performed by: INTERNAL MEDICINE

## 2020-07-21 PROCEDURE — 80061 LIPID PANEL: CPT | Performed by: INTERNAL MEDICINE

## 2020-07-21 PROCEDURE — 36415 COLL VENOUS BLD VENIPUNCTURE: CPT | Performed by: INTERNAL MEDICINE

## 2020-07-21 PROCEDURE — 85025 COMPLETE CBC W/AUTO DIFF WBC: CPT | Performed by: INTERNAL MEDICINE

## 2020-07-21 PROCEDURE — 80053 COMPREHEN METABOLIC PANEL: CPT | Performed by: INTERNAL MEDICINE

## 2020-07-21 PROCEDURE — 82570 ASSAY OF URINE CREATININE: CPT | Performed by: INTERNAL MEDICINE

## 2020-07-21 PROCEDURE — 83036 HEMOGLOBIN GLYCOSYLATED A1C: CPT | Performed by: INTERNAL MEDICINE

## 2020-07-21 PROCEDURE — 84443 ASSAY THYROID STIM HORMONE: CPT | Performed by: INTERNAL MEDICINE

## 2020-07-28 ENCOUNTER — OFFICE VISIT (OUTPATIENT)
Dept: FAMILY MEDICINE CLINIC | Facility: CLINIC | Age: 55
End: 2020-07-28

## 2020-07-28 VITALS
HEART RATE: 102 BPM | BODY MASS INDEX: 32.74 KG/M2 | SYSTOLIC BLOOD PRESSURE: 127 MMHG | WEIGHT: 283 LBS | HEIGHT: 78 IN | DIASTOLIC BLOOD PRESSURE: 71 MMHG

## 2020-07-28 DIAGNOSIS — E83.52 HYPERCALCEMIA: ICD-10-CM

## 2020-07-28 DIAGNOSIS — I10 ESSENTIAL HYPERTENSION: Chronic | ICD-10-CM

## 2020-07-28 DIAGNOSIS — I48.0 PAROXYSMAL ATRIAL FIBRILLATION (HCC): Chronic | ICD-10-CM

## 2020-07-28 DIAGNOSIS — G47.33 OBSTRUCTIVE SLEEP APNEA SYNDROME: Chronic | ICD-10-CM

## 2020-07-28 DIAGNOSIS — E78.2 MIXED HYPERLIPIDEMIA: Chronic | ICD-10-CM

## 2020-07-28 DIAGNOSIS — Z12.5 SPECIAL SCREENING FOR MALIGNANT NEOPLASM OF PROSTATE: ICD-10-CM

## 2020-07-28 DIAGNOSIS — K21.9 GASTROESOPHAGEAL REFLUX DISEASE WITHOUT ESOPHAGITIS: Chronic | ICD-10-CM

## 2020-07-28 DIAGNOSIS — F17.200 TOBACCO DEPENDENCE SYNDROME: Chronic | ICD-10-CM

## 2020-07-28 DIAGNOSIS — E11.9 TYPE 2 DIABETES MELLITUS WITHOUT COMPLICATION, WITHOUT LONG-TERM CURRENT USE OF INSULIN (HCC): Primary | Chronic | ICD-10-CM

## 2020-07-28 PROCEDURE — 99443 PR PHYS/QHP TELEPHONE EVALUATION 21-30 MIN: CPT | Performed by: INTERNAL MEDICINE

## 2020-07-28 NOTE — PROGRESS NOTES
Subjective      You have chosen to receive care through a telephone visit. Do you consent to use a telephone visit for your medical care today? Yes  Total visit time: 21 minutes.        History of Present Illness     Praveen Noonan is a 55 y.o. male who receives care today via telephone visit for overdue 6-month follow up on hyperlipidemia, hypertension, tobacco dependence syndrome, GERD, and Type 2 diabetes mellitus.  He continues to work at [employer].  He continues to practice social distancing and wear a face mask to help decrease exposure to Covid-19.  Patient is higher risk with his obesity, tobacco use, hypertension, and diabetes.   He wears CPAP nightly to treat sleep apnea.  GERD symptoms adequately managed with Prilosec 40 mg daily.     Diabetes has slightly progressed, but remains acceptable level.  Patient was diagnosed with new onset type 2 diabetes fall 2018 with A1c 12.6.   I started him on metformin and Amaryl and made gradual adjustments in his diabetes regimen with significant improvement in A1c decreasing from 12.6 to 6.2.  Since that time, he has been able to maintain A1c in the 6-6.5 range.  A1c is 6.5 only slightly increased from 6.2 with labs last fall.      Unfortunately, he continues to smoke approximately one pack of cigarettes daily.  I advised the patient of the risks in continuing to use tobacco products.  Patient is urged to stop smoking and never start again. I sent a prescription for Chantix in the past, but he was concerned with possible side effects.      Labs reveal mild hypercalcemia.  He denies a lot of dietary calcium.  He denies kidney stones.  It may be due to his diuretic.  We will continue to monitor.     Patient had colonoscopy by Dr. Diehl 07/26/2019 revealing one small serrated adenomatous polyp in the sigmoid colon and many diverticula with repeat recommended in three years making him due 07/2022.     The patient's relevant past medical, surgical, and social  "history was reviewed in Epic.   Lab results are reviewed with the patient today.  CBC unremarkable.  Fasting glucose 97.  Normal renal function.  ALT elevated at 56 increased from 36.  AST 43 increased from 32.  Total cholesterol 152. HDL 52.  LDL 98.  Triglycerides 125.  LDL/HDL ratio 3.38.          Review of Systems   Constitutional: Negative for chills, fatigue and fever.   HENT: Negative for congestion, ear pain, postnasal drip, sinus pressure and sore throat.    Respiratory: Negative for cough, shortness of breath and wheezing.    Cardiovascular: Negative for chest pain, palpitations and leg swelling.   Gastrointestinal: Negative for abdominal pain, blood in stool, constipation, diarrhea, nausea and vomiting.   Endocrine: Negative for cold intolerance, heat intolerance, polydipsia and polyuria.   Genitourinary: Negative for dysuria, frequency, hematuria and urgency.   Skin: Negative for rash.   Neurological: Negative for syncope and weakness.        Objective     Visit Vitals  /71   Pulse 102   Ht 198.1 cm (78\")   Wt 128 kg (283 lb)   BMI 32.70 kg/m²     Physical Exam        Assessment/Plan      We will continue to monitor mild hypercalcemia.  It may be due to diuretic.  Check a PTH with next set of labs.    Patient is encouraged to continue social distancing and precautions to decrease risk of exposure to Covid-19.      Continue using CPAP to treat sleep apnea.     Continue Protonix for GERD.    We will continue to monitor the mild elevation in LFTs.    Continue diltiazem CD, Avapro, and Aldactazide.  Pursue sodium restriction and aggressive weight loss.     Continue with metformin and Amaryl to manage diabetes.  Intensify diabetic diet, exercise and weight loss efforts.     I advised the patient of the risks in continuing to use tobacco products.  Patient is urged to stop smoking and never start again. Cessation aids are offered.   He is not currently motivated to pursue cessation efforts, but will " notify us if he changes his mind.       Continue follow-up appointments with Dr. Hillman, cardiology and electrophysiologist.  Continue the Eliquis.  Continue 10 mg of Lipitor daily and dietary efforts.      Return in six months for follow up with fasting labs one week prior.     Scribed for Dr. Angel by Vandana Gomes [facility].     Diagnoses and all orders for this visit:    Type 2 diabetes mellitus without complication, without long-term current use of insulin (CMS/[facility]) - diagnosed 9/2018  -     CBC Auto Differential; Future  -     Comprehensive Metabolic Panel; Future  -     Hemoglobin A1c; Future    Essential hypertension  -     Comprehensive Metabolic Panel; Future    Obstructive sleep apnea syndrome - followed by Dr. Gil    Paroxysmal atrial fibrillation (CMS/[facility])    Mixed hyperlipidemia  -     Lipid Panel; Future    Tobacco dependence syndrome - 1ppd    Gastroesophageal reflux disease without esophagitis    Hypercalcemia  -     Comprehensive Metabolic Panel; Future  -     PTH, Intact; Future    Special screening for malignant neoplasm of prostate  -     PSA Screen; Future        Lab on 07/21/2020   Component Date Value Ref Range Status   • WBC 07/21/2020 7.46  3.40 - 10.80 10*3/mm3 Final   • RBC 07/21/2020 4.35  4.14 - 5.80 10*6/mm3 Final   • Hemoglobin 07/21/2020 13.3  13.0 - 17.7 g/dL Final   • Hematocrit 07/21/2020 40.0  37.5 - 51.0 % Final   • MCV 07/21/2020 92.0  79.0 - 97.0 fL Final   • MCH 07/21/2020 30.6  26.6 - 33.0 pg Final   • MCHC 07/21/2020 33.3  31.5 - 35.7 g/dL Final   • RDW 07/21/2020 14.0  12.3 - 15.4 % Final   • RDW-SD 07/21/2020 46.4  37.0 - 54.0 fl Final   • MPV 07/21/2020 8.5  6.0 - 12.0 fL Final   • Platelets 07/21/2020 273  140 - 450 10*3/mm3 Final   • Neutrophil % 07/21/2020 39.9* 42.7 - 76.0 % Final   • Lymphocyte % 07/21/2020 44.6  19.6 - 45.3 % Final   • Monocyte % 07/21/2020 12.6* 5.0 - 12.0 % Final   • Eosinophil % 07/21/2020 2.5  0.3 - 6.2 % Final   • Basophil % 07/21/2020  0.4  0.0 - 1.5 % Final   • Neutrophils, Absolute 07/21/2020 2.97  1.70 - 7.00 10*3/mm3 Final   • Lymphocytes, Absolute 07/21/2020 3.33* 0.70 - 3.10 10*3/mm3 Final   • Monocytes, Absolute 07/21/2020 0.94* 0.10 - 0.90 10*3/mm3 Final   • Eosinophils, Absolute 07/21/2020 0.19  0.00 - 0.40 10*3/mm3 Final   • Basophils, Absolute 07/21/2020 0.03  0.00 - 0.20 10*3/mm3 Final   • Glucose 07/21/2020 97  70 - 99 mg/dL Final   • BUN 07/21/2020 16  7 - 23 mg/dL Final   • Creatinine 07/21/2020 1.01  0.70 - 1.30 mg/dL Final   • Sodium 07/21/2020 138  137 - 145 mmol/L Final   • Potassium 07/21/2020 4.3  3.4 - 5.0 mmol/L Final   • Chloride 07/21/2020 101  101 - 112 mmol/L Final   • CO2 07/21/2020 28.0  22.0 - 30.0 mmol/L Final   • Calcium 07/21/2020 10.7* 8.4 - 10.2 mg/dL Final   • Total Protein 07/21/2020 7.9  6.3 - 8.6 g/dL Final   • Albumin 07/21/2020 4.20  3.50 - 5.00 g/dL Final   • ALT (SGPT) 07/21/2020 56* <=50 U/L Final   • AST (SGOT) 07/21/2020 43  17 - 59 U/L Final   • Alkaline Phosphatase 07/21/2020 88  38 - 126 U/L Final   • Total Bilirubin 07/21/2020 0.5  0.2 - 1.3 mg/dL Final   • eGFR  African Amer 07/21/2020 94  56 - 130 mL/min/1.73 Final   • Globulin 07/21/2020 3.7* 2.3 - 3.5 gm/dL Final   • A/G Ratio 07/21/2020 1.1  1.1 - 1.8 g/dL Final   • BUN/Creatinine Ratio 07/21/2020 15.8  7.0 - 25.0 Final   • Anion Gap 07/21/2020 9.0  5.0 - 15.0 mmol/L Final   • Hemoglobin A1C 07/21/2020 6.50* 4.80 - 5.60 % Final   • Total Cholesterol 07/21/2020 152  150 - 200 mg/dL Final   • Triglycerides 07/21/2020 125  <=150 mg/dL Final   • HDL Cholesterol 07/21/2020 29* 40 - 59 mg/dL Final   • LDL Cholesterol  07/21/2020 98  <=100 mg/dL Final   • VLDL Cholesterol 07/21/2020 25  mg/dL Final   • LDL/HDL Ratio 07/21/2020 3.38  0.00 - 3.55 Final   • Microalbumin/Creatinine Ratio 07/21/2020    Final    Unable to calculate   • Creatinine, Urine 07/21/2020 118.1  mg/dL Final   • Microalbumin, Urine 07/21/2020 <1.2  mg/dL Final   • TSH 07/21/2020  1.490  0.270 - 4.200 uIU/mL Final   ]

## 2020-07-28 NOTE — PATIENT INSTRUCTIONS
Exercising to Lose Weight  Exercise is structured, repetitive physical activity to improve fitness and health. Getting regular exercise is important for everyone. It is especially important if you are overweight. Being overweight increases your risk of heart disease, stroke, diabetes, high blood pressure, and several types of cancer. Reducing your calorie intake and exercising can help you lose weight.  Exercise is usually categorized as moderate or vigorous intensity. To lose weight, most people need to do a certain amount of moderate-intensity or vigorous-intensity exercise each week.  Moderate-intensity exercise    Moderate-intensity exercise is any activity that gets you moving enough to burn at least three times more energy (calories) than if you were sitting.  Examples of moderate exercise include:  · Walking a mile in 15 minutes.  · Doing light yard work.  · Biking at an easy pace.  Most people should get at least 150 minutes (2 hours and 30 minutes) a week of moderate-intensity exercise to maintain their body weight.  Vigorous-intensity exercise  Vigorous-intensity exercise is any activity that gets you moving enough to burn at least six times more calories than if you were sitting. When you exercise at this intensity, you should be working hard enough that you are not able to carry on a conversation.  Examples of vigorous exercise include:  · Running.  · Playing a team sport, such as football, basketball, and soccer.  · Jumping rope.  Most people should get at least 75 minutes (1 hour and 15 minutes) a week of vigorous-intensity exercise to maintain their body weight.  How can exercise affect me?  When you exercise enough to burn more calories than you eat, you lose weight. Exercise also reduces body fat and builds muscle. The more muscle you have, the more calories you burn. Exercise also:  · Improves mood.  · Reduces stress and tension.  · Improves your overall fitness, flexibility, and  endurance.  · Increases bone strength.  The amount of exercise you need to lose weight depends on:  · Your age.  · The type of exercise.  · Any health conditions you have.  · Your overall physical ability.  Talk to your health care provider about how much exercise you need and what types of activities are safe for you.  What actions can I take to lose weight?  Nutrition    · Make changes to your diet as told by your health care provider or diet and nutrition specialist (dietitian). This may include:  ? Eating fewer calories.  ? Eating more protein.  ? Eating less unhealthy fats.  ? Eating a diet that includes fresh fruits and vegetables, whole grains, low-fat dairy products, and lean protein.  ? Avoiding foods with added fat, salt, and sugar.  · Drink plenty of water while you exercise to prevent dehydration or heat stroke.  Activity  · Choose an activity that you enjoy and set realistic goals. Your health care provider can help you make an exercise plan that works for you.  · Exercise at a moderate or vigorous intensity most days of the week.  ? The intensity of exercise may vary from person to person. You can tell how intense a workout is for you by paying attention to your breathing and heartbeat. Most people will notice their breathing and heartbeat get faster with more intense exercise.  · Do resistance training twice each week, such as:  ? Push-ups.  ? Sit-ups.  ? Lifting weights.  ? Using resistance bands.  · Getting short amounts of exercise can be just as helpful as long structured periods of exercise. If you have trouble finding time to exercise, try to include exercise in your daily routine.  ? Get up, stretch, and walk around every 30 minutes throughout the day.  ? Go for a walk during your lunch break.  ? Park your car farther away from your destination.  ? If you take public transportation, get off one stop early and walk the rest of the way.  ? Make phone calls while standing up and walking  around.  ? Take the stairs instead of elevators or escalators.  · Wear comfortable clothes and shoes with good support.  · Do not exercise so much that you hurt yourself, feel dizzy, or get very short of breath.  Where to find more information  · [place] Department of Health and Human Services: www.hhs.gov  · Centers for Disease Control and Prevention (CDC): www.cdc.gov  Contact a health care provider:  · Before starting a new exercise program.  · If you have questions or concerns about your weight.  · If you have a medical problem that keeps you from exercising.  Get help right away if you have any of the following while exercising:  · Injury.  · Dizziness.  · Difficulty breathing or shortness of breath that does not go away when you stop exercising.  · Chest pain.  · Rapid heartbeat.  Summary  · Being overweight increases your risk of heart disease, stroke, diabetes, high blood pressure, and several types of cancer.  · Losing weight happens when you burn more calories than you eat.  · Reducing the amount of calories you eat in addition to getting regular moderate or vigorous exercise each week helps you lose weight.  This information is not intended to replace advice given to you by your health care provider. Make sure you discuss any questions you have with your health care provider.  Document Released: 01/20/2012 Document Revised: 12/31/2018 Document Reviewed: 12/31/2018  [employer] Patient Education © 2020 [employer]      Calorie Counting for Weight Loss  Calories are units of energy. Your body needs a certain amount of calories from food to keep you going throughout the day. When you eat more calories than your body needs, your body stores the extra calories as fat. When you eat fewer calories than your body needs, your body burns fat to get the energy it needs.  Calorie counting means keeping track of how many calories you eat and drink each day. Calorie counting can be helpful if you need to lose weight. If  you make sure to eat fewer calories than your body needs, you should lose weight. Ask your health care provider what a healthy weight is for you.  For calorie counting to work, you will need to eat the right number of calories in a day in order to lose a healthy amount of weight per week. A dietitian can help you determine how many calories you need in a day and will give you suggestions on how to reach your calorie goal.  · A healthy amount of weight to lose per week is usually 1-2 lb (0.5-0.9 kg). This usually means that your daily calorie intake should be reduced by 500-750 calories.  · Eating 1,200 - 1,500 calories per day can help most women lose weight.  · Eating 1,500 - 1,800 calories per day can help most men lose weight.  What is my plan?  My goal is to have __________ calories per day.  If I have this many calories per day, I should lose around __________ pounds per week.  What do I need to know about calorie counting?  In order to meet your daily calorie goal, you will need to:  · Find out how many calories are in each food you would like to eat. Try to do this before you eat.  · Decide how much of the food you plan to eat.  · Write down what you ate and how many calories it had. Doing this is called keeping a food log.  To successfully lose weight, it is important to balance calorie counting with a healthy lifestyle that includes regular activity. Aim for 150 minutes of moderate exercise (such as walking) or 75 minutes of vigorous exercise (such as running) each week.  Where do I find calorie information?    The number of calories in a food can be found on a Nutrition Facts label. If a food does not have a Nutrition Facts label, try to look up the calories online or ask your dietitian for help.  Remember that calories are listed per serving. If you choose to have more than one serving of a food, you will have to multiply the calories per serving by the amount of servings you plan to eat. For example, the  "label on a package of bread might say that a serving size is 1 slice and that there are 90 calories in a serving. If you eat 1 slice, you will have eaten 90 calories. If you eat 2 slices, you will have eaten 180 calories.  How do I keep a food log?  Immediately after each meal, record the following information in your food log:  · What you ate. Don't forget to include toppings, sauces, and other extras on the food.  · How much you ate. This can be measured in cups, ounces, or number of items.  · How many calories each food and drink had.  · The total number of calories in the meal.  Keep your food log near you, such as in a small notebook in your pocket, or use a mobile bird or website. Some programs will calculate calories for you and show you how many calories you have left for the day to meet your goal.  What are some calorie counting tips?    · Use your calories on foods and drinks that will fill you up and not leave you hungry:  ? Some examples of foods that fill you up are nuts and nut butters, vegetables, lean proteins, and high-fiber foods like whole grains. High-fiber foods are foods with more than 5 g fiber per serving.  ? Drinks such as sodas, specialty coffee drinks, alcohol, and juices have a lot of calories, yet do not fill you up.  · Eat nutritious foods and avoid empty calories. Empty calories are calories you get from foods or beverages that do not have many vitamins or protein, such as candy, sweets, and soda. It is better to have a nutritious high-calorie food (such as an avocado) than a food with few nutrients (such as a bag of chips).  · Know how many calories are in the foods you eat most often. This will help you calculate calorie counts faster.  · Pay attention to calories in drinks. Low-calorie drinks include water and unsweetened drinks.  · Pay attention to nutrition labels for \"low fat\" or \"fat free\" foods. These foods sometimes have the same amount of calories or more calories than the " full fat versions. They also often have added sugar, starch, or salt, to make up for flavor that was removed with the fat.  · Find a way of tracking calories that works for you. Get creative. Try different apps or programs if writing down calories does not work for you.  What are some portion control tips?  · Know how many calories are in a serving. This will help you know how many servings of a certain food you can have.  · Use a measuring cup to measure serving sizes. You could also try weighing out portions on a kitchen scale. With time, you will be able to estimate serving sizes for some foods.  · Take some time to put servings of different foods on your favorite plates, bowls, and cups so you know what a serving looks like.  · Try not to eat straight from a bag or box. Doing this can lead to overeating. Put the amount you would like to eat in a cup or on a plate to make sure you are eating the right portion.  · Use smaller plates, glasses, and bowls to prevent overeating.  · Try not to multitask (for example, watch TV or use your computer) while eating. If it is time to eat, sit down at a table and enjoy your food. This will help you to know when you are full. It will also help you to be aware of what you are eating and how much you are eating.  What are tips for following this plan?  Reading food labels  · Check the calorie count compared to the serving size. The serving size may be smaller than what you are used to eating.  · Check the source of the calories. Make sure the food you are eating is high in vitamins and protein and low in saturated and trans fats.  Shopping  · Read nutrition labels while you shop. This will help you make healthy decisions before you decide to purchase your food.  · Make a grocery list and stick to it.  Cooking  · Try to cook your favorite foods in a healthier way. For example, try baking instead of frying.  · Use low-fat dairy products.  Meal planning  · Use more fruits and  "vegetables. Half of your plate should be fruits and vegetables.  · Include lean proteins like poultry and fish.  How do I count calories when eating out?  · Ask for smaller portion sizes.  · Consider sharing an entree and sides instead of getting your own entree.  · If you get your own entree, eat only half. Ask for a box at the beginning of your meal and put the rest of your entree in it so you are not tempted to eat it.  · If calories are listed on the menu, choose the lower calorie options.  · Choose dishes that include vegetables, fruits, whole grains, low-fat dairy products, and lean protein.  · Choose items that are boiled, broiled, grilled, or steamed. Stay away from items that are buttered, battered, fried, or served with cream sauce. Items labeled \"crispy\" are usually fried, unless stated otherwise.  · Choose water, low-fat milk, unsweetened iced tea, or other drinks without added sugar. If you want an alcoholic beverage, choose a lower calorie option such as a glass of wine or light beer.  · Ask for dressings, sauces, and syrups on the side. These are usually high in calories, so you should limit the amount you eat.  · If you want a salad, choose a garden salad and ask for grilled meats. Avoid extra toppings like chisholm, cheese, or fried items. Ask for the dressing on the side, or ask for olive oil and vinegar or lemon to use as dressing.  · Estimate how many servings of a food you are given. For example, a serving of cooked rice is ½ cup or about the size of half a baseball. Knowing serving sizes will help you be aware of how much food you are eating at restaurants. The list below tells you how big or small some common portion sizes are based on everyday objects:  ? 1 oz--4 stacked dice.  ? 3 oz--1 deck of cards.  ? 1 tsp--1 die.  ? 1 Tbsp--½ a ping-pong ball.  ? 2 Tbsp--1 ping-pong ball.  ? ½ cup--½ baseball.  ? 1 cup--1 baseball.  Summary  · Calorie counting means keeping track of how many calories you " eat and drink each day. If you eat fewer calories than your body needs, you should lose weight.  · A healthy amount of weight to lose per week is usually 1-2 lb (0.5-0.9 kg). This usually means reducing your daily calorie intake by 500-750 calories.  · The number of calories in a food can be found on a Nutrition Facts label. If a food does not have a Nutrition Facts label, try to look up the calories online or ask your dietitian for help.  · Use your calories on foods and drinks that will fill you up, and not on foods and drinks that will leave you hungry.  · Use smaller plates, glasses, and bowls to prevent overeating.  This information is not intended to replace advice given to you by your health care provider. Make sure you discuss any questions you have with your health care provider.  Document Released: 12/18/2006 Document Revised: 09/06/2019 Document Reviewed: 11/17/2017  [employer] Patient Education © 2020 [employer]      Exercising to Lose Weight  Exercise is structured, repetitive physical activity to improve fitness and health. Getting regular exercise is important for everyone. It is especially important if you are overweight. Being overweight increases your risk of heart disease, stroke, diabetes, high blood pressure, and several types of cancer. Reducing your calorie intake and exercising can help you lose weight.  Exercise is usually categorized as moderate or vigorous intensity. To lose weight, most people need to do a certain amount of moderate-intensity or vigorous-intensity exercise each week.  Moderate-intensity exercise    Moderate-intensity exercise is any activity that gets you moving enough to burn at least three times more energy (calories) than if you were sitting.  Examples of moderate exercise include:  · Walking a mile in 15 minutes.  · Doing light yard work.  · Biking at an easy pace.  Most people should get at least 150 minutes (2 hours and 30 minutes) a week of moderate-intensity  exercise to maintain their body weight.  Vigorous-intensity exercise  Vigorous-intensity exercise is any activity that gets you moving enough to burn at least six times more calories than if you were sitting. When you exercise at this intensity, you should be working hard enough that you are not able to carry on a conversation.  Examples of vigorous exercise include:  · Running.  · Playing a team sport, such as football, basketball, and soccer.  · Jumping rope.  Most people should get at least 75 minutes (1 hour and 15 minutes) a week of vigorous-intensity exercise to maintain their body weight.  How can exercise affect me?  When you exercise enough to burn more calories than you eat, you lose weight. Exercise also reduces body fat and builds muscle. The more muscle you have, the more calories you burn. Exercise also:  · Improves mood.  · Reduces stress and tension.  · Improves your overall fitness, flexibility, and endurance.  · Increases bone strength.  The amount of exercise you need to lose weight depends on:  · Your age.  · The type of exercise.  · Any health conditions you have.  · Your overall physical ability.  Talk to your health care provider about how much exercise you need and what types of activities are safe for you.  What actions can I take to lose weight?  Nutrition    · Make changes to your diet as told by your health care provider or diet and nutrition specialist (dietitian). This may include:  ? Eating fewer calories.  ? Eating more protein.  ? Eating less unhealthy fats.  ? Eating a diet that includes fresh fruits and vegetables, whole grains, low-fat dairy products, and lean protein.  ? Avoiding foods with added fat, salt, and sugar.  · Drink plenty of water while you exercise to prevent dehydration or heat stroke.  Activity  · Choose an activity that you enjoy and set realistic goals. Your health care provider can help you make an exercise plan that works for you.  · Exercise at a moderate or  vigorous intensity most days of the week.  ? The intensity of exercise may vary from person to person. You can tell how intense a workout is for you by paying attention to your breathing and heartbeat. Most people will notice their breathing and heartbeat get faster with more intense exercise.  · Do resistance training twice each week, such as:  ? Push-ups.  ? Sit-ups.  ? Lifting weights.  ? Using resistance bands.  · Getting short amounts of exercise can be just as helpful as long structured periods of exercise. If you have trouble finding time to exercise, try to include exercise in your daily routine.  ? Get up, stretch, and walk around every 30 minutes throughout the day.  ? Go for a walk during your lunch break.  ? Park your car farther away from your destination.  ? If you take public transportation, get off one stop early and walk the rest of the way.  ? Make phone calls while standing up and walking around.  ? Take the stairs instead of elevators or escalators.  · Wear comfortable clothes and shoes with good support.  · Do not exercise so much that you hurt yourself, feel dizzy, or get very short of breath.  Where to find more information  · [place] Department of Health and Human Services: www.hhs.gov  · Centers for Disease Control and Prevention (CDC): www.cdc.gov  Contact a health care provider:  · Before starting a new exercise program.  · If you have questions or concerns about your weight.  · If you have a medical problem that keeps you from exercising.  Get help right away if you have any of the following while exercising:  · Injury.  · Dizziness.  · Difficulty breathing or shortness of breath that does not go away when you stop exercising.  · Chest pain.  · Rapid heartbeat.  Summary  · Being overweight increases your risk of heart disease, stroke, diabetes, high blood pressure, and several types of cancer.  · Losing weight happens when you burn more calories than you eat.  · Reducing the amount of  calories you eat in addition to getting regular moderate or vigorous exercise each week helps you lose weight.  This information is not intended to replace advice given to you by your health care provider. Make sure you discuss any questions you have with your health care provider.  Document Released: 01/20/2012 Document Revised: 12/31/2018 Document Reviewed: 12/31/2018  [employer] Patient Education © 2020 [employer]      Calorie Counting for Weight Loss  Calories are units of energy. Your body needs a certain amount of calories from food to keep you going throughout the day. When you eat more calories than your body needs, your body stores the extra calories as fat. When you eat fewer calories than your body needs, your body burns fat to get the energy it needs.  Calorie counting means keeping track of how many calories you eat and drink each day. Calorie counting can be helpful if you need to lose weight. If you make sure to eat fewer calories than your body needs, you should lose weight. Ask your health care provider what a healthy weight is for you.  For calorie counting to work, you will need to eat the right number of calories in a day in order to lose a healthy amount of weight per week. A dietitian can help you determine how many calories you need in a day and will give you suggestions on how to reach your calorie goal.  · A healthy amount of weight to lose per week is usually 1-2 lb (0.5-0.9 kg). This usually means that your daily calorie intake should be reduced by 500-750 calories.  · Eating 1,200 - 1,500 calories per day can help most women lose weight.  · Eating 1,500 - 1,800 calories per day can help most men lose weight.  What is my plan?  My goal is to have __________ calories per day.  If I have this many calories per day, I should lose around __________ pounds per week.  What do I need to know about calorie counting?  In order to meet your daily calorie goal, you will need to:  · Find out how many  calories are in each food you would like to eat. Try to do this before you eat.  · Decide how much of the food you plan to eat.  · Write down what you ate and how many calories it had. Doing this is called keeping a food log.  To successfully lose weight, it is important to balance calorie counting with a healthy lifestyle that includes regular activity. Aim for 150 minutes of moderate exercise (such as walking) or 75 minutes of vigorous exercise (such as running) each week.  Where do I find calorie information?    The number of calories in a food can be found on a Nutrition Facts label. If a food does not have a Nutrition Facts label, try to look up the calories online or ask your dietitian for help.  Remember that calories are listed per serving. If you choose to have more than one serving of a food, you will have to multiply the calories per serving by the amount of servings you plan to eat. For example, the label on a package of bread might say that a serving size is 1 slice and that there are 90 calories in a serving. If you eat 1 slice, you will have eaten 90 calories. If you eat 2 slices, you will have eaten 180 calories.  How do I keep a food log?  Immediately after each meal, record the following information in your food log:  · What you ate. Don't forget to include toppings, sauces, and other extras on the food.  · How much you ate. This can be measured in cups, ounces, or number of items.  · How many calories each food and drink had.  · The total number of calories in the meal.  Keep your food log near you, such as in a small notebook in your pocket, or use a mobile bird or website. Some programs will calculate calories for you and show you how many calories you have left for the day to meet your goal.  What are some calorie counting tips?    · Use your calories on foods and drinks that will fill you up and not leave you hungry:  ? Some examples of foods that fill you up are nuts and nut butters,  "vegetables, lean proteins, and high-fiber foods like whole grains. High-fiber foods are foods with more than 5 g fiber per serving.  ? Drinks such as sodas, specialty coffee drinks, alcohol, and juices have a lot of calories, yet do not fill you up.  · Eat nutritious foods and avoid empty calories. Empty calories are calories you get from foods or beverages that do not have many vitamins or protein, such as candy, sweets, and soda. It is better to have a nutritious high-calorie food (such as an avocado) than a food with few nutrients (such as a bag of chips).  · Know how many calories are in the foods you eat most often. This will help you calculate calorie counts faster.  · Pay attention to calories in drinks. Low-calorie drinks include water and unsweetened drinks.  · Pay attention to nutrition labels for \"low fat\" or \"fat free\" foods. These foods sometimes have the same amount of calories or more calories than the full fat versions. They also often have added sugar, starch, or salt, to make up for flavor that was removed with the fat.  · Find a way of tracking calories that works for you. Get creative. Try different apps or programs if writing down calories does not work for you.  What are some portion control tips?  · Know how many calories are in a serving. This will help you know how many servings of a certain food you can have.  · Use a measuring cup to measure serving sizes. You could also try weighing out portions on a kitchen scale. With time, you will be able to estimate serving sizes for some foods.  · Take some time to put servings of different foods on your favorite plates, bowls, and cups so you know what a serving looks like.  · Try not to eat straight from a bag or box. Doing this can lead to overeating. Put the amount you would like to eat in a cup or on a plate to make sure you are eating the right portion.  · Use smaller plates, glasses, and bowls to prevent overeating.  · Try not to multitask " "(for example, watch TV or use your computer) while eating. If it is time to eat, sit down at a table and enjoy your food. This will help you to know when you are full. It will also help you to be aware of what you are eating and how much you are eating.  What are tips for following this plan?  Reading food labels  · Check the calorie count compared to the serving size. The serving size may be smaller than what you are used to eating.  · Check the source of the calories. Make sure the food you are eating is high in vitamins and protein and low in saturated and trans fats.  Shopping  · Read nutrition labels while you shop. This will help you make healthy decisions before you decide to purchase your food.  · Make a grocery list and stick to it.  Cooking  · Try to cook your favorite foods in a healthier way. For example, try baking instead of frying.  · Use low-fat dairy products.  Meal planning  · Use more fruits and vegetables. Half of your plate should be fruits and vegetables.  · Include lean proteins like poultry and fish.  How do I count calories when eating out?  · Ask for smaller portion sizes.  · Consider sharing an entree and sides instead of getting your own entree.  · If you get your own entree, eat only half. Ask for a box at the beginning of your meal and put the rest of your entree in it so you are not tempted to eat it.  · If calories are listed on the menu, choose the lower calorie options.  · Choose dishes that include vegetables, fruits, whole grains, low-fat dairy products, and lean protein.  · Choose items that are boiled, broiled, grilled, or steamed. Stay away from items that are buttered, battered, fried, or served with cream sauce. Items labeled \"crispy\" are usually fried, unless stated otherwise.  · Choose water, low-fat milk, unsweetened iced tea, or other drinks without added sugar. If you want an alcoholic beverage, choose a lower calorie option such as a glass of wine or light beer.  · Ask " for dressings, sauces, and syrups on the side. These are usually high in calories, so you should limit the amount you eat.  · If you want a salad, choose a garden salad and ask for grilled meats. Avoid extra toppings like chisholm, cheese, or fried items. Ask for the dressing on the side, or ask for olive oil and vinegar or lemon to use as dressing.  · Estimate how many servings of a food you are given. For example, a serving of cooked rice is ½ cup or about the size of half a baseball. Knowing serving sizes will help you be aware of how much food you are eating at restaurants. The list below tells you how big or small some common portion sizes are based on everyday objects:  ? 1 oz--4 stacked dice.  ? 3 oz--1 deck of cards.  ? 1 tsp--1 die.  ? 1 Tbsp--½ a ping-pong ball.  ? 2 Tbsp--1 ping-pong ball.  ? ½ cup--½ baseball.  ? 1 cup--1 baseball.  Summary  · Calorie counting means keeping track of how many calories you eat and drink each day. If you eat fewer calories than your body needs, you should lose weight.  · A healthy amount of weight to lose per week is usually 1-2 lb (0.5-0.9 kg). This usually means reducing your daily calorie intake by 500-750 calories.  · The number of calories in a food can be found on a Nutrition Facts label. If a food does not have a Nutrition Facts label, try to look up the calories online or ask your dietitian for help.  · Use your calories on foods and drinks that will fill you up, and not on foods and drinks that will leave you hungry.  · Use smaller plates, glasses, and bowls to prevent overeating.  This information is not intended to replace advice given to you by your health care provider. Make sure you discuss any questions you have with your health care provider.  Document Released: 12/18/2006 Document Revised: 09/06/2019 Document Reviewed: 11/17/2017  Elsevier Patient Education © 2020 [employer]

## 2020-09-19 PROCEDURE — U0002 COVID-19 LAB TEST NON-CDC: HCPCS | Performed by: NURSE PRACTITIONER

## 2020-09-21 ENCOUNTER — LAB (OUTPATIENT)
Dept: LAB | Facility: OTHER | Age: 55
End: 2020-09-21

## 2020-10-16 ENCOUNTER — OFFICE VISIT (OUTPATIENT)
Dept: FAMILY MEDICINE CLINIC | Facility: CLINIC | Age: 55
End: 2020-10-16

## 2020-10-16 VITALS — WEIGHT: 290 LBS | HEIGHT: 78 IN | BODY MASS INDEX: 33.55 KG/M2

## 2020-10-16 DIAGNOSIS — R10.32 ABDOMINAL PAIN, LEFT LOWER QUADRANT: ICD-10-CM

## 2020-10-16 DIAGNOSIS — K59.00 CONSTIPATION, UNSPECIFIED CONSTIPATION TYPE: ICD-10-CM

## 2020-10-16 DIAGNOSIS — K57.32 DIVERTICULITIS OF LARGE INTESTINE WITHOUT PERFORATION OR ABSCESS WITHOUT BLEEDING: Primary | ICD-10-CM

## 2020-10-16 PROCEDURE — 99442 PR PHYS/QHP TELEPHONE EVALUATION 11-20 MIN: CPT | Performed by: INTERNAL MEDICINE

## 2020-10-16 RX ORDER — CIPROFLOXACIN 500 MG/1
500 TABLET, FILM COATED ORAL 2 TIMES DAILY
Qty: 18 TABLET | Refills: 0 | Status: SHIPPED | OUTPATIENT
Start: 2020-10-16 | End: 2020-10-25

## 2020-10-16 RX ORDER — METRONIDAZOLE 250 MG/1
250 TABLET ORAL 3 TIMES DAILY
Qty: 21 TABLET | Refills: 0 | Status: SHIPPED | OUTPATIENT
Start: 2020-10-16 | End: 2020-10-23

## 2020-10-16 NOTE — PROGRESS NOTES
"Subjective      You have chosen to receive care through a telephone visit. Do you consent to use a telephone visit for your medical care today? Yes    Total visit time: 14 minutes.     History of Present Illness     Praveen Noonan is a 55 y.o. male with history of diverticular disease who reports 1-2 day history of left-sided abdominal pain, which he feels may be due to constipation, not relieved with two packs of Metamucil daily.  He has had two BMs today with quite a bit of stool, temporarily relieving the abdomninal pain.  I had him palpate the abdomen, for which he reports tenderness over the sigmoid colon area.  No notable fevers or chills.  No blood or mucus in the stool.  He had colonoscopy 07/2019 by Dr. Caban revealing a polyp in the ascending colon which was a serrated adenoma and diverticular disease with diverticuli in sigmoid and descending colon.  Recommended repeat colonoscopy in 3 years, making him due 07/2022.        Review of Systems   Constitutional: Negative for chills, fatigue and fever.   HENT: Negative for congestion, ear pain, postnasal drip, sinus pressure and sore throat.    Respiratory: Negative for cough, shortness of breath and wheezing.    Cardiovascular: Negative for chest pain, palpitations and leg swelling.   Gastrointestinal: Positive for abdominal pain and constipation. Negative for blood in stool, diarrhea, nausea and vomiting.   Endocrine: Negative for cold intolerance, heat intolerance, polydipsia and polyuria.   Genitourinary: Negative for dysuria, frequency, hematuria and urgency.   Skin: Negative for rash.   Neurological: Negative for syncope and weakness.        Objective     Visit Vitals  Ht 198.1 cm (78\")   Wt 132 kg (290 lb)   BMI 33.51 kg/m²     Physical Exam    Future Appointments   Date Time Provider Department Center   2/2/2021  9:30 AM Juarez Angel MD [facility] None       Assessment/Plan      For the acute diverticulitis, I sent prescriptions for Cipro 500 " mg b.i.d. x 9 days and Flagyl 250 mg t.i.d. x 7 days.  For the constipation, I recommended he continue 1 packet of metamucil daily increasing to 2 packets daily if he goes 2 days without a BM.  Follow a high-fiber diet.  Maintain excellent hydration.  Daily physical activity.  Notify me if abdominal pain is not resolved.  By next week, or if the constipation does not resolve.  His bowel movements are normally fairly regular, just the constipation more recently.    Return in February for routine follow up with fasting labs one week prior.         Scribed for Dr. Angel by Vandana Gomes [facility].     Diagnoses and all orders for this visit:    Diverticulitis of large intestine without perforation or abscess without bleeding    Abdominal pain, left lower quadrant    Constipation, unspecified constipation type    Other orders  -     ciprofloxacin (Cipro) 500 MG tablet; Take 1 tablet by mouth 2 (Two) Times a Day for 9 days.  -     metroNIDAZOLE (Flagyl) 250 MG tablet; Take 1 tablet by mouth 3 (Three) Times a Day for 7 days.        No visits with results within 3 Week(s) from this visit.   Latest known visit with results is:   Admission on 09/19/2020, Discharged on 09/19/2020   Component Date Value Ref Range Status   • COVID19 09/19/2020 Not Detected  Not Detected - Ref. Range Final   ]

## 2020-10-19 RX ORDER — ATORVASTATIN CALCIUM 10 MG/1
TABLET, FILM COATED ORAL
Qty: 30 TABLET | Refills: 0 | Status: SHIPPED | OUTPATIENT
Start: 2020-10-19 | End: 2022-03-14 | Stop reason: DRUGHIGH

## 2020-10-19 RX ORDER — OMEPRAZOLE 40 MG/1
CAPSULE, DELAYED RELEASE ORAL
Qty: 30 CAPSULE | Refills: 0 | Status: SHIPPED | OUTPATIENT
Start: 2020-10-19 | End: 2020-11-17

## 2020-11-05 ENCOUNTER — LAB (OUTPATIENT)
Dept: LAB | Facility: OTHER | Age: 55
End: 2020-11-05

## 2020-11-05 PROCEDURE — U0003 INFECTIOUS AGENT DETECTION BY NUCLEIC ACID (DNA OR RNA); SEVERE ACUTE RESPIRATORY SYNDROME CORONAVIRUS 2 (SARS-COV-2) (CORONAVIRUS DISEASE [COVID-19]), AMPLIFIED PROBE TECHNIQUE, MAKING USE OF HIGH THROUGHPUT TECHNOLOGIES AS DESCRIBED BY CMS-2020-01-R: HCPCS | Performed by: PHYSICIAN ASSISTANT

## 2020-11-17 RX ORDER — OMEPRAZOLE 40 MG/1
CAPSULE, DELAYED RELEASE ORAL
Qty: 30 CAPSULE | Refills: 11 | Status: SHIPPED | OUTPATIENT
Start: 2020-11-17 | End: 2021-11-19

## 2021-01-16 PROCEDURE — U0003 INFECTIOUS AGENT DETECTION BY NUCLEIC ACID (DNA OR RNA); SEVERE ACUTE RESPIRATORY SYNDROME CORONAVIRUS 2 (SARS-COV-2) (CORONAVIRUS DISEASE [COVID-19]), AMPLIFIED PROBE TECHNIQUE, MAKING USE OF HIGH THROUGHPUT TECHNOLOGIES AS DESCRIBED BY CMS-2020-01-R: HCPCS | Performed by: NURSE PRACTITIONER

## 2021-03-01 ENCOUNTER — LAB (OUTPATIENT)
Dept: LAB | Facility: OTHER | Age: 56
End: 2021-03-01

## 2021-03-01 DIAGNOSIS — I10 ESSENTIAL HYPERTENSION: Chronic | ICD-10-CM

## 2021-03-01 DIAGNOSIS — E83.52 HYPERCALCEMIA: ICD-10-CM

## 2021-03-01 DIAGNOSIS — E78.2 MIXED HYPERLIPIDEMIA: Chronic | ICD-10-CM

## 2021-03-01 DIAGNOSIS — E11.9 TYPE 2 DIABETES MELLITUS WITHOUT COMPLICATION, WITHOUT LONG-TERM CURRENT USE OF INSULIN (HCC): Chronic | ICD-10-CM

## 2021-03-01 DIAGNOSIS — Z12.5 SPECIAL SCREENING FOR MALIGNANT NEOPLASM OF PROSTATE: ICD-10-CM

## 2021-03-01 LAB
ALBUMIN SERPL-MCNC: 4 G/DL (ref 3.5–5)
ALBUMIN/GLOB SERPL: 1.2 G/DL (ref 1.1–1.8)
ALP SERPL-CCNC: 102 U/L (ref 38–126)
ALT SERPL W P-5'-P-CCNC: 55 U/L
ANION GAP SERPL CALCULATED.3IONS-SCNC: 6 MMOL/L (ref 5–15)
AST SERPL-CCNC: 36 U/L (ref 17–59)
BASOPHILS # BLD AUTO: 0.03 10*3/MM3 (ref 0–0.2)
BASOPHILS NFR BLD AUTO: 0.4 % (ref 0–1.5)
BILIRUB SERPL-MCNC: 0.3 MG/DL (ref 0.2–1.3)
BUN SERPL-MCNC: 13 MG/DL (ref 7–23)
BUN/CREAT SERPL: 12.7 (ref 7–25)
CALCIUM SPEC-SCNC: 9.9 MG/DL (ref 8.4–10.2)
CHLORIDE SERPL-SCNC: 103 MMOL/L (ref 101–112)
CHOLEST SERPL-MCNC: 163 MG/DL (ref 150–200)
CO2 SERPL-SCNC: 29 MMOL/L (ref 22–30)
CREAT SERPL-MCNC: 1.02 MG/DL (ref 0.7–1.3)
DEPRECATED RDW RBC AUTO: 47.4 FL (ref 37–54)
EOSINOPHIL # BLD AUTO: 0.19 10*3/MM3 (ref 0–0.4)
EOSINOPHIL NFR BLD AUTO: 2.8 % (ref 0.3–6.2)
ERYTHROCYTE [DISTWIDTH] IN BLOOD BY AUTOMATED COUNT: 14.4 % (ref 12.3–15.4)
GFR SERPL CREATININE-BSD FRML MDRD: 92 ML/MIN/1.73 (ref 56–130)
GLOBULIN UR ELPH-MCNC: 3.4 GM/DL (ref 2.3–3.5)
GLUCOSE SERPL-MCNC: 141 MG/DL (ref 70–99)
HBA1C MFR BLD: 6.9 % (ref 4.8–5.6)
HCT VFR BLD AUTO: 40.2 % (ref 37.5–51)
HDLC SERPL-MCNC: 33 MG/DL (ref 40–59)
HGB BLD-MCNC: 13.2 G/DL (ref 13–17.7)
LDLC SERPL CALC-MCNC: 105 MG/DL
LDLC/HDLC SERPL: 3.1 {RATIO} (ref 0–3.55)
LYMPHOCYTES # BLD AUTO: 3.58 10*3/MM3 (ref 0.7–3.1)
LYMPHOCYTES NFR BLD AUTO: 53 % (ref 19.6–45.3)
MCH RBC QN AUTO: 30.1 PG (ref 26.6–33)
MCHC RBC AUTO-ENTMCNC: 32.8 G/DL (ref 31.5–35.7)
MCV RBC AUTO: 91.8 FL (ref 79–97)
MONOCYTES # BLD AUTO: 0.76 10*3/MM3 (ref 0.1–0.9)
MONOCYTES NFR BLD AUTO: 11.3 % (ref 5–12)
NEUTROPHILS NFR BLD AUTO: 2.19 10*3/MM3 (ref 1.7–7)
NEUTROPHILS NFR BLD AUTO: 32.5 % (ref 42.7–76)
PLATELET # BLD AUTO: 275 10*3/MM3 (ref 140–450)
PMV BLD AUTO: 8.7 FL (ref 6–12)
POTASSIUM SERPL-SCNC: 4 MMOL/L (ref 3.4–5)
PROT SERPL-MCNC: 7.4 G/DL (ref 6.3–8.6)
PSA SERPL-MCNC: 0.07 NG/ML (ref 0–4)
PTH-INTACT SERPL-MCNC: 28.8 PG/ML (ref 15–65)
RBC # BLD AUTO: 4.38 10*6/MM3 (ref 4.14–5.8)
SODIUM SERPL-SCNC: 138 MMOL/L (ref 137–145)
TRIGL SERPL-MCNC: 139 MG/DL
VLDLC SERPL-MCNC: 25 MG/DL (ref 5–40)
WBC # BLD AUTO: 6.75 10*3/MM3 (ref 3.4–10.8)

## 2021-03-01 PROCEDURE — 83970 ASSAY OF PARATHORMONE: CPT | Performed by: INTERNAL MEDICINE

## 2021-03-01 PROCEDURE — 85025 COMPLETE CBC W/AUTO DIFF WBC: CPT | Performed by: INTERNAL MEDICINE

## 2021-03-01 PROCEDURE — 80053 COMPREHEN METABOLIC PANEL: CPT | Performed by: INTERNAL MEDICINE

## 2021-03-01 PROCEDURE — G0103 PSA SCREENING: HCPCS | Performed by: INTERNAL MEDICINE

## 2021-03-01 PROCEDURE — 36415 COLL VENOUS BLD VENIPUNCTURE: CPT | Performed by: INTERNAL MEDICINE

## 2021-03-01 PROCEDURE — 83036 HEMOGLOBIN GLYCOSYLATED A1C: CPT | Performed by: INTERNAL MEDICINE

## 2021-03-01 PROCEDURE — 80061 LIPID PANEL: CPT | Performed by: INTERNAL MEDICINE

## 2021-06-07 ENCOUNTER — OFFICE VISIT (OUTPATIENT)
Dept: FAMILY MEDICINE CLINIC | Facility: CLINIC | Age: 56
End: 2021-06-07

## 2021-06-07 VITALS
DIASTOLIC BLOOD PRESSURE: 70 MMHG | HEART RATE: 92 BPM | BODY MASS INDEX: 33.48 KG/M2 | SYSTOLIC BLOOD PRESSURE: 120 MMHG | WEIGHT: 289.4 LBS | HEIGHT: 78 IN | TEMPERATURE: 98.4 F

## 2021-06-07 DIAGNOSIS — I48.0 PAROXYSMAL ATRIAL FIBRILLATION (HCC): Chronic | ICD-10-CM

## 2021-06-07 DIAGNOSIS — M77.52 TENDINITIS OF BOTH ANKLES: Primary | ICD-10-CM

## 2021-06-07 DIAGNOSIS — E66.09 CLASS 1 OBESITY DUE TO EXCESS CALORIES WITH SERIOUS COMORBIDITY AND BODY MASS INDEX (BMI) OF 33.0 TO 33.9 IN ADULT: Chronic | ICD-10-CM

## 2021-06-07 DIAGNOSIS — E78.2 MIXED HYPERLIPIDEMIA: Chronic | ICD-10-CM

## 2021-06-07 DIAGNOSIS — Z79.01 CHRONIC ANTICOAGULATION: Chronic | ICD-10-CM

## 2021-06-07 DIAGNOSIS — I10 ESSENTIAL HYPERTENSION: Chronic | ICD-10-CM

## 2021-06-07 DIAGNOSIS — E11.9 TYPE 2 DIABETES MELLITUS WITHOUT COMPLICATION, WITHOUT LONG-TERM CURRENT USE OF INSULIN (HCC): Chronic | ICD-10-CM

## 2021-06-07 DIAGNOSIS — M77.51 TENDINITIS OF BOTH ANKLES: Primary | ICD-10-CM

## 2021-06-07 PROCEDURE — 99214 OFFICE O/P EST MOD 30 MIN: CPT | Performed by: INTERNAL MEDICINE

## 2021-06-07 RX ORDER — CELECOXIB 200 MG/1
200 CAPSULE ORAL DAILY
Qty: 10 CAPSULE | Refills: 0 | Status: SHIPPED | OUTPATIENT
Start: 2021-06-07 | End: 2021-07-30

## 2021-06-07 RX ORDER — SEMAGLUTIDE 1.34 MG/ML
0.5 INJECTION, SOLUTION SUBCUTANEOUS WEEKLY
Qty: 1 PEN | Refills: 6 | Status: SHIPPED | OUTPATIENT
Start: 2021-06-07 | End: 2021-09-07 | Stop reason: SDUPTHER

## 2021-06-07 NOTE — PROGRESS NOTES
"Chief Complaint  Ankle Pain (bilateral x 1 1/2 week. More right than left. No injury known )    Subjective          History of Present Illness     Praveen Noonan presents to the clinic reporting new onset of bilateral ankle pain, left greater than right for the past 1-2 weeks.  Denies known injury.  Pain is worse with weightbearing and ankle gets stiff/tight at times, especially after sitting for an extended amount of time.  He is describing tendinitis symptoms.    His cardiologist is Dr. Hillman who follows his paroxysmal a-fib, for which he continues on Eliquis.  We have him avoiding frequent or chronic use of NSAIDs due to anticoagulation therapy, although, he can tolerate a short course of Celebrex.        Patient has chronic medical issues including hyperlipidemia, hypertension, tobacco dependence syndrome, GERD, and Type 2 diabetes mellitus.  He completed fasting labs three months ago (03/2021), but cancelled follow up appointment to review labs.  Those labs are reviewed with patient today.      Weight is up 9 pounds in the past 7 months.  Diabetes had progressed somewhat with A1c 6.9 increased from 6.5 last summer on a combination of metformin and low-dose Amaryl.  We discussed replacing the Amaryl with weekly Ozempic, which will also provide some appetite suppression to help achieve weight loss.  He is in agreement to this change.       Liver enzymes remain slightly elevated, more than likely due to fatty liver.  Denies any alcohol use.  Normal renal and liver function.  PSA reveals no evidence of prostate cancer.     Blood pressure is at goal.      He tested positive for COVID-19 in January/early February and subsequently was vaccinated for COVID-19 with [employer] vaccine 04/2021 without difficulty or complication.       Objective   Vital Signs:   /70   Pulse 92   Temp 98.4 °F (36.9 °C) (Tympanic)   Ht 198.1 cm (78\")   Wt 131 kg (289 lb 6.4 oz)   BMI 33.44 kg/m²        Physical " Exam  Constitutional:       General: He is not in acute distress.     Appearance: He is well-developed.      Comments: Pleasant male, obese.    HENT:      Head: Normocephalic and atraumatic.      Nose: Nose normal.      Mouth/Throat:      Pharynx: No oropharyngeal exudate.   Eyes:      Pupils: Pupils are equal, round, and reactive to light.   Neck:      Thyroid: No thyromegaly.      Vascular: No JVD.   Cardiovascular:      Rate and Rhythm: Normal rate and regular rhythm.      Heart sounds: Normal heart sounds.   Pulmonary:      Effort: Pulmonary effort is normal. No accessory muscle usage or respiratory distress.      Breath sounds: Normal breath sounds. No wheezing or rales.   Abdominal:      General: Bowel sounds are normal. There is no distension.      Palpations: Abdomen is soft.      Tenderness: There is no abdominal tenderness.      Comments: Obese abdomen.    Musculoskeletal:      Cervical back: Neck supple.   Lymphadenopathy:      Cervical: No cervical adenopathy.   Neurological:      Mental Status: He is alert and oriented to person, place, and time.      Cranial Nerves: No cranial nerve deficit.   Psychiatric:         Speech: Speech normal.         Behavior: Behavior normal.         Thought Content: Thought content normal.         Judgment: Judgment normal.            Result Review :     CMP    CMP 7/21/20 3/1/21   Glucose 97 141 (A)   BUN 16 13   Creatinine 1.01 1.02   eGFR African Am 94 92   Sodium 138 138   Potassium 4.3 4.0   Chloride 101 103   Calcium 10.7 (A) 9.9   Albumin 4.20 4.00   Total Bilirubin 0.5 0.3   Alkaline Phosphatase 88 102   AST (SGOT) 43 36   ALT (SGPT) 56 (A) 55 (A)   (A) Abnormal value            CBC w/diff    CBC w/Diff 7/21/20 3/1/21   WBC 7.46 6.75   RBC 4.35 4.38   Hemoglobin 13.3 13.2   Hematocrit 40.0 40.2   MCV 92.0 91.8   MCH 30.6 30.1   MCHC 33.3 32.8   RDW 14.0 14.4   Platelets 273 275   Neutrophil Rel % 39.9 (A) 32.5 (A)   Lymphocyte Rel % 44.6 53.0 (A)   Monocyte Rel %  12.6 (A) 11.3   Eosinophil Rel % 2.5 2.8   Basophil Rel % 0.4 0.4   (A) Abnormal value            Lipid Panel    Lipid Panel 7/21/20 3/1/21   Total Cholesterol 152 163   Triglycerides 125 139   HDL Cholesterol 29 (A) 33 (A)   VLDL Cholesterol 25 25   LDL Cholesterol  98 105 (A)   LDL/HDL Ratio 3.38 3.10   (A) Abnormal value            TSH    TSH 7/21/20   TSH 1.490           A1C Last 3 Results    HGBA1C Last 3 Results 7/21/20 3/1/21   Hemoglobin A1C 6.50 (A) 6.90 (A)   (A) Abnormal value                      Assessment and Plan    Diagnoses and all orders for this visit:    1. Tendinitis of both ankles (Primary)    2. Class 1 obesity due to excess calories with serious comorbidity and body mass index (BMI) of 33.0 to 33.9 in adult    3. Chronic anticoagulation -Eliquis due to atrial fibrillation  -     CBC Auto Differential; Future    4. Essential hypertension  -     Comprehensive Metabolic Panel; Future    5. Type 2 diabetes mellitus without complication, without long-term current use of insulin (CMS/HCC) - diagnosed 9/2018  -     CBC Auto Differential; Future  -     Comprehensive Metabolic Panel; Future  -     Hemoglobin A1c; Future  -     Microalbumin / Creatinine Urine Ratio - Urine, Clean Catch; Future  -     TSH; Future    6. Mixed hyperlipidemia  -     Lipid Panel; Future    7. Paroxysmal atrial fibrillation (CMS/HCC)  -     CBC Auto Differential; Future    Other orders  -     Semaglutide,0.25 or 0.5MG/DOS, (Ozempic, 0.25 or 0.5 MG/DOSE,) 2 MG/1.5ML solution pen-injector; Inject 0.5 mg under the skin into the appropriate area as directed 1 (One) Time Per Week.  Dispense: 1 pen; Refill: 6  -     celecoxib (CeleBREX) 200 MG capsule; Take 1 capsule by mouth Daily. With food  Dispense: 10 capsule; Refill: 0      I spent 34 minutes caring for Praveen on this date of service. This time includes time spent by me in the following activities:preparing for the visit, reviewing tests, obtaining and/or reviewing a  separately obtained history, performing a medically appropriate examination and/or evaluation , counseling and educating the patient/family/caregiver, ordering medications, tests, or procedures, documenting information in the medical record, independently interpreting results and communicating that information with the patient/family/caregiver and care coordination       For tendinitis of bilateral ankles, right greater than left, I recommended a lace up ankle brace and applying cool pack 10-15 minutes twice daily to provide temporary pain relief. As pain improves, I recommended range of motion exercises.  I sent a prescription for Celebrex 100 mg to take one q.d. with food for up to 10 days.  He should continue to avoid chronic NSAID use with anticoagulation Eliquis. Monitor for GI intolerance.  Recommended supportive shoes with cushioned insole.     Continue Prilosec for GERD.    Continue Lipitor.  Pursue dietary efforts.         We will stop the Amaryl and start weekly weekly Ozempic 0.25 mg for the first two weeks progressing to 0.50 mg weekly thereafter.  Continue the metformin 850 mg twice daily.  Continue diabetic monitoring.  I explained he will need to pursue smaller portions of low carbohydrate, diabetic diet in combination with the weekly Ozempic for optimal efficacy with weight loss results/diabetic management, and to reduce GI side effects.        Return in three months to get back on track for 6-month follow up visits with labs one week prior or sooner if needed.      Scribed for Dr. Angel by Tessa Rinaldi.     Follow Up   Return in about 3 months (around 9/7/2021) for Next scheduled follow up.  Patient was given instructions and counseling regarding his condition or for health maintenance advice. Please see specific information pulled into the AVS if appropriate.

## 2021-08-31 ENCOUNTER — LAB (OUTPATIENT)
Dept: LAB | Facility: OTHER | Age: 56
End: 2021-08-31

## 2021-08-31 DIAGNOSIS — E11.9 TYPE 2 DIABETES MELLITUS WITHOUT COMPLICATION, WITHOUT LONG-TERM CURRENT USE OF INSULIN (HCC): Chronic | ICD-10-CM

## 2021-08-31 DIAGNOSIS — I48.0 PAROXYSMAL ATRIAL FIBRILLATION (HCC): Chronic | ICD-10-CM

## 2021-08-31 DIAGNOSIS — E78.2 MIXED HYPERLIPIDEMIA: Chronic | ICD-10-CM

## 2021-08-31 DIAGNOSIS — I10 ESSENTIAL HYPERTENSION: Chronic | ICD-10-CM

## 2021-08-31 DIAGNOSIS — Z79.01 CHRONIC ANTICOAGULATION: Chronic | ICD-10-CM

## 2021-08-31 LAB
ALBUMIN SERPL-MCNC: 4.2 G/DL (ref 3.5–5)
ALBUMIN UR-MCNC: <1.2 MG/DL
ALBUMIN/GLOB SERPL: 1.2 G/DL (ref 1.1–1.8)
ALP SERPL-CCNC: 86 U/L (ref 38–126)
ALT SERPL W P-5'-P-CCNC: 41 U/L
ANION GAP SERPL CALCULATED.3IONS-SCNC: 5 MMOL/L (ref 5–15)
AST SERPL-CCNC: 33 U/L (ref 17–59)
BASOPHILS # BLD AUTO: 0.04 10*3/MM3 (ref 0–0.2)
BASOPHILS NFR BLD AUTO: 0.6 % (ref 0–1.5)
BILIRUB SERPL-MCNC: 0.3 MG/DL (ref 0.2–1.3)
BUN SERPL-MCNC: 16 MG/DL (ref 7–23)
BUN/CREAT SERPL: 14.2 (ref 7–25)
CALCIUM SPEC-SCNC: 10.6 MG/DL (ref 8.4–10.2)
CHLORIDE SERPL-SCNC: 103 MMOL/L (ref 101–112)
CHOLEST SERPL-MCNC: 120 MG/DL (ref 150–200)
CO2 SERPL-SCNC: 29 MMOL/L (ref 22–30)
CREAT SERPL-MCNC: 1.13 MG/DL (ref 0.7–1.3)
CREAT UR-MCNC: 112.7 MG/DL
DEPRECATED RDW RBC AUTO: 46 FL (ref 37–54)
EOSINOPHIL # BLD AUTO: 0.19 10*3/MM3 (ref 0–0.4)
EOSINOPHIL NFR BLD AUTO: 2.6 % (ref 0.3–6.2)
ERYTHROCYTE [DISTWIDTH] IN BLOOD BY AUTOMATED COUNT: 14.2 % (ref 12.3–15.4)
GFR SERPL CREATININE-BSD FRML MDRD: 82 ML/MIN/1.73 (ref 56–130)
GLOBULIN UR ELPH-MCNC: 3.4 GM/DL (ref 2.3–3.5)
GLUCOSE SERPL-MCNC: 100 MG/DL (ref 70–99)
HBA1C MFR BLD: 6.3 % (ref 4.8–5.6)
HCT VFR BLD AUTO: 38.8 % (ref 37.5–51)
HDLC SERPL-MCNC: 28 MG/DL (ref 40–59)
HGB BLD-MCNC: 13.1 G/DL (ref 13–17.7)
LDLC SERPL CALC-MCNC: 72 MG/DL
LDLC/HDLC SERPL: 2.53 {RATIO} (ref 0–3.55)
LYMPHOCYTES # BLD AUTO: 3.54 10*3/MM3 (ref 0.7–3.1)
LYMPHOCYTES NFR BLD AUTO: 49.2 % (ref 19.6–45.3)
MCH RBC QN AUTO: 30.7 PG (ref 26.6–33)
MCHC RBC AUTO-ENTMCNC: 33.8 G/DL (ref 31.5–35.7)
MCV RBC AUTO: 90.9 FL (ref 79–97)
MICROALBUMIN/CREAT UR: NORMAL MG/G{CREAT}
MONOCYTES # BLD AUTO: 0.75 10*3/MM3 (ref 0.1–0.9)
MONOCYTES NFR BLD AUTO: 10.4 % (ref 5–12)
NEUTROPHILS NFR BLD AUTO: 2.68 10*3/MM3 (ref 1.7–7)
NEUTROPHILS NFR BLD AUTO: 37.2 % (ref 42.7–76)
PLATELET # BLD AUTO: 258 10*3/MM3 (ref 140–450)
PMV BLD AUTO: 8.3 FL (ref 6–12)
POTASSIUM SERPL-SCNC: 4.2 MMOL/L (ref 3.4–5)
PROT SERPL-MCNC: 7.6 G/DL (ref 6.3–8.6)
RBC # BLD AUTO: 4.27 10*6/MM3 (ref 4.14–5.8)
SODIUM SERPL-SCNC: 137 MMOL/L (ref 137–145)
TRIGL SERPL-MCNC: 106 MG/DL
TSH SERPL DL<=0.05 MIU/L-ACNC: 1.55 UIU/ML (ref 0.27–4.2)
VLDLC SERPL-MCNC: 20 MG/DL (ref 5–40)
WBC # BLD AUTO: 7.2 10*3/MM3 (ref 3.4–10.8)

## 2021-08-31 PROCEDURE — 85025 COMPLETE CBC W/AUTO DIFF WBC: CPT | Performed by: INTERNAL MEDICINE

## 2021-08-31 PROCEDURE — 82570 ASSAY OF URINE CREATININE: CPT | Performed by: INTERNAL MEDICINE

## 2021-08-31 PROCEDURE — 84443 ASSAY THYROID STIM HORMONE: CPT | Performed by: INTERNAL MEDICINE

## 2021-08-31 PROCEDURE — 36415 COLL VENOUS BLD VENIPUNCTURE: CPT | Performed by: INTERNAL MEDICINE

## 2021-08-31 PROCEDURE — 80053 COMPREHEN METABOLIC PANEL: CPT | Performed by: INTERNAL MEDICINE

## 2021-08-31 PROCEDURE — 80061 LIPID PANEL: CPT | Performed by: INTERNAL MEDICINE

## 2021-08-31 PROCEDURE — 83036 HEMOGLOBIN GLYCOSYLATED A1C: CPT | Performed by: INTERNAL MEDICINE

## 2021-08-31 PROCEDURE — 82043 UR ALBUMIN QUANTITATIVE: CPT | Performed by: INTERNAL MEDICINE

## 2021-09-07 ENCOUNTER — OFFICE VISIT (OUTPATIENT)
Dept: FAMILY MEDICINE CLINIC | Facility: CLINIC | Age: 56
End: 2021-09-07

## 2021-09-07 VITALS
SYSTOLIC BLOOD PRESSURE: 136 MMHG | TEMPERATURE: 97.7 F | HEART RATE: 84 BPM | HEIGHT: 78 IN | BODY MASS INDEX: 31.31 KG/M2 | DIASTOLIC BLOOD PRESSURE: 80 MMHG | WEIGHT: 270.6 LBS

## 2021-09-07 DIAGNOSIS — L85.3 DRY SKIN: ICD-10-CM

## 2021-09-07 DIAGNOSIS — I48.0 PAROXYSMAL ATRIAL FIBRILLATION (HCC): Chronic | ICD-10-CM

## 2021-09-07 DIAGNOSIS — E83.52 HYPERCALCEMIA: ICD-10-CM

## 2021-09-07 DIAGNOSIS — K29.00 ACUTE GASTRITIS WITHOUT HEMORRHAGE, UNSPECIFIED GASTRITIS TYPE: ICD-10-CM

## 2021-09-07 DIAGNOSIS — Z12.5 SPECIAL SCREENING FOR MALIGNANT NEOPLASM OF PROSTATE: ICD-10-CM

## 2021-09-07 DIAGNOSIS — I10 ESSENTIAL HYPERTENSION: Chronic | ICD-10-CM

## 2021-09-07 DIAGNOSIS — K21.9 GASTROESOPHAGEAL REFLUX DISEASE WITHOUT ESOPHAGITIS: Chronic | ICD-10-CM

## 2021-09-07 DIAGNOSIS — E11.9 TYPE 2 DIABETES MELLITUS WITHOUT COMPLICATION, WITHOUT LONG-TERM CURRENT USE OF INSULIN (HCC): Primary | Chronic | ICD-10-CM

## 2021-09-07 DIAGNOSIS — E66.09 CLASS 1 OBESITY DUE TO EXCESS CALORIES WITH SERIOUS COMORBIDITY AND BODY MASS INDEX (BMI) OF 31.0 TO 31.9 IN ADULT: Chronic | ICD-10-CM

## 2021-09-07 DIAGNOSIS — Z79.01 CHRONIC ANTICOAGULATION: Chronic | ICD-10-CM

## 2021-09-07 DIAGNOSIS — E78.2 MIXED HYPERLIPIDEMIA: Chronic | ICD-10-CM

## 2021-09-07 PROBLEM — R51.9 HEADACHE: Status: ACTIVE | Noted: 2021-09-07

## 2021-09-07 PROBLEM — J01.90 ACUTE SINUSITIS: Status: ACTIVE | Noted: 2021-09-07

## 2021-09-07 PROBLEM — I49.8 FLUTTERING HEART: Status: ACTIVE | Noted: 2018-03-19

## 2021-09-07 PROBLEM — Z98.890 STATUS POST ABLATION OF ATRIAL FLUTTER: Status: ACTIVE | Noted: 2021-09-07

## 2021-09-07 PROBLEM — J41.0 SIMPLE CHRONIC BRONCHITIS (HCC): Status: ACTIVE | Noted: 2021-09-07

## 2021-09-07 PROBLEM — R07.2 PRECORDIAL PAIN: Status: ACTIVE | Noted: 2021-03-08

## 2021-09-07 PROBLEM — R09.1 PLEURISY: Status: ACTIVE | Noted: 2021-09-07

## 2021-09-07 PROBLEM — R05.9 COUGH: Status: ACTIVE | Noted: 2021-09-07

## 2021-09-07 PROBLEM — R42 LIGHTHEADEDNESS: Status: ACTIVE | Noted: 2021-09-07

## 2021-09-07 PROBLEM — R06.09 DYSPNEA ON EXERTION: Status: ACTIVE | Noted: 2021-09-07

## 2021-09-07 PROBLEM — R42 DIZZY SPELLS: Status: ACTIVE | Noted: 2018-05-29

## 2021-09-07 PROBLEM — I50.9 CONGESTIVE HEART FAILURE (HCC): Status: ACTIVE | Noted: 2021-09-07

## 2021-09-07 PROBLEM — Z86.79 STATUS POST ABLATION OF ATRIAL FLUTTER: Status: ACTIVE | Noted: 2021-09-07

## 2021-09-07 PROBLEM — Z98.890 H/O REMOVAL OF CYST: Status: ACTIVE | Noted: 2021-09-07

## 2021-09-07 PROBLEM — E86.0 DEHYDRATION: Status: ACTIVE | Noted: 2021-09-07

## 2021-09-07 PROCEDURE — 99215 OFFICE O/P EST HI 40 MIN: CPT | Performed by: INTERNAL MEDICINE

## 2021-09-07 RX ORDER — SEMAGLUTIDE 1.34 MG/ML
0.5 INJECTION, SOLUTION SUBCUTANEOUS WEEKLY
Qty: 1 PEN | Refills: 6 | Status: SHIPPED | OUTPATIENT
Start: 2021-09-07 | End: 2022-02-06 | Stop reason: SDUPTHER

## 2021-09-07 NOTE — PROGRESS NOTES
Chief Complaint  Follow-up (6 month. He had been to Urgent care in August for gastritis but that has improved )    Subjective          History of Present Illness     Praveen Noonan presents to the clinic for 6-month follow up on multiple complex chronic medical issues including hyperlipidemia, hypertension, tobacco dependence syndrome, GERD, and Type 2 diabetes mellitus. His cardiologist is Dr. Hillman who follows his paroxysmal a-fib, for which he continues on Eliquis. Denies recent flares of a-fib.  Dr. Hillman has scheduled screening CT of the chest due to patient's history of tobacco use.       Weght is down 20 pounds in the past 11 months getting his BMI down to 31.3.  He continues to experience gradual weight loss and attributes the weight loss to a combination of adding weekly Ozempic and  decreasing caloric intake significantly.  With the weight loss, diabetes has improved from six months ago with A1c 6.3 decreased from 6.9.  He also has a history of elevated liver enzymes due to fatty liver, although, fortunately with weight loss, liver enzymes have normalized.      He continues to struggle with right-sided sciatica aggravated by bending and stooping. He is not currently taking anything for the sciatic pain.  He applies heating pad for temporary relief.  He is able to sleep at night when he gets in a comfortable position.  We have him avoiding frequent or chronic use of NSAIDs due to anticoagulation therapy.  He recently had a flare of gastritis symptoms despite taking Prilosec 40 mg daily.  We discussed benefits of weight loss to improve GERD management.      He tested positive for COVID-19 in January/early February and subsequently was vaccinated for COVID-19 with [employer] vaccine 04/2021 without difficulty or complication.     Labs reveal mild hypercalcemia.  Denies consuming a lot of foods high in calcium.  We will continue to monitor.       The patient's relevant past medical, surgical, and social  "history was reviewed in Epic.   Lab results are reviewed with the patient today.  CBC unremarkable. Total cholesterol and LDL cholesterol at goal, although, his low HDL gives him a marginal cholesterol ratio 2.5.       Objective   Vital Signs:   /80   Pulse 84   Temp 97.7 °F (36.5 °C) (Tympanic)   Ht 198.1 cm (78\")   Wt 123 kg (270 lb 9.6 oz)   BMI 31.27 kg/m²       Physical Exam  Vitals reviewed.   Constitutional:       General: He is not in acute distress.     Appearance: He is well-developed.      Comments: Pleasant male, obese.    HENT:      Head: Normocephalic and atraumatic.      Nose:      Right Sinus: No maxillary sinus tenderness or frontal sinus tenderness.      Left Sinus: No maxillary sinus tenderness or frontal sinus tenderness.      Mouth/Throat:      Mouth: No oral lesions.      Pharynx: Uvula midline.      Tonsils: No tonsillar exudate.   Eyes:      Conjunctiva/sclera: Conjunctivae normal.      Pupils: Pupils are equal, round, and reactive to light.   Neck:      Thyroid: No thyroid mass or thyromegaly.      Vascular: No carotid bruit or JVD.      Trachea: Trachea normal. No tracheal deviation.   Cardiovascular:      Rate and Rhythm: Normal rate and regular rhythm.  No extrasystoles are present.     Chest Wall: PMI is not displaced.      Heart sounds: Normal heart sounds. No murmur heard.        Comments: HR 80.  Pulmonary:      Effort: Pulmonary effort is normal. No accessory muscle usage or respiratory distress.      Breath sounds: Normal breath sounds. No decreased breath sounds, wheezing, rhonchi or rales.   Abdominal:      General: Bowel sounds are normal. There is no distension.      Palpations: Abdomen is soft.      Tenderness: There is no abdominal tenderness.      Comments: Obese abdomen limits exam.   Musculoskeletal:      Cervical back: Neck supple.   Lymphadenopathy:      Cervical: No cervical adenopathy.   Skin:     General: Skin is warm and dry.      Findings: No rash.      " Nails: There is no clubbing.      Comments: Excessively dry skin bilateral ankles. Benign-appearing SK on left lower extremity.    Neurological:      Mental Status: He is alert and oriented to person, place, and time.      Cranial Nerves: No cranial nerve deficit.      Coordination: Coordination normal.   Psychiatric:         Speech: Speech normal.         Behavior: Behavior normal.         Thought Content: Thought content normal.         Judgment: Judgment normal.            Result Review :{Labs  Result Review  Imaging  Med Tab  Media  Procedures :23}     CMP    CMP 3/1/21 8/31/21   Glucose 141 (A) 100 (A)   BUN 13 16   Creatinine 1.02 1.13   eGFR African Am 92 82   Sodium 138 137   Potassium 4.0 4.2   Chloride 103 103   Calcium 9.9 10.6 (A)   Albumin 4.00 4.20   Total Bilirubin 0.3 0.3   Alkaline Phosphatase 102 86   AST (SGOT) 36 33   ALT (SGPT) 55 (A) 41   (A) Abnormal value            CBC w/diff    CBC w/Diff 3/1/21 8/31/21   WBC 6.75 7.20   RBC 4.38 4.27   Hemoglobin 13.2 13.1   Hematocrit 40.2 38.8   MCV 91.8 90.9   MCH 30.1 30.7   MCHC 32.8 33.8   RDW 14.4 14.2   Platelets 275 258   Neutrophil Rel % 32.5 (A) 37.2 (A)   Lymphocyte Rel % 53.0 (A) 49.2 (A)   Monocyte Rel % 11.3 10.4   Eosinophil Rel % 2.8 2.6   Basophil Rel % 0.4 0.6   (A) Abnormal value            Lipid Panel    Lipid Panel 3/1/21 8/31/21   Total Cholesterol 163 120 (A)   Triglycerides 139 106   HDL Cholesterol 33 (A) 28 (A)   VLDL Cholesterol 25 20   LDL Cholesterol  105 (A) 72   LDL/HDL Ratio 3.10 2.53   (A) Abnormal value            TSH    TSH 8/31/21   TSH 1.550           A1C Last 3 Results    HGBA1C Last 3 Results 3/1/21 8/31/21   Hemoglobin A1C 6.90 (A) 6.30 (A)   (A) Abnormal value            PSA    PSA 3/1/21   PSA 0.066                     Assessment and Plan    Diagnoses and all orders for this visit:    1. Type 2 diabetes mellitus without complication, without long-term current use of insulin (CMS/[facility]) - diagnosed 9/2018  (Primary)  -     CBC Auto Differential; Future  -     Comprehensive Metabolic Panel; Future  -     Hemoglobin A1c; Future  -     Lipid Panel; Future  -     Microalbumin / Creatinine Urine Ratio - Urine, Clean Catch; Future  -     Vitamin B12; Future    2. Mixed hyperlipidemia  -     Lipid Panel; Future    3. Essential hypertension  -     Comprehensive Metabolic Panel; Future    4. Paroxysmal atrial fibrillation (CMS/HCC)  -     Comprehensive Metabolic Panel; Future    5. Chronic anticoagulation -Eliquis due to atrial fibrillation  -     CBC Auto Differential; Future    6. Class 1 obesity due to excess calories with serious comorbidity and body mass index (BMI) of 31.0 to 31.9 in adult    7. Gastroesophageal reflux disease without esophagitis    8. Acute gastritis without hemorrhage, unspecified gastritis type  -     CBC Auto Differential; Future    9. Hypercalcemia  -     PTH, Intact; Future    10. Special screening for malignant neoplasm of prostate  -     PSA Screen; Future    11. Dry skin    Other orders  -     Semaglutide,0.25 or 0.5MG/DOS, (Ozempic, 0.25 or 0.5 MG/DOSE,) 2 MG/1.5ML solution pen-injector; Inject 0.5 mg under the skin into the appropriate area as directed 1 (One) Time Per Week.  Dispense: 1 pen; Refill: 6         I spent 41 minutes caring for Praveen on this date of service. This time includes time spent by me in the following activities:preparing for the visit, reviewing tests, obtaining and/or reviewing a separately obtained history, performing a medically appropriate examination and/or evaluation , counseling and educating the patient/family/caregiver, ordering medications, tests, or procedures, documenting information in the medical record and independently interpreting results and communicating that information with the patient/family/caregiver     I am pleased to see patient's 20-pound weight loss, which has resulted in improved diabetic control and normalizing liver enzymes.    Notify me if  he does not continue to see persistent gradual weight loss and we will discuss options to help him get back on track.  Continue current diabetic medications and monitoring.  Diabetes at goal.  He is congratulated on successful weight loss and encouraged to inntensify diabetic diet, exercise and weight loss efforts     Continue to avoid frequent or chronic use of NSAIDs due to anticoagulation therapy.   He can get in comfortable position to sleep at night and relief sciatic pain.    We will continue to monitor mild hypercalcemia.  PTH was normal last year.  I will repeat that again with next set of labs.    Continue Prilosec for GERD. Pursue weight loss.  Recent gastritis symptoms have resolved.         Continue Lipitor.  Pursue dietary efforts.   Increase exercise to raise HDL cholesterol, which is low at 28.     Address the dry skin of the bilateral feet/ankles with moisturizing lotion twice daily.      Return in six months for routine follow up visit with labs one week prior or sooner if needed.      Scribed for Dr. Angel by Vandana Gomes [facility].     Follow Up   Return in about 6 months (around 3/7/2022) for Follow up in six months with labs one week prior..  Patient was given instructions and counseling regarding his condition or for health maintenance advice. Please see specific information pulled into the AVS if appropriate.

## 2021-09-07 NOTE — PATIENT INSTRUCTIONS
Exercising to Lose Weight  Exercise is structured, repetitive physical activity to improve fitness and health. Getting regular exercise is important for everyone. It is especially important if you are overweight. Being overweight increases your risk of heart disease, stroke, diabetes, high blood pressure, and several types of cancer. Reducing your calorie intake and exercising can help you lose weight.  Exercise is usually categorized as moderate or vigorous intensity. To lose weight, most people need to do a certain amount of moderate-intensity or vigorous-intensity exercise each week.  Moderate-intensity exercise    Moderate-intensity exercise is any activity that gets you moving enough to burn at least three times more energy (calories) than if you were sitting.  Examples of moderate exercise include:  · Walking a mile in 15 minutes.  · Doing light yard work.  · Biking at an easy pace.  Most people should get at least 150 minutes (2 hours and 30 minutes) a week of moderate-intensity exercise to maintain their body weight.  Vigorous-intensity exercise  Vigorous-intensity exercise is any activity that gets you moving enough to burn at least six times more calories than if you were sitting. When you exercise at this intensity, you should be working hard enough that you are not able to carry on a conversation.  Examples of vigorous exercise include:  · Running.  · Playing a team sport, such as football, basketball, and soccer.  · Jumping rope.  Most people should get at least 75 minutes (1 hour and 15 minutes) a week of vigorous-intensity exercise to maintain their body weight.  How can exercise affect me?  When you exercise enough to burn more calories than you eat, you lose weight. Exercise also reduces body fat and builds muscle. The more muscle you have, the more calories you burn. Exercise also:  · Improves mood.  · Reduces stress and tension.  · Improves your overall fitness, flexibility, and  endurance.  · Increases bone strength.  The amount of exercise you need to lose weight depends on:  · Your age.  · The type of exercise.  · Any health conditions you have.  · Your overall physical ability.  Talk to your health care provider about how much exercise you need and what types of activities are safe for you.  What actions can I take to lose weight?  Nutrition    · Make changes to your diet as told by your health care provider or diet and nutrition specialist (dietitian). This may include:  ? Eating fewer calories.  ? Eating more protein.  ? Eating less unhealthy fats.  ? Eating a diet that includes fresh fruits and vegetables, whole grains, low-fat dairy products, and lean protein.  ? Avoiding foods with added fat, salt, and sugar.  · Drink plenty of water while you exercise to prevent dehydration or heat stroke.  Activity  · Choose an activity that you enjoy and set realistic goals. Your health care provider can help you make an exercise plan that works for you.  · Exercise at a moderate or vigorous intensity most days of the week.  ? The intensity of exercise may vary from person to person. You can tell how intense a workout is for you by paying attention to your breathing and heartbeat. Most people will notice their breathing and heartbeat get faster with more intense exercise.  · Do resistance training twice each week, such as:  ? Push-ups.  ? Sit-ups.  ? Lifting weights.  ? Using resistance bands.  · Getting short amounts of exercise can be just as helpful as long structured periods of exercise. If you have trouble finding time to exercise, try to include exercise in your daily routine.  ? Get up, stretch, and walk around every 30 minutes throughout the day.  ? Go for a walk during your lunch break.  ? Park your car farther away from your destination.  ? If you take public transportation, get off one stop early and walk the rest of the way.  ? Make phone calls while standing up and walking  around.  ? Take the stairs instead of elevators or escalators.  · Wear comfortable clothes and shoes with good support.  · Do not exercise so much that you hurt yourself, feel dizzy, or get very short of breath.  Where to find more information  · [place] Department of Health and Human Services: www.hhs.gov  · Centers for Disease Control and Prevention (CDC): www.cdc.gov  Contact a health care provider:  · Before starting a new exercise program.  · If you have questions or concerns about your weight.  · If you have a medical problem that keeps you from exercising.  Get help right away if you have any of the following while exercising:  · Injury.  · Dizziness.  · Difficulty breathing or shortness of breath that does not go away when you stop exercising.  · Chest pain.  · Rapid heartbeat.  Summary  · Being overweight increases your risk of heart disease, stroke, diabetes, high blood pressure, and several types of cancer.  · Losing weight happens when you burn more calories than you eat.  · Reducing the amount of calories you eat in addition to getting regular moderate or vigorous exercise each week helps you lose weight.  This information is not intended to replace advice given to you by your health care provider. Make sure you discuss any questions you have with your health care provider.  Document Revised: 04/15/2021 Document Reviewed: 04/15/2021  [employer] Patient Education © 2021 [employer]      Calorie Counting for Weight Loss  Calories are units of energy. Your body needs a certain number of calories from food to keep going throughout the day. When you eat or drink more calories than your body needs, your body stores the extra calories mostly as fat. When you eat or drink fewer calories than your body needs, your body burns fat to get the energy it needs.  Calorie counting means keeping track of how many calories you eat and drink each day. Calorie counting can be helpful if you need to lose weight. If you eat  fewer calories than your body needs, you should lose weight. Ask your health care provider what a healthy weight is for you.  For calorie counting to work, you will need to eat the right number of calories each day to lose a healthy amount of weight per week. A dietitian can help you figure out how many calories you need in a day and will suggest ways to reach your calorie goal.  · A healthy amount of weight to lose each week is usually 1-2 lb (0.5-0.9 kg). This usually means that your daily calorie intake should be reduced by 500-750 calories.  · Eating 1,200-1,500 calories a day can help most women lose weight.  · Eating 1,500-1,800 calories a day can help most men lose weight.  What do I need to know about calorie counting?  Work with your health care provider or dietitian to determine how many calories you should get each day. To meet your daily calorie goal, you will need to:  · Find out how many calories are in each food that you would like to eat. Try to do this before you eat.  · Decide how much of the food you plan to eat.  · Keep a food log. Do this by writing down what you ate and how many calories it had.  To successfully lose weight, it is important to balance calorie counting with a healthy lifestyle that includes regular activity.  Where do I find calorie information?    The number of calories in a food can be found on a Nutrition Facts label. If a food does not have a Nutrition Facts label, try to look up the calories online or ask your dietitian for help.  Remember that calories are listed per serving. If you choose to have more than one serving of a food, you will have to multiply the calories per serving by the number of servings you plan to eat. For example, the label on a package of bread might say that a serving size is 1 slice and that there are 90 calories in a serving. If you eat 1 slice, you will have eaten 90 calories. If you eat 2 slices, you will have eaten 180 calories.  How do I keep a  food log?  After each time that you eat, record the following in your food log as soon as possible:  · What you ate. Be sure to include toppings, sauces, and other extras on the food.  · How much you ate. This can be measured in cups, ounces, or number of items.  · How many calories were in each food and drink.  · The total number of calories in the food you ate.  Keep your food log near you, such as in a pocket-sized notebook or on an bird or website on your mobile phone. Some programs will calculate calories for you and show you how many calories you have left to meet your daily goal.  What are some portion-control tips?  · Know how many calories are in a serving. This will help you know how many servings you can have of a certain food.  · Use a measuring cup to measure serving sizes. You could also try weighing out portions on a kitchen scale. With time, you will be able to estimate serving sizes for some foods.  · Take time to put servings of different foods on your favorite plates or in your favorite bowls and cups so you know what a serving looks like.  · Try not to eat straight from a food's packaging, such as from a bag or box. Eating straight from the package makes it hard to see how much you are eating and can lead to overeating. Put the amount you would like to eat in a cup or on a plate to make sure you are eating the right portion.  · Use smaller plates, glasses, and bowls for smaller portions and to prevent overeating.  · Try not to multitask. For example, avoid watching TV or using your computer while eating. If it is time to eat, sit down at a table and enjoy your food. This will help you recognize when you are full. It will also help you be more mindful of what and how much you are eating.  What are tips for following this plan?  Reading food labels  · Check the calorie count compared with the serving size. The serving size may be smaller than what you are used to eating.  · Check the source of the  calories. Try to choose foods that are high in protein, fiber, and vitamins, and low in saturated fat, trans fat, and sodium.  Shopping  · Read nutrition labels while you shop. This will help you make healthy decisions about which foods to buy.  · Pay attention to nutrition labels for low-fat or fat-free foods. These foods sometimes have the same number of calories or more calories than the full-fat versions. They also often have added sugar, starch, or salt to make up for flavor that was removed with the fat.  · Make a grocery list of lower-calorie foods and stick to it.  Cooking  · Try to cook your favorite foods in a healthier way. For example, try baking instead of frying.  · Use low-fat dairy products.  Meal planning  · Use more fruits and vegetables. One-half of your plate should be fruits and vegetables.  · Include lean proteins, such as chicken, turkey, and fish.  Lifestyle  Each week, aim to do one of the following:  · 150 minutes of moderate exercise, such as walking.  · 75 minutes of vigorous exercise, such as running.  General information  · Know how many calories are in the foods you eat most often. This will help you calculate calorie counts faster.  · Find a way of tracking calories that works for you. Get creative. Try different apps or programs if writing down calories does not work for you.  What foods should I eat?    · Eat nutritious foods. It is better to have a nutritious, high-calorie food, such as an avocado, than a food with few nutrients, such as a bag of potato chips.  · Use your calories on foods and drinks that will fill you up and will not leave you hungry soon after eating.  ? Examples of foods that fill you up are nuts and nut butters, vegetables, lean proteins, and high-fiber foods such as whole grains. High-fiber foods are foods with more than 5 g of fiber per serving.  · Pay attention to calories in drinks. Low-calorie drinks include water and unsweetened drinks.  The items listed  above may not be a complete list of foods and beverages you can eat. Contact a dietitian for more information.  What foods should I limit?  Limit foods or drinks that are not good sources of vitamins, minerals, or protein or that are high in unhealthy fats. These include:  · Candy.  · Other sweets.  · Sodas, specialty coffee drinks, alcohol, and juice.  The items listed above may not be a complete list of foods and beverages you should avoid. Contact a dietitian for more information.  How do I count calories when eating out?  · Pay attention to portions. Often, portions are much larger when eating out. Try these tips to keep portions smaller:  ? Consider sharing a meal instead of getting your own.  ? If you get your own meal, eat only half of it. Before you start eating, ask for a container and put half of your meal into it.  ? When available, consider ordering smaller portions from the menu instead of full portions.  · Pay attention to your food and drink choices. Knowing the way food is cooked and what is included with the meal can help you eat fewer calories.  ? If calories are listed on the menu, choose the lower-calorie options.  ? Choose dishes that include vegetables, fruits, whole grains, low-fat dairy products, and lean proteins.  ? Choose items that are boiled, broiled, grilled, or steamed. Avoid items that are buttered, battered, fried, or served with cream sauce. Items labeled as crispy are usually fried, unless stated otherwise.  ? Choose water, low-fat milk, unsweetened iced tea, or other drinks without added sugar. If you want an alcoholic beverage, choose a lower-calorie option, such as a glass of wine or light beer.  ? Ask for dressings, sauces, and syrups on the side. These are usually high in calories, so you should limit the amount you eat.  ? If you want a salad, choose a garden salad and ask for grilled meats. Avoid extra toppings such as chisholm, cheese, or fried items. Ask for the dressing on  the side, or ask for olive oil and vinegar or lemon to use as dressing.  · Estimate how many servings of a food you are given. Knowing serving sizes will help you be aware of how much food you are eating at restaurants.  Where to find more information  · Centers for Disease Control and Prevention: www.cdc.gov  · [place] Department of Agriculture: myplate.gov  Summary  · Calorie counting means keeping track of how many calories you eat and drink each day. If you eat fewer calories than your body needs, you should lose weight.  · A healthy amount of weight to lose per week is usually 1-2 lb (0.5-0.9 kg). This usually means reducing your daily calorie intake by 500-750 calories.  · The number of calories in a food can be found on a Nutrition Facts label. If a food does not have a Nutrition Facts label, try to look up the calories online or ask your dietitian for help.  · Use smaller plates, glasses, and bowls for smaller portions and to prevent overeating.  · Use your calories on foods and drinks that will fill you up and not leave you hungry shortly after a meal.  This information is not intended to replace advice given to you by your health care provider. Make sure you discuss any questions you have with your health care provider.  Document Revised: 01/28/2021 Document Reviewed: 01/28/2021  [employer] Patient Education © 2021 [employer]

## 2021-09-15 ENCOUNTER — LAB (OUTPATIENT)
Dept: LAB | Facility: OTHER | Age: 56
End: 2021-09-15

## 2021-09-15 PROCEDURE — U0004 COV-19 TEST NON-CDC HGH THRU: HCPCS | Performed by: NURSE PRACTITIONER

## 2021-11-19 RX ORDER — OMEPRAZOLE 40 MG/1
CAPSULE, DELAYED RELEASE ORAL
Qty: 30 CAPSULE | Refills: 11 | Status: SHIPPED | OUTPATIENT
Start: 2021-11-19 | End: 2022-10-04 | Stop reason: ALTCHOICE

## 2021-12-14 ENCOUNTER — LAB (OUTPATIENT)
Dept: LAB | Facility: OTHER | Age: 56
End: 2021-12-14

## 2021-12-14 PROCEDURE — 87635 SARS-COV-2 COVID-19 AMP PRB: CPT | Performed by: PHYSICIAN ASSISTANT

## 2022-01-08 PROCEDURE — 87635 SARS-COV-2 COVID-19 AMP PRB: CPT | Performed by: EMERGENCY MEDICINE

## 2022-02-07 RX ORDER — SEMAGLUTIDE 1.34 MG/ML
0.5 INJECTION, SOLUTION SUBCUTANEOUS WEEKLY
Qty: 1 PEN | Refills: 0 | Status: SHIPPED | OUTPATIENT
Start: 2022-02-07 | End: 2022-04-08 | Stop reason: SDUPTHER

## 2022-03-07 ENCOUNTER — LAB (OUTPATIENT)
Dept: LAB | Facility: OTHER | Age: 57
End: 2022-03-07

## 2022-03-07 DIAGNOSIS — E78.2 MIXED HYPERLIPIDEMIA: Chronic | ICD-10-CM

## 2022-03-07 DIAGNOSIS — I48.0 PAROXYSMAL ATRIAL FIBRILLATION: Chronic | ICD-10-CM

## 2022-03-07 DIAGNOSIS — Z12.5 SPECIAL SCREENING FOR MALIGNANT NEOPLASM OF PROSTATE: ICD-10-CM

## 2022-03-07 DIAGNOSIS — K29.00 ACUTE GASTRITIS WITHOUT HEMORRHAGE, UNSPECIFIED GASTRITIS TYPE: ICD-10-CM

## 2022-03-07 DIAGNOSIS — E83.52 HYPERCALCEMIA: ICD-10-CM

## 2022-03-07 DIAGNOSIS — I10 ESSENTIAL HYPERTENSION: Chronic | ICD-10-CM

## 2022-03-07 DIAGNOSIS — Z79.01 CHRONIC ANTICOAGULATION: Chronic | ICD-10-CM

## 2022-03-07 DIAGNOSIS — E11.9 TYPE 2 DIABETES MELLITUS WITHOUT COMPLICATION, WITHOUT LONG-TERM CURRENT USE OF INSULIN: Chronic | ICD-10-CM

## 2022-03-07 LAB
ALBUMIN SERPL-MCNC: 4.2 G/DL (ref 3.5–5)
ALBUMIN UR-MCNC: <1.2 MG/DL
ALBUMIN/GLOB SERPL: 1.3 G/DL (ref 1.1–1.8)
ALP SERPL-CCNC: 93 U/L (ref 38–126)
ALT SERPL W P-5'-P-CCNC: 34 U/L
ANION GAP SERPL CALCULATED.3IONS-SCNC: 6 MMOL/L (ref 5–15)
AST SERPL-CCNC: 32 U/L (ref 17–59)
BASOPHILS # BLD AUTO: 0.06 10*3/MM3 (ref 0–0.2)
BASOPHILS NFR BLD AUTO: 1 % (ref 0–1.5)
BILIRUB SERPL-MCNC: 0.3 MG/DL (ref 0.2–1.3)
BUN SERPL-MCNC: 16 MG/DL (ref 7–23)
BUN/CREAT SERPL: 16.2 (ref 7–25)
CALCIUM SPEC-SCNC: 10.1 MG/DL (ref 8.4–10.2)
CHLORIDE SERPL-SCNC: 102 MMOL/L (ref 101–112)
CHOLEST SERPL-MCNC: 125 MG/DL (ref 150–200)
CO2 SERPL-SCNC: 29 MMOL/L (ref 22–30)
CREAT SERPL-MCNC: 0.99 MG/DL (ref 0.7–1.3)
CREAT UR-MCNC: 86.4 MG/DL
DEPRECATED RDW RBC AUTO: 48.1 FL (ref 37–54)
EGFRCR SERPLBLD CKD-EPI 2021: 90 ML/MIN/1.73
EOSINOPHIL # BLD AUTO: 0.13 10*3/MM3 (ref 0–0.4)
EOSINOPHIL NFR BLD AUTO: 2.1 % (ref 0.3–6.2)
ERYTHROCYTE [DISTWIDTH] IN BLOOD BY AUTOMATED COUNT: 14.6 % (ref 12.3–15.4)
GLOBULIN UR ELPH-MCNC: 3.3 GM/DL (ref 2.3–3.5)
GLUCOSE SERPL-MCNC: 98 MG/DL (ref 70–99)
HBA1C MFR BLD: 6 % (ref 4.8–5.6)
HCT VFR BLD AUTO: 41.1 % (ref 37.5–51)
HDLC SERPL-MCNC: 38 MG/DL (ref 40–59)
HGB BLD-MCNC: 13.5 G/DL (ref 13–17.7)
LDLC SERPL CALC-MCNC: 71 MG/DL
LDLC/HDLC SERPL: 1.86 {RATIO} (ref 0–3.55)
LYMPHOCYTES # BLD AUTO: 2.92 10*3/MM3 (ref 0.7–3.1)
LYMPHOCYTES NFR BLD AUTO: 47.6 % (ref 19.6–45.3)
MCH RBC QN AUTO: 30.2 PG (ref 26.6–33)
MCHC RBC AUTO-ENTMCNC: 32.8 G/DL (ref 31.5–35.7)
MCV RBC AUTO: 91.9 FL (ref 79–97)
MICROALBUMIN/CREAT UR: NORMAL MG/G{CREAT}
MONOCYTES # BLD AUTO: 0.72 10*3/MM3 (ref 0.1–0.9)
MONOCYTES NFR BLD AUTO: 11.7 % (ref 5–12)
NEUTROPHILS NFR BLD AUTO: 2.31 10*3/MM3 (ref 1.7–7)
NEUTROPHILS NFR BLD AUTO: 37.6 % (ref 42.7–76)
PLATELET # BLD AUTO: 253 10*3/MM3 (ref 140–450)
PMV BLD AUTO: 8.2 FL (ref 6–12)
POTASSIUM SERPL-SCNC: 4 MMOL/L (ref 3.4–5)
PROT SERPL-MCNC: 7.5 G/DL (ref 6.3–8.6)
PSA SERPL-MCNC: 0.07 NG/ML (ref 0–4)
PTH-INTACT SERPL-MCNC: 32.9 PG/ML (ref 15–65)
RBC # BLD AUTO: 4.47 10*6/MM3 (ref 4.14–5.8)
SODIUM SERPL-SCNC: 137 MMOL/L (ref 137–145)
TRIGL SERPL-MCNC: 81 MG/DL
VIT B12 BLD-MCNC: 445 PG/ML (ref 211–946)
VLDLC SERPL-MCNC: 16 MG/DL (ref 5–40)
WBC NRBC COR # BLD: 6.14 10*3/MM3 (ref 3.4–10.8)

## 2022-03-07 PROCEDURE — G0103 PSA SCREENING: HCPCS | Performed by: INTERNAL MEDICINE

## 2022-03-07 PROCEDURE — 36415 COLL VENOUS BLD VENIPUNCTURE: CPT | Performed by: INTERNAL MEDICINE

## 2022-03-07 PROCEDURE — 80053 COMPREHEN METABOLIC PANEL: CPT | Performed by: INTERNAL MEDICINE

## 2022-03-07 PROCEDURE — 85025 COMPLETE CBC W/AUTO DIFF WBC: CPT | Performed by: INTERNAL MEDICINE

## 2022-03-07 PROCEDURE — 83036 HEMOGLOBIN GLYCOSYLATED A1C: CPT | Performed by: INTERNAL MEDICINE

## 2022-03-07 PROCEDURE — 82570 ASSAY OF URINE CREATININE: CPT | Performed by: INTERNAL MEDICINE

## 2022-03-07 PROCEDURE — 82607 VITAMIN B-12: CPT | Performed by: INTERNAL MEDICINE

## 2022-03-07 PROCEDURE — 82043 UR ALBUMIN QUANTITATIVE: CPT | Performed by: INTERNAL MEDICINE

## 2022-03-07 PROCEDURE — 83970 ASSAY OF PARATHORMONE: CPT | Performed by: INTERNAL MEDICINE

## 2022-03-07 PROCEDURE — 80061 LIPID PANEL: CPT | Performed by: INTERNAL MEDICINE

## 2022-03-14 ENCOUNTER — OFFICE VISIT (OUTPATIENT)
Dept: FAMILY MEDICINE CLINIC | Facility: CLINIC | Age: 57
End: 2022-03-14

## 2022-03-14 VITALS
SYSTOLIC BLOOD PRESSURE: 156 MMHG | DIASTOLIC BLOOD PRESSURE: 82 MMHG | TEMPERATURE: 97.8 F | HEART RATE: 84 BPM | HEIGHT: 78 IN | WEIGHT: 274 LBS | BODY MASS INDEX: 31.7 KG/M2

## 2022-03-14 DIAGNOSIS — I50.22 CHRONIC SYSTOLIC CONGESTIVE HEART FAILURE: Chronic | ICD-10-CM

## 2022-03-14 DIAGNOSIS — F17.200 TOBACCO DEPENDENCE SYNDROME: Chronic | ICD-10-CM

## 2022-03-14 DIAGNOSIS — E11.9 TYPE 2 DIABETES MELLITUS WITHOUT COMPLICATION, WITHOUT LONG-TERM CURRENT USE OF INSULIN: Primary | Chronic | ICD-10-CM

## 2022-03-14 DIAGNOSIS — R53.83 FATIGUE, UNSPECIFIED TYPE: ICD-10-CM

## 2022-03-14 DIAGNOSIS — I10 ESSENTIAL HYPERTENSION: Chronic | ICD-10-CM

## 2022-03-14 DIAGNOSIS — K21.9 GASTROESOPHAGEAL REFLUX DISEASE WITHOUT ESOPHAGITIS: Chronic | ICD-10-CM

## 2022-03-14 DIAGNOSIS — I48.0 PAROXYSMAL ATRIAL FIBRILLATION: Chronic | ICD-10-CM

## 2022-03-14 DIAGNOSIS — E66.09 CLASS 1 OBESITY DUE TO EXCESS CALORIES WITH SERIOUS COMORBIDITY AND BODY MASS INDEX (BMI) OF 31.0 TO 31.9 IN ADULT: Chronic | ICD-10-CM

## 2022-03-14 DIAGNOSIS — Z23 NEED FOR DIPHTHERIA-TETANUS-PERTUSSIS (TDAP) VACCINE: ICD-10-CM

## 2022-03-14 DIAGNOSIS — Z23 NEED FOR TDAP VACCINATION: ICD-10-CM

## 2022-03-14 DIAGNOSIS — E11.69 HYPERLIPIDEMIA ASSOCIATED WITH TYPE 2 DIABETES MELLITUS: Chronic | ICD-10-CM

## 2022-03-14 DIAGNOSIS — E78.5 HYPERLIPIDEMIA ASSOCIATED WITH TYPE 2 DIABETES MELLITUS: Chronic | ICD-10-CM

## 2022-03-14 DIAGNOSIS — N52.03 COMBINED ARTERIAL INSUFFICIENCY AND CORPORO-VENOUS OCCLUSIVE ERECTILE DYSFUNCTION: Chronic | ICD-10-CM

## 2022-03-14 PROBLEM — Z72.0 TOBACCO ABUSE: Status: ACTIVE | Noted: 2022-03-14

## 2022-03-14 PROCEDURE — 90471 IMMUNIZATION ADMIN: CPT | Performed by: INTERNAL MEDICINE

## 2022-03-14 PROCEDURE — 90715 TDAP VACCINE 7 YRS/> IM: CPT | Performed by: INTERNAL MEDICINE

## 2022-03-14 PROCEDURE — 99214 OFFICE O/P EST MOD 30 MIN: CPT | Performed by: INTERNAL MEDICINE

## 2022-03-14 RX ORDER — DILTIAZEM HYDROCHLORIDE 360 MG/1
360 CAPSULE, EXTENDED RELEASE ORAL DAILY
COMMUNITY

## 2022-03-14 RX ORDER — ATORVASTATIN CALCIUM 20 MG/1
TABLET, FILM COATED ORAL
COMMUNITY
Start: 2022-01-18 | End: 2022-09-21 | Stop reason: SDUPTHER

## 2022-03-14 RX ORDER — SILDENAFIL 100 MG/1
TABLET, FILM COATED ORAL
Qty: 15 TABLET | Refills: 11 | Status: SHIPPED | OUTPATIENT
Start: 2022-03-14

## 2022-03-14 NOTE — PROGRESS NOTES
Chief Complaint  Follow-up (6 month), Fatigue (Stays tired and wants to talk about testosterone levels ), and Immunizations (Tdap given in right deltoid and tolerated procedure well without adverse reactions)    Subjective          History of Present Illness     Praveen Noonan presents to the clinic for 6-month follow up on multiple complex chronic medical issues including type 2 diabetes mellitus, hyperlipidemia, hypertension, tobacco dependence syndrome, GERD, and mild chronic systolic CHF. His cardiologist is Dr. Hillman who follows his paroxysmal a-fib, for which he continues on Eliquis.  He has nitroglycerin, but has not had to use any.      Patient continues to smoke cigarettes.  He had lung cancer screening CT 09/2021 revealing no suspicious pulmonary nodules for lung malignancy or acute intrathoracic abnormality, although, there was atelectasis in the right lung.  I advised the patient of the risks in continuing to use tobacco products.  Patient is urged to stop smoking and never start again.         He is reporting increased fatigue and ED, which has been going on for a while. He is able to get erection, but not able to sustain an erection.   He is asking about checking testosterone levels  I explained his diuretic and calcium channel blocker can be playing a role in the ER and explained he would not be a good candidate for testosterone replacement.      Weight is up 4 pounds in the past six months after 20-pound weight loss.  He feels he will be able to get back on track with dietary efforts with the summer weather.  He gets 12,000 to 14,000 steps daily with his employment.  Labs reveal excellent diabetes control with metformin and weekly Ozempic.     BP above goal in the office at 156/82.  BP was 120/60 with his cardiology visit lat week.  He reports systolic 125 to 130 with home monitoring.      Patient is due TDAP, which is given in the office today.     The patient's relevant past medical, surgical,  "and social history was reviewed in Epic.   Lab results are reviewed with the patient today. CBC unremarkable.  Fasting glucose 98.  A1c 6.0.  Cholesterol at goal, although, HDL is low at 38.  B-12 level at goal.   We checked PTH, which is normal.  Normal renal and liver functino.  PSA is unremarkable with no evidence of prostate cancer.        Objective   Vital Signs:   /82   Pulse 84   Temp 97.8 °F (36.6 °C) (Tympanic)   Ht 198.1 cm (78\")   Wt 124 kg (274 lb)   BMI 31.66 kg/m²       Physical Exam  Vitals reviewed.   Constitutional:       General: He is not in acute distress.     Appearance: He is well-developed.      Comments: Pleasant male, obese.    HENT:      Head: Normocephalic and atraumatic.      Nose:      Right Sinus: No maxillary sinus tenderness or frontal sinus tenderness.      Left Sinus: No maxillary sinus tenderness or frontal sinus tenderness.      Mouth/Throat:      Mouth: No oral lesions.      Pharynx: Uvula midline.      Tonsils: No tonsillar exudate.   Eyes:      Conjunctiva/sclera: Conjunctivae normal.      Pupils: Pupils are equal, round, and reactive to light.   Neck:      Thyroid: No thyroid mass or thyromegaly.      Vascular: No carotid bruit or JVD.      Trachea: Trachea normal. No tracheal deviation.   Cardiovascular:      Rate and Rhythm: Normal rate and regular rhythm.  No extrasystoles are present.     Chest Wall: PMI is not displaced.      Heart sounds: Normal heart sounds. No murmur heard.  Pulmonary:      Effort: Pulmonary effort is normal. No accessory muscle usage or respiratory distress.      Breath sounds: Normal breath sounds. No decreased breath sounds, wheezing, rhonchi or rales.      Comments: Chronic lung sounds.   Abdominal:      General: Bowel sounds are normal. There is no distension.      Palpations: Abdomen is soft.      Tenderness: There is no abdominal tenderness.      Comments: Obese abdomen limits exam.    Musculoskeletal:      Cervical back: Neck supple. "   Feet:      Comments: Pulses 1+ bilateral ankles.    Lymphadenopathy:      Cervical: No cervical adenopathy.   Skin:     General: Skin is warm and dry.      Findings: No rash.      Nails: There is no clubbing.   Neurological:      Mental Status: He is alert and oriented to person, place, and time.      Cranial Nerves: No cranial nerve deficit.      Coordination: Coordination normal.   Psychiatric:         Speech: Speech normal.         Behavior: Behavior normal.         Thought Content: Thought content normal.         Judgment: Judgment normal.            Result Review :     CMP    CMP 8/31/21 3/7/22   Glucose 100 (A) 98   BUN 16 16   Creatinine 1.13 0.99   eGFR African Am 82    Sodium 137 137   Potassium 4.2 4.0   Chloride 103 102   Calcium 10.6 (A) 10.1   Albumin 4.20 4.20   Total Bilirubin 0.3 0.3   Alkaline Phosphatase 86 93   AST (SGOT) 33 32   ALT (SGPT) 41 34   (A) Abnormal value            CBC w/diff    CBC w/Diff 8/31/21 3/7/22   WBC 7.20 6.14   RBC 4.27 4.47   Hemoglobin 13.1 13.5   Hematocrit 38.8 41.1   MCV 90.9 91.9   MCH 30.7 30.2   MCHC 33.8 32.8   RDW 14.2 14.6   Platelets 258 253   Neutrophil Rel % 37.2 (A) 37.6 (A)   Lymphocyte Rel % 49.2 (A) 47.6 (A)   Monocyte Rel % 10.4 11.7   Eosinophil Rel % 2.6 2.1   Basophil Rel % 0.6 1.0   (A) Abnormal value            Lipid Panel    Lipid Panel 8/31/21 3/7/22   Total Cholesterol 120 (A) 125 (A)   Triglycerides 106 81   HDL Cholesterol 28 (A) 38 (A)   VLDL Cholesterol 20 16   LDL Cholesterol  72 71   LDL/HDL Ratio 2.53 1.86   (A) Abnormal value            TSH    TSH 8/31/21   TSH 1.550           A1C Last 3 Results    HGBA1C Last 3 Results 8/31/21 3/7/22   Hemoglobin A1C 6.30 (A) 6.00 (A)   (A) Abnormal value            PSA    PSA 3/7/22   PSA 0.075                     Assessment and Plan    Diagnoses and all orders for this visit:    1. Type 2 diabetes mellitus without complication, without long-term current use of insulin (CMS/[facility]) - diagnosed 9/2018  (Primary)  -     CBC Auto Differential; Future  -     Comprehensive Metabolic Panel; Future  -     Hemoglobin A1c; Future  -     Lipid Panel; Future  -     TSH; Future    2. Need for Tdap vaccination    3. Essential hypertension  -     Comprehensive Metabolic Panel; Future    4. Hyperlipidemia associated with type 2 diabetes mellitus (HCC)  -     Lipid Panel; Future    5. Paroxysmal atrial fibrillation (HCC)  -     Comprehensive Metabolic Panel; Future    6. Chronic systolic congestive heart failure (HCC)  -     BNP; Future    7. Class 1 obesity due to excess calories with serious comorbidity and body mass index (BMI) of 31.0 to 31.9 in adult    8. Gastroesophageal reflux disease without esophagitis  -     CBC Auto Differential; Future    9. Tobacco dependence syndrome - 1ppd  -     CBC Auto Differential; Future    10. Combined arterial insufficiency and corporo-venous occlusive erectile dysfunction  -     Testosterone, Free, Total; Future    11. Fatigue, unspecified type  -     CBC Auto Differential; Future  -     Testosterone, Free, Total; Future  -     TSH; Future    12. Need for diphtheria-tetanus-pertussis (Tdap) vaccine    Other orders  -     sildenafil (Viagra) 100 MG tablet; 0.5-1 tab po 1 hour prior to sex  Dispense: 15 tablet; Refill: 11  -     Tdap Vaccine Greater Than or Equal To 6yo IM             After informed verbal consent, patient is given TDAP.  He tolerated well.      Diabetes excellent control.  Continue the metformin and weekly Ozempic.  Recommended 250-calorie daily deficit to achieve 2-pound monthly weight loss with a gradual 15-pound weight loss goal    For ED, I sent a prescription for Viagra 100 mg to take one 45 minutes to an hour prior to sexual intercourse. We discussed contraindications and cardiovascular risk with taking nitroglycerin with Viagra.  He verbalizes understanding.      Continue follow up visits and cardiovascular management plan as given by Dr. Hillman, cardiologist.  I  advised the patient of the risks in continuing to use tobacco products.  Patient is urged to stop smoking and never start again. Continue the Eliquis.  Avoid chronic NSAIDs with the anticoagulation therapy.       We continue to monitor hypercalcemia.  PTH remains normal limits.      Continue Prilosec for GERD.    Return in six months for routine follow up with fasting labs one week prior.  We will add testosterone labs with next set of labs.     Scribed for Dr. Angel by Vandana Gomes [facility].     Follow Up   Return in about 6 months (around 9/14/2022) for Follow up in six months with labs one week prior..  Patient was given instructions and counseling regarding his condition or for health maintenance advice. Please see specific information pulled into the AVS if appropriate.

## 2022-03-15 PROBLEM — E78.5 HYPERLIPIDEMIA ASSOCIATED WITH TYPE 2 DIABETES MELLITUS: Chronic | Status: ACTIVE | Noted: 2022-03-15

## 2022-03-15 PROBLEM — E11.69 HYPERLIPIDEMIA ASSOCIATED WITH TYPE 2 DIABETES MELLITUS: Chronic | Status: ACTIVE | Noted: 2022-03-15

## 2022-03-15 PROBLEM — N52.03 COMBINED ARTERIAL INSUFFICIENCY AND CORPORO-VENOUS OCCLUSIVE ERECTILE DYSFUNCTION: Chronic | Status: ACTIVE | Noted: 2017-08-02

## 2022-03-15 NOTE — PATIENT INSTRUCTIONS
IF YOU SMOKE OR USE TOBACCO PLEASE READ THE FOLLOWING:  Why is smoking bad for me?  Smoking increases the risk of heart disease, lung disease, vascular disease, stroke, and cancer. If you smoke, STOP!

## 2022-04-08 RX ORDER — SEMAGLUTIDE 1.34 MG/ML
0.5 INJECTION, SOLUTION SUBCUTANEOUS WEEKLY
Qty: 1 PEN | Refills: 0 | Status: SHIPPED | OUTPATIENT
Start: 2022-04-08 | End: 2022-05-01 | Stop reason: SDUPTHER

## 2022-05-02 RX ORDER — SEMAGLUTIDE 1.34 MG/ML
0.5 INJECTION, SOLUTION SUBCUTANEOUS WEEKLY
Qty: 1 PEN | Refills: 0 | Status: SHIPPED | OUTPATIENT
Start: 2022-05-02 | End: 2022-06-05 | Stop reason: SDUPTHER

## 2022-06-06 RX ORDER — SEMAGLUTIDE 1.34 MG/ML
0.5 INJECTION, SOLUTION SUBCUTANEOUS WEEKLY
Qty: 1 PEN | Refills: 0 | Status: SHIPPED | OUTPATIENT
Start: 2022-06-06 | End: 2022-09-21 | Stop reason: DRUGHIGH

## 2022-09-14 ENCOUNTER — LAB (OUTPATIENT)
Dept: LAB | Facility: OTHER | Age: 57
End: 2022-09-14

## 2022-09-14 DIAGNOSIS — I10 ESSENTIAL HYPERTENSION: Chronic | ICD-10-CM

## 2022-09-14 DIAGNOSIS — I50.22 CHRONIC SYSTOLIC CONGESTIVE HEART FAILURE: Chronic | ICD-10-CM

## 2022-09-14 DIAGNOSIS — E11.9 TYPE 2 DIABETES MELLITUS WITHOUT COMPLICATION, WITHOUT LONG-TERM CURRENT USE OF INSULIN: Chronic | ICD-10-CM

## 2022-09-14 DIAGNOSIS — R53.83 FATIGUE, UNSPECIFIED TYPE: ICD-10-CM

## 2022-09-14 DIAGNOSIS — N52.03 COMBINED ARTERIAL INSUFFICIENCY AND CORPORO-VENOUS OCCLUSIVE ERECTILE DYSFUNCTION: Chronic | ICD-10-CM

## 2022-09-14 DIAGNOSIS — F17.200 TOBACCO DEPENDENCE SYNDROME: Chronic | ICD-10-CM

## 2022-09-14 DIAGNOSIS — K21.9 GASTROESOPHAGEAL REFLUX DISEASE WITHOUT ESOPHAGITIS: Chronic | ICD-10-CM

## 2022-09-14 DIAGNOSIS — E11.69 HYPERLIPIDEMIA ASSOCIATED WITH TYPE 2 DIABETES MELLITUS: Chronic | ICD-10-CM

## 2022-09-14 DIAGNOSIS — E78.5 HYPERLIPIDEMIA ASSOCIATED WITH TYPE 2 DIABETES MELLITUS: Chronic | ICD-10-CM

## 2022-09-14 DIAGNOSIS — I48.0 PAROXYSMAL ATRIAL FIBRILLATION: Chronic | ICD-10-CM

## 2022-09-14 LAB
ALBUMIN SERPL-MCNC: 4.2 G/DL (ref 3.5–5)
ALBUMIN/GLOB SERPL: 1.2 G/DL (ref 1.1–1.8)
ALP SERPL-CCNC: 106 U/L (ref 38–126)
ALT SERPL W P-5'-P-CCNC: 48 U/L
ANION GAP SERPL CALCULATED.3IONS-SCNC: 6 MMOL/L (ref 5–15)
AST SERPL-CCNC: 41 U/L (ref 17–59)
BASOPHILS # BLD AUTO: 0.05 10*3/MM3 (ref 0–0.2)
BASOPHILS NFR BLD AUTO: 0.9 % (ref 0–1.5)
BILIRUB SERPL-MCNC: 0.4 MG/DL (ref 0.2–1.3)
BNP SERPL-MCNC: <5 PG/ML (ref 0–100)
BUN SERPL-MCNC: 16 MG/DL (ref 7–23)
BUN/CREAT SERPL: 15.1 (ref 7–25)
CALCIUM SPEC-SCNC: 10.1 MG/DL (ref 8.4–10.2)
CHLORIDE SERPL-SCNC: 105 MMOL/L (ref 101–112)
CHOLEST SERPL-MCNC: 140 MG/DL (ref 150–200)
CO2 SERPL-SCNC: 27 MMOL/L (ref 22–30)
CREAT SERPL-MCNC: 1.06 MG/DL (ref 0.7–1.3)
DEPRECATED RDW RBC AUTO: 47 FL (ref 37–54)
EGFRCR SERPLBLD CKD-EPI 2021: 82.9 ML/MIN/1.73
EOSINOPHIL # BLD AUTO: 0.13 10*3/MM3 (ref 0–0.4)
EOSINOPHIL NFR BLD AUTO: 2.2 % (ref 0.3–6.2)
ERYTHROCYTE [DISTWIDTH] IN BLOOD BY AUTOMATED COUNT: 14.7 % (ref 12.3–15.4)
GLOBULIN UR ELPH-MCNC: 3.5 GM/DL (ref 2.3–3.5)
GLUCOSE SERPL-MCNC: 107 MG/DL (ref 70–99)
HBA1C MFR BLD: 6.3 % (ref 4.8–5.6)
HCT VFR BLD AUTO: 41.6 % (ref 37.5–51)
HDLC SERPL-MCNC: 30 MG/DL (ref 40–59)
HGB BLD-MCNC: 13.6 G/DL (ref 13–17.7)
LDLC SERPL CALC-MCNC: 85 MG/DL
LDLC/HDLC SERPL: 2.73 {RATIO} (ref 0–3.55)
LYMPHOCYTES # BLD AUTO: 3.03 10*3/MM3 (ref 0.7–3.1)
LYMPHOCYTES NFR BLD AUTO: 51.7 % (ref 19.6–45.3)
MCH RBC QN AUTO: 29.3 PG (ref 26.6–33)
MCHC RBC AUTO-ENTMCNC: 32.7 G/DL (ref 31.5–35.7)
MCV RBC AUTO: 89.7 FL (ref 79–97)
MONOCYTES # BLD AUTO: 0.76 10*3/MM3 (ref 0.1–0.9)
MONOCYTES NFR BLD AUTO: 13 % (ref 5–12)
NEUTROPHILS NFR BLD AUTO: 1.89 10*3/MM3 (ref 1.7–7)
NEUTROPHILS NFR BLD AUTO: 32.2 % (ref 42.7–76)
PLATELET # BLD AUTO: 278 10*3/MM3 (ref 140–450)
PMV BLD AUTO: 8.5 FL (ref 6–12)
POTASSIUM SERPL-SCNC: 4.2 MMOL/L (ref 3.4–5)
PROT SERPL-MCNC: 7.7 G/DL (ref 6.3–8.6)
RBC # BLD AUTO: 4.64 10*6/MM3 (ref 4.14–5.8)
SODIUM SERPL-SCNC: 138 MMOL/L (ref 137–145)
TRIGL SERPL-MCNC: 141 MG/DL
TSH SERPL DL<=0.05 MIU/L-ACNC: 1.54 UIU/ML (ref 0.27–4.2)
VLDLC SERPL-MCNC: 25 MG/DL (ref 5–40)
WBC NRBC COR # BLD: 5.86 10*3/MM3 (ref 3.4–10.8)

## 2022-09-14 PROCEDURE — 36415 COLL VENOUS BLD VENIPUNCTURE: CPT | Performed by: INTERNAL MEDICINE

## 2022-09-14 PROCEDURE — 80050 GENERAL HEALTH PANEL: CPT | Performed by: INTERNAL MEDICINE

## 2022-09-14 PROCEDURE — 80061 LIPID PANEL: CPT | Performed by: INTERNAL MEDICINE

## 2022-09-14 PROCEDURE — 83036 HEMOGLOBIN GLYCOSYLATED A1C: CPT | Performed by: INTERNAL MEDICINE

## 2022-09-14 PROCEDURE — 84402 ASSAY OF FREE TESTOSTERONE: CPT | Performed by: INTERNAL MEDICINE

## 2022-09-14 PROCEDURE — 84403 ASSAY OF TOTAL TESTOSTERONE: CPT | Performed by: INTERNAL MEDICINE

## 2022-09-14 PROCEDURE — 83880 ASSAY OF NATRIURETIC PEPTIDE: CPT | Performed by: INTERNAL MEDICINE

## 2022-09-18 LAB
TESTOST FREE SERPL-MCNC: 0.9 PG/ML (ref 7.2–24)
TESTOST SERPL-MCNC: 35 NG/DL (ref 264–916)

## 2022-09-21 ENCOUNTER — OFFICE VISIT (OUTPATIENT)
Dept: FAMILY MEDICINE CLINIC | Facility: CLINIC | Age: 57
End: 2022-09-21

## 2022-09-21 VITALS
DIASTOLIC BLOOD PRESSURE: 86 MMHG | SYSTOLIC BLOOD PRESSURE: 146 MMHG | WEIGHT: 293.8 LBS | HEIGHT: 78 IN | TEMPERATURE: 98.4 F | BODY MASS INDEX: 33.99 KG/M2 | HEART RATE: 84 BPM

## 2022-09-21 DIAGNOSIS — N52.03 COMBINED ARTERIAL INSUFFICIENCY AND CORPORO-VENOUS OCCLUSIVE ERECTILE DYSFUNCTION: Chronic | ICD-10-CM

## 2022-09-21 DIAGNOSIS — I48.0 PAROXYSMAL ATRIAL FIBRILLATION: Chronic | ICD-10-CM

## 2022-09-21 DIAGNOSIS — I10 ESSENTIAL HYPERTENSION: Chronic | ICD-10-CM

## 2022-09-21 DIAGNOSIS — D12.6 ADENOMATOUS POLYP OF COLON, UNSPECIFIED PART OF COLON: ICD-10-CM

## 2022-09-21 DIAGNOSIS — E11.69 HYPERLIPIDEMIA ASSOCIATED WITH TYPE 2 DIABETES MELLITUS: Chronic | ICD-10-CM

## 2022-09-21 DIAGNOSIS — E11.9 TYPE 2 DIABETES MELLITUS WITHOUT COMPLICATION, WITHOUT LONG-TERM CURRENT USE OF INSULIN: Primary | Chronic | ICD-10-CM

## 2022-09-21 DIAGNOSIS — Z12.11 COLON CANCER SCREENING: ICD-10-CM

## 2022-09-21 DIAGNOSIS — I50.22 CHRONIC SYSTOLIC CONGESTIVE HEART FAILURE: Chronic | ICD-10-CM

## 2022-09-21 DIAGNOSIS — Z12.5 SPECIAL SCREENING FOR MALIGNANT NEOPLASM OF PROSTATE: ICD-10-CM

## 2022-09-21 DIAGNOSIS — E78.5 HYPERLIPIDEMIA ASSOCIATED WITH TYPE 2 DIABETES MELLITUS: Chronic | ICD-10-CM

## 2022-09-21 DIAGNOSIS — Z79.01 CHRONIC ANTICOAGULATION: Chronic | ICD-10-CM

## 2022-09-21 PROCEDURE — 99214 OFFICE O/P EST MOD 30 MIN: CPT | Performed by: INTERNAL MEDICINE

## 2022-09-21 RX ORDER — SEMAGLUTIDE 1.34 MG/ML
1 INJECTION, SOLUTION SUBCUTANEOUS WEEKLY
Qty: 4 ML | Refills: 6 | Status: SHIPPED | OUTPATIENT
Start: 2022-09-21 | End: 2023-01-12 | Stop reason: SDUPTHER

## 2022-09-21 RX ORDER — ATORVASTATIN CALCIUM 20 MG/1
20 TABLET, FILM COATED ORAL DAILY
Qty: 90 TABLET | Refills: 3 | Status: SHIPPED | OUTPATIENT
Start: 2022-09-21 | End: 2023-03-24 | Stop reason: DRUGHIGH

## 2022-09-21 NOTE — PROGRESS NOTES
Chief Complaint  Follow-up (6 month)    Subjective        History of Present Illness     Praveen Noonan presents to the office for 6-month follow up on multiple complex chronic medical issues including type 2 diabetes mellitus, hyperlipidemia, hypertension, tobacco dependence syndrome, GERD, and mild chronic systolic CHF. His cardiologist is Dr. Hillman who follows his paroxysmal a-fib, for which he continues on Eliquis.  Dr. Hillman intended for patient to increase his Lipitor dose patient from 10 mg to 20 mg of Lipitor daily with a plan to further increase to 40 mg daily if LDL is not at goal.  Patient has not yet increased his dose to 20 mg.  His LDL remains above goal at 85, for which I asked him to go ahead and make the increase to 20 mg q.d. He has a scheduled follow up with Dr. Hillman in March.       Weight is up 19 pounds in the past six months. He was more sedentary for a couple of months due to being out of work with a ruptured left calf muscle sustained with work injury.  Due to his inactivity, he sustained significant weight gain while on leave.  The calf has now healed under the care of an orthopedist and he is now back to work.  Despite this, diabetes has only slightly progressed and remains acceptable with A1c 6.3 with combination of metformin and weekly Ozempic 0.5 mg.  Patient returned to work early last month.  We discussed ways to help get the weight off.  I recommended increasing his dose of Ozempic. He is in agreement.       Patient has a skin tag on the right side of the neck he would like removed.  We will schedule appointment for electrocautery destruction.      Patient had a colonoscopy by Dr. Diehl 07/2019 revealing a polyp in the ascending colon which was a serrated adenoma as well as diverticular disease.  Recommended a 3-year follow up colonoscopy, which is due.  We will refer patient back to Dr. Diehl.    Patient continues to smoke cigarettes.   He is scheduled for repeat lung  "cancer screening CT 09/29/2022. He had lung cancer screening CT 09/2021 revealing no suspicious pulmonary nodules for lung malignancy or acute intrathoracic abnormality, although, there was atelectasis in the right lung.  I advised the patient of the risks in continuing to use tobacco products.  Patient is urged to stop smoking and never start again.    We checked testosterone levels after patient reported increased fatigue and ED.  His testosterone levels are low, although, I explained he would not be a good candidate for testosterone replacement.  He is able to get erections, but not able to sustain an erection.  Viagra is helping adequately. His diuretic and calcium channel blocker can be playing a role in the ED.      The patient's relevant past medical, surgical, and social history was reviewed in Epic.  Lab results are reviewed with the patient today. CBC unremarkable.  Fasting glucose 107. A1c 6.3.  Normal thyroid screen.  Normal renal and liver function.  LDL cholesterol above goal at 85. See above.      Objective   Vital Signs:  /86   Pulse 84   Temp 98.4 °F (36.9 °C) (Tympanic)   Ht 198.1 cm (78\")   Wt 133 kg (293 lb 12.8 oz)   BMI 33.95 kg/m²   Estimated body mass index is 33.95 kg/m² as calculated from the following:    Height as of this encounter: 198.1 cm (78\").    Weight as of this encounter: 133 kg (293 lb 12.8 oz).          Physical Exam  Vitals reviewed.   Constitutional:       General: He is not in acute distress.     Appearance: He is well-developed.      Comments: Pleasant male, obese.    HENT:      Head: Normocephalic and atraumatic.      Nose:      Right Sinus: No maxillary sinus tenderness or frontal sinus tenderness.      Left Sinus: No maxillary sinus tenderness or frontal sinus tenderness.      Mouth/Throat:      Mouth: No oral lesions.      Pharynx: Uvula midline.      Tonsils: No tonsillar exudate.   Eyes:      Conjunctiva/sclera: Conjunctivae normal.      Pupils: Pupils are " equal, round, and reactive to light.   Neck:      Thyroid: No thyroid mass or thyromegaly.      Vascular: No carotid bruit or JVD.      Trachea: Trachea normal. No tracheal deviation.   Cardiovascular:      Rate and Rhythm: Normal rate and regular rhythm.  No extrasystoles are present.     Chest Wall: PMI is not displaced.      Heart sounds: Normal heart sounds. No murmur heard.  Pulmonary:      Effort: Pulmonary effort is normal. No accessory muscle usage or respiratory distress.      Breath sounds: Normal breath sounds. No decreased breath sounds, wheezing, rhonchi or rales.   Abdominal:      General: Bowel sounds are normal. There is no distension.      Palpations: Abdomen is soft.      Tenderness: There is no abdominal tenderness.   Musculoskeletal:      Cervical back: Neck supple.   Lymphadenopathy:      Cervical: No cervical adenopathy.   Skin:     General: Skin is warm and dry.      Findings: No rash.      Nails: There is no clubbing.      Comments: Benign appearing skin tag right side of the neck.    Neurological:      Mental Status: He is alert and oriented to person, place, and time.      Cranial Nerves: No cranial nerve deficit.      Coordination: Coordination normal.   Psychiatric:         Speech: Speech normal.         Behavior: Behavior normal.         Thought Content: Thought content normal.         Judgment: Judgment normal.            Result Review :    CMP    CMP 3/7/22 9/2/22 9/14/22   Glucose 98  107 (A)   Glucose  118 (A)    BUN 16 15 16   Creatinine 0.99 1.2 1.06   Sodium 137 137 138   Potassium 4.0 4.0 4.2   Chloride 102 102 105   Calcium 10.1 10.0 10.1   Albumin 4.20 3.7 4.20   Total Bilirubin 0.3 0.20 0.4   Alkaline Phosphatase 93 115 106   AST (SGOT) 32 24 41   ALT (SGPT) 34 52 48   (A) Abnormal value            CBC w/diff    CBC w/Diff 3/7/22 9/14/22   WBC 6.14 5.86   RBC 4.47 4.64   Hemoglobin 13.5 13.6   Hematocrit 41.1 41.6   MCV 91.9 89.7   MCH 30.2 29.3   MCHC 32.8 32.7   RDW 14.6  14.7   Platelets 253 278   Neutrophil Rel % 37.6 (A) 32.2 (A)   Lymphocyte Rel % 47.6 (A) 51.7 (A)   Monocyte Rel % 11.7 13.0 (A)   Eosinophil Rel % 2.1 2.2   Basophil Rel % 1.0 0.9   (A) Abnormal value            Lipid Panel    Lipid Panel 3/7/22 9/2/22 9/14/22   Total Cholesterol 125 (A) 141 140 (A)   Triglycerides 81 93 141   HDL Cholesterol 38 (A) 34 30 (A)   VLDL Cholesterol 16  25   LDL Cholesterol  71 87 85   LDL/HDL Ratio 1.86  2.73   (A) Abnormal value            TSH    TSH 9/14/22   TSH 1.540           A1C Last 3 Results    HGBA1C Last 3 Results 3/7/22 9/14/22   Hemoglobin A1C 6.00 (A) 6.30 (A)   (A) Abnormal value            PSA    PSA 3/7/22   PSA 0.075           Data reviewed: Radiologic studies lung cancer screening CT 09/2021  and GI studies colonoscopy by Dr. Diehl 07/2019       Future Appointments   Date Time Provider Department Center   9/30/2022  3:00 PM Juarez Angel MD [facility]   3/24/2023  2:00 PM Juarez Angel MD [facility]        Assessment and Plan   Diagnoses and all orders for this visit:    1. Type 2 diabetes mellitus without complication, without long-term current use of insulin (CMS/HCC) - diagnosed 9/2018 (Primary)  -     CBC Auto Differential; Future  -     Comprehensive Metabolic Panel; Future  -     Hemoglobin A1c; Future  -     Lipid Panel; Future  -     Microalbumin / Creatinine Urine Ratio - Urine, Clean Catch; Future  -     Vitamin B12; Future    2. Hyperlipidemia associated with type 2 diabetes mellitus (HCC)  -     Lipid Panel; Future    3. Essential hypertension  -     Comprehensive Metabolic Panel; Future    4. Paroxysmal atrial fibrillation (HCC)  -     Comprehensive Metabolic Panel; Future    5. Chronic systolic congestive heart failure (HCC)  -     Comprehensive Metabolic Panel; Future  -     BNP; Future    6. Colon cancer screening  -     Ambulatory Referral to Gastroenterology    7. Adenomatous polyp of colon, unspecified part of colon  -     Ambulatory  Referral to Gastroenterology    8. Chronic anticoagulation -Eliquis due to atrial fibrillation  -     CBC Auto Differential; Future    9. Combined arterial insufficiency and corporo-venous occlusive erectile dysfunction    10. Special screening for malignant neoplasm of prostate  -     PSA Screen; Future    Other orders  -     atorvastatin (LIPITOR) 20 MG tablet; Take 1 tablet by mouth Daily.  Dispense: 90 tablet; Refill: 3  -     Semaglutide, 1 MG/DOSE, (Ozempic, 1 MG/DOSE,) 4 MG/3ML solution pen-injector; Inject 1 mg under the skin into the appropriate area as directed 1 (One) Time Per Week.  Dispense: 4 mL; Refill: 6                Continue follow up visits and cardiovascular management plan as given by Dr. Hillman, cardiologist.  I advised the patient of the risks in continuing to use tobacco products.  Patient is urged to stop smoking and never start again.  He is scheduled for lung CT screening 09/29/2022.  Continue the Eliquis.  Avoid chronic NSAIDs with the anticoagulation therapy.  Recommended he go ahead and start the whole 20 mg tablet of Lipitor as recommended by Dr. Hillman, cardiologist.       We scheduled appointment for electrocautery destruction of skin tag on neck next week.       To help manage diabetes and help with weight loss, I recommended patient increase his Ozempic dose from 0.5 to .75 mg dose and on up to 1 mg thereafter.  Continue the metformin  850 b.i.d.  Monitor glucose and notify us if not consistently at goal   Pursue low carbohydrate, diabetic diet and increase exercise in combination with the increased dose of weekly Ozempic to help achieve weight loss.    Continue the combination of Lipitor and Zetia to address his hyperlipidemia which is at goal.    Continue Prilosec for GERD, well controlled.    Low testosterone levels demonstrated on labs are consistent with male hypogonadism.  Patient understands he is not a good candidate for testosterone replacement due to his cardiovascular  risk factors. Continue Viagra for ED, which she is helping adequately for now.    Continue current meds for CHF and hypertension.  Chronic CHF is stable.  He was just seen by Dr. Hillman with no changes.  Blood pressure is above goal.  Pursue sodium restriction and weight loss.    Refer back to Dr. Diehl for the 3-year repeat colonoscopy as Dr. Diehl recommended.      Return in six months for routine follow up with fasting labs one week prior and next week for electrocautery destruction of a skin tag on her neck.      Scribed for Dr. Angel by Vandana Gomes [facility].     Follow Up   Return in about 6 months (around 3/21/2023) for Follow up in six months with labs one week prior..  Patient was given instructions and counseling regarding his condition or for health maintenance advice. Please see specific information pulled into the AVS if appropriate.

## 2022-09-30 ENCOUNTER — OFFICE VISIT (OUTPATIENT)
Dept: FAMILY MEDICINE CLINIC | Facility: CLINIC | Age: 57
End: 2022-09-30

## 2022-09-30 VITALS
OXYGEN SATURATION: 98 % | TEMPERATURE: 97.9 F | HEART RATE: 102 BPM | HEIGHT: 78 IN | DIASTOLIC BLOOD PRESSURE: 80 MMHG | BODY MASS INDEX: 33.44 KG/M2 | SYSTOLIC BLOOD PRESSURE: 132 MMHG | WEIGHT: 289 LBS

## 2022-10-04 ENCOUNTER — OFFICE VISIT (OUTPATIENT)
Dept: GASTROENTEROLOGY | Facility: CLINIC | Age: 57
End: 2022-10-04

## 2022-10-04 VITALS
BODY MASS INDEX: 33.44 KG/M2 | HEIGHT: 78 IN | WEIGHT: 289 LBS | DIASTOLIC BLOOD PRESSURE: 72 MMHG | HEART RATE: 86 BPM | SYSTOLIC BLOOD PRESSURE: 124 MMHG

## 2022-10-04 DIAGNOSIS — Z12.11 ENCOUNTER FOR COLONOSCOPY DUE TO HISTORY OF ADENOMATOUS COLONIC POLYPS: Primary | ICD-10-CM

## 2022-10-04 DIAGNOSIS — Z86.010 ENCOUNTER FOR COLONOSCOPY DUE TO HISTORY OF ADENOMATOUS COLONIC POLYPS: Primary | ICD-10-CM

## 2022-10-04 DIAGNOSIS — K21.9 GASTROESOPHAGEAL REFLUX DISEASE, UNSPECIFIED WHETHER ESOPHAGITIS PRESENT: ICD-10-CM

## 2022-10-04 PROCEDURE — 99213 OFFICE O/P EST LOW 20 MIN: CPT | Performed by: PHYSICIAN ASSISTANT

## 2022-10-04 RX ORDER — DEXTROSE AND SODIUM CHLORIDE 5; .45 G/100ML; G/100ML
30 INJECTION, SOLUTION INTRAVENOUS CONTINUOUS PRN
Status: CANCELLED | OUTPATIENT
Start: 2022-11-17

## 2022-10-04 RX ORDER — PANTOPRAZOLE SODIUM 40 MG/1
40 TABLET, DELAYED RELEASE ORAL DAILY
Qty: 30 TABLET | Refills: 5 | Status: SHIPPED | OUTPATIENT
Start: 2022-10-04 | End: 2023-03-24 | Stop reason: DRUGHIGH

## 2022-10-04 NOTE — PROGRESS NOTES
[facility]      Chief Complaint:   Chief Complaint   Patient presents with   • Colonoscopy Screening       Subjective     HPI:   Patient presents for screening colonoscopy due to history of colon polyps.  Patient's last colonoscopy was completed 7/26/2019.  A sessile serrated adenomatous polyp was found in the sigmoid colon.  Patient denies any difficulty with bowel movements, change in bowel movements or bleeding with bowel movements.  Patient denies family history of colon cancer or colon polyps.  Patient reports frequent episodes of GERD symptoms despite taking Prilosec daily.  Patient also reports occasional nausea and vomiting but relates this to mucus in his throat/sinus drainage.    Plan: Patient will be scheduled for screening colonoscopy due to history of adenomatous colon polyp.  Patient declines EGD but is willing to try a different medication to see if that will better control his symptoms.  Change Prilosec to Protonix 40 mg once daily.  Advised to avoid gastric irritants if possible.  Patient will follow-up after colonoscopy has been completed for further recommendations based on findings and to determine if medication change has been beneficial.    Past Medical History:   Past Medical History:   Diagnosis Date   • Atrial fibrillation (HCC)      New onset, 12/2015      • Chronic anticoagulation -Eliquis due to atrial fibrillation 6/7/2021   • Class 1 obesity due to excess calories with serious comorbidity and body mass index (BMI) of 31.0 to 31.9 in adult    • Class 1 obesity due to excess calories with serious comorbidity and body mass index (BMI) of 33.0 to 33.9 in adult    • Congestive heart failure (HCC) 9/7/2021   • Essential hypertension      Lisinopril HCT changed to Toprol-XL due to new onset atrial fibrillation, 12/2015      • GERD (gastroesophageal reflux disease)      On omeprazole Qam and also zantac QHS prn      • Hyperlipidemia      improved with  Zocor. Zocor changed to Lipitor by Dr. blount, 12/2015      • Hyperlipidemia associated with type 2 diabetes mellitus (HCC) 3/15/2022   • Obesity    • Obstructive sleep apnea syndrome - followed by Dr. Gil 8/2/2017   • Osteoarthritis of knee     - R>L      • Paroxysmal atrial fibrillation (HCC)      New onset, 12/2015      • Tobacco dependence syndrome    • Type 2 diabetes mellitus without complication, without long-term current use of insulin (HCC) 9/21/2018       Past Surgical History:  Past Surgical History:   Procedure Laterality Date   • COLONOSCOPY N/A 7/26/2019    Procedure: COLONOSCOPY;  Surgeon: Angel Diehl MD;  Location: [facility] ENDOSCOPY;  Service: Gastroenterology   • INCISION AND DRAINAGE ABSCESS  09/27/2015   • OTHER SURGICAL HISTORY  06/2016    Ablation   • PILONIDAL CYSTECTOMY  2014    Extensive. Spring 2014. Dr. Purcell   • TIBIA FRACTURE SURGERY         Family History:  Family History   Problem Relation Age of Onset   • Hypertension Mother    • Cerebral aneurysm Mother    • Other Mother         Heart Condition   • Hypertension Father    • Coronary artery disease Father         CABG In His 50's   • Prostate cancer Father    • Hypertension Brother    • Diabetes Other        Social History:   reports that he has been smoking cigarettes. He has been smoking about 1.00 pack per day. He has never used smokeless tobacco. He reports that he does not drink alcohol and does not use drugs.    Medications:   Prior to Admission medications    Medication Sig Start Date End Date Taking? Authorizing Provider   apixaban (ELIQUIS) 5 MG tablet tablet Take 5 mg by mouth 2 (Two) Times a Day. 10/11/18  Yes Garland Vicente MD   atorvastatin (LIPITOR) 20 MG tablet Take 1 tablet by mouth Daily. 9/21/22  Yes Juarez Angel MD   dilTIAZem (TIAZAC) 360 MG 24 hr capsule Take 360 mg by mouth Daily.   Yes ProviderGarland MD   glucose blood test strip Check one time daily  E11.9 9/21/18  Yes Juarez Angel MD    glucose monitor monitoring kit Check glucose daily E11.9 9/21/18  Yes Juarez Angel MD   irbesartan (AVAPRO) 300 MG tablet Take 150 mg by mouth Every Night.   Yes ProviderGarland MD   Lancets (FREESTYLE) lancets USE 1  TO CHECK GLUCOSE ONCE DAILY 4/19/19  Yes Juarez Angel MD   metFORMIN (GLUCOPHAGE) 850 MG tablet TAKE 1 TABLET BY MOUTH TWICE DAILY WITH MEALS 7/6/22  Yes Juarez Angel MD   ondansetron ODT (ZOFRAN-ODT) 4 MG disintegrating tablet Place 1 tablet on the tongue Every 8 (Eight) Hours As Needed for Nausea or Vomiting. 9/15/21  Yes Marleen Colindres APRN   oxymetazoline (AFRIN) 0.05 % nasal spray 2 sprays into the nostril(s) as directed by provider 2 (Two) Times a Day. 2/10/20  Yes Joel Salas MD   Semaglutide, 1 MG/DOSE, (Ozempic, 1 MG/DOSE,) 4 MG/3ML solution pen-injector Inject 1 mg under the skin into the appropriate area as directed 1 (One) Time Per Week. 9/21/22  Yes Juarez Angel MD   sildenafil (Viagra) 100 MG tablet 0.5-1 tab po 1 hour prior to sex 3/14/22  Yes Juarez Angel MD   spironolactone-hydrochlorothiazide (ALDACTAZIDE) 25-25 MG tablet Take 1 tablet by mouth daily.   Yes ProviderGarland MD   omeprazole (priLOSEC) 40 MG capsule Take 1 capsule by mouth once daily 11/19/21 10/4/22 Yes Juarez Angel MD   ezetimibe (ZETIA) 10 MG tablet Take 10 mg by mouth Daily. 3/8/21 9/21/22  Emergency, Nurse Isidro RN   nitroglycerin (NITROSTAT) 0.4 MG SL tablet Place 0.4 mg under the tongue. 3/8/21 3/9/22  Emergency, Nurse Isidro RN   pantoprazole (PROTONIX) 40 MG EC tablet Take 1 tablet by mouth Daily. 10/4/22   Italia Slater, PA-C   polyethylene glycol (GoLYTELY) 236 g solution Starting at noon on day prior to procedure, drink 8 ounces every 30 minutes until all gone or stools are clear. May add flavor packet. 10/4/22   Italia Slater, JANAK       Allergies:  Patient has no known allergies.    ROS:    Review of Systems   Constitutional: Negative.  Negative for fever.  "  HENT: Negative.  Negative for trouble swallowing.    Eyes: Negative.    Respiratory: Negative.  Negative for shortness of breath.    Cardiovascular: Negative.  Negative for chest pain.   Gastrointestinal: Positive for abdominal pain (GERD), nausea (rare) and vomiting (rare). Negative for abdominal distention, anal bleeding, blood in stool, constipation, diarrhea and rectal pain.   Endocrine: Negative.    Genitourinary: Negative.    Skin: Negative.    Neurological: Negative.    Hematological: Negative.    Psychiatric/Behavioral: Negative.      Objective     Blood pressure 124/72, pulse 86, height 198.1 cm (78\"), weight 131 kg (289 lb).    Physical Exam  Vitals and nursing note reviewed.   Constitutional:       Appearance: Normal appearance.   HENT:      Head: Normocephalic and atraumatic.      Mouth/Throat:      Mouth: Mucous membranes are moist.      Pharynx: Oropharynx is clear.   Eyes:      General: No scleral icterus.     Conjunctiva/sclera: Conjunctivae normal.   Cardiovascular:      Rate and Rhythm: Normal rate and regular rhythm.   Pulmonary:      Effort: Pulmonary effort is normal.      Breath sounds: Normal breath sounds.   Abdominal:      General: Bowel sounds are normal.      Palpations: Abdomen is soft.      Tenderness: There is no abdominal tenderness. There is no guarding or rebound.   Musculoskeletal:         General: No swelling.   Skin:     General: Skin is warm.      Coloration: Skin is not jaundiced.   Neurological:      General: No focal deficit present.      Mental Status: He is alert.   Psychiatric:         Behavior: Behavior normal.          Assessment & Plan   Diagnoses and all orders for this visit:    1. Encounter for colonoscopy due to history of adenomatous colonic polyps (Primary)  -     Case Request; Standing  -     Case Request    2. Gastroesophageal reflux disease, unspecified whether esophagitis present    Other orders  -     polyethylene glycol (GoLYTELY) 236 g solution; Starting " at noon on day prior to procedure, drink 8 ounces every 30 minutes until all gone or stools are clear. May add flavor packet.  Dispense: 4000 mL; Refill: 0  -     Follow Anesthesia Guidelines / Standing Orders; Future  -     Obtain Informed Consent; Future  -     pantoprazole (PROTONIX) 40 MG EC tablet; Take 1 tablet by mouth Daily.  Dispense: 30 tablet; Refill: 5        COLONOSCOPY (N/A)     Diagnosis Plan   1. Encounter for colonoscopy due to history of adenomatous colonic polyps  Case Request    Case Request   2. Gastroesophageal reflux disease, unspecified whether esophagitis present         Anticipated Surgical Procedure:  Orders Placed This Encounter   Procedures   • Follow Anesthesia Guidelines / Standing Orders     Standing Status:   Future   • Obtain Informed Consent     Standing Status:   Future     Order Specific Question:   Informed Consent Given For     Answer:   colonoscopy       The risks, benefits, and alternatives of this procedure have been discussed with the patient or the responsible party- the patient understands and agrees to proceed.

## 2022-10-21 ENCOUNTER — OFFICE VISIT (OUTPATIENT)
Dept: FAMILY MEDICINE CLINIC | Facility: CLINIC | Age: 57
End: 2022-10-21

## 2022-10-21 VITALS
SYSTOLIC BLOOD PRESSURE: 118 MMHG | BODY MASS INDEX: 33.55 KG/M2 | DIASTOLIC BLOOD PRESSURE: 70 MMHG | HEIGHT: 78 IN | HEART RATE: 92 BPM | TEMPERATURE: 96.7 F | WEIGHT: 290 LBS

## 2022-10-21 DIAGNOSIS — L91.8 SKIN TAGS, MULTIPLE ACQUIRED: Primary | ICD-10-CM

## 2022-10-21 DIAGNOSIS — E66.09 CLASS 1 OBESITY DUE TO EXCESS CALORIES WITH SERIOUS COMORBIDITY AND BODY MASS INDEX (BMI) OF 33.0 TO 33.9 IN ADULT: Chronic | ICD-10-CM

## 2022-10-21 DIAGNOSIS — E11.9 TYPE 2 DIABETES MELLITUS WITHOUT COMPLICATION, WITHOUT LONG-TERM CURRENT USE OF INSULIN: Chronic | ICD-10-CM

## 2022-10-21 DIAGNOSIS — I10 ESSENTIAL HYPERTENSION: Chronic | ICD-10-CM

## 2022-10-21 PROCEDURE — 99214 OFFICE O/P EST MOD 30 MIN: CPT | Performed by: INTERNAL MEDICINE

## 2022-10-21 PROCEDURE — 17110 DESTRUCTION B9 LES UP TO 14: CPT | Performed by: INTERNAL MEDICINE

## 2022-10-21 NOTE — PROGRESS NOTES
"Chief Complaint  skin tag removal, Diabetes, and Hypertension    Subjective        History of Present Illness     Praveen Noonan presents to the office for electrocautery destruction of a total of 8 inflamed skin tags on the neck.  See procedure note below.       BP and HR at goal today in the office.  This has been improved since his visit last month.    1 month ago, I increased Ozempic to 1 mg daily.  He is tolerating that well with good glucose control.  His weight is down 3 pounds since the dose increase.  BMI is still above goal at 33.5.      Objective   Vital Signs:  /70   Pulse 92   Temp 96.7 °F (35.9 °C) (Tympanic)   Ht 198.1 cm (78\")   Wt 132 kg (290 lb)   BMI 33.51 kg/m²   Estimated body mass index is 33.51 kg/m² as calculated from the following:    Height as of this encounter: 198.1 cm (78\").    Weight as of this encounter: 132 kg (290 lb).          Physical Exam  Constitutional:       General: He is not in acute distress.     Appearance: He is well-developed.      Comments: Pleasant male, obese.    HENT:      Head: Normocephalic and atraumatic.      Nose: Nose normal.      Mouth/Throat:      Pharynx: No oropharyngeal exudate.   Eyes:      Pupils: Pupils are equal, round, and reactive to light.   Neck:      Thyroid: No thyromegaly.      Vascular: No JVD.   Cardiovascular:      Rate and Rhythm: Normal rate and regular rhythm.      Heart sounds: Normal heart sounds.   Pulmonary:      Effort: Pulmonary effort is normal. No accessory muscle usage or respiratory distress.      Breath sounds: Normal breath sounds. No wheezing or rales.   Abdominal:      General: Bowel sounds are normal. There is no distension.      Palpations: Abdomen is soft.      Tenderness: There is no abdominal tenderness.   Musculoskeletal:      Cervical back: Neck supple.   Lymphadenopathy:      Cervical: No cervical adenopathy.   Skin:     Comments: 8 inflamed skin tags on the neck destroyed with electrocautery today. "   Neurological:      Mental Status: He is alert and oriented to person, place, and time.      Cranial Nerves: No cranial nerve deficit.   Psychiatric:         Speech: Speech normal.         Behavior: Behavior normal.         Thought Content: Thought content normal.         Judgment: Judgment normal.        Future Appointments   Date Time Provider Department Center   11/28/2022  2:00 PM Italia Slater PA-C [facility] [place]   3/24/2023  2:00 PM Juarez Angel MD [facility]       Result Review :    Common labs    Common Labs 3/7/22 3/7/22 3/7/22 3/7/22 3/7/22 3/7/22 9/2/22 9/2/22 9/14/22 9/14/22 9/14/22 9/14/22    0707 0707 0707 0707 0707 0707 0823 0823 0729 0729 0729 0729   Glucose  98        107 (A)     Glucose       118 (A)        BUN  16     15   16     Creatinine  0.99     1.2   1.06     Sodium  137     137   138     Potassium  4.0     4.0   4.2     Chloride  102     102   105     Calcium  10.1     10.0   10.1     Albumin  4.20     3.7   4.20     Total Bilirubin  0.3     0.20   0.4     Alkaline Phosphatase  93     115   106     AST (SGOT)  32     24   41     ALT (SGPT)  34     52   48     WBC 6.14        5.86      Hemoglobin 13.5        13.6      Hematocrit 41.1        41.6      Platelets 253        278      Total Cholesterol   125 (A)     141   140 (A)    Triglycerides   81     93   141    HDL Cholesterol   38 (A)     34   30 (A)    LDL Cholesterol    71     87   85    Hemoglobin A1C     6.00 (A)       6.30 (A)   Microalbumin, Urine    <1.2           PSA      0.075         (A) Abnormal value               Skin Tag Removal    Date/Time: 10/21/2022 2:30 PM  Performed by: Juarez Angel MD  Authorized by: Juarez Angel MD   Preparation: Patient was prepped and draped in the usual sterile fashion.  Local anesthesia used: yes    Anesthesia:  Local anesthesia used: yes  Local Anesthetic: lidocaine 1% with epinephrine  Anesthetic total: 1.8 mL    Sedation:  Patient sedated: no    Patient tolerance: patient  tolerated the procedure well with no immediate complications  Comments: After obtaining verbal informed consent, skin surrounding skin tags on the neck cleaned with alcohol wipe.  Local anesthesia using lidocaine 1% with epinephrine.  Electrocautery followed by sharp scissor dissection is used to remove/destroyed a total of 8 inflamed skin tags on the neck.  He tolerated procedure well.            Assessment and Plan   Diagnoses and all orders for this visit:    1. Skin tags, multiple acquired (Primary)  -     Skin Tag Removal    2. Essential hypertension    3. Type 2 diabetes mellitus without complication, without long-term current use of insulin (CMS/[facility]) - diagnosed 9/2018    4. Class 1 obesity due to excess calories with serious comorbidity and body mass index (BMI) of 33.0 to 33.9 in adult        Future Appointments   Date Time Provider Department Center   11/28/2022  2:00 PM Italia Slater PA-C [facility] [place]   3/24/2023  2:00 PM Juarez Angel MD [facility]                    Continue current multidrug hypertension management with Avapro, Aldactazide, diltiazem CD.  Continue to pursue sodium restriction and weight loss to help address the obesity as well as hypertension and diabetes    Continue the higher dose of Ozempic 1 mg weekly and continue metformin 850 mg twice daily.  Intensify efforts at diabetic diet, exercise, weight loss.    Return 03/24/2022 for routine follow up with fasting labs one week prior or sooner if needed.       Scribed for Dr. Angel by Vandana Gomes [facility].     Follow Up   Return for Next scheduled follow up.  Patient was given instructions and counseling regarding his condition or for health maintenance advice. Please see specific information pulled into the AVS if appropriate.

## 2022-11-17 ENCOUNTER — ANESTHESIA EVENT (OUTPATIENT)
Dept: GASTROENTEROLOGY | Facility: HOSPITAL | Age: 57
End: 2022-11-17

## 2022-11-17 ENCOUNTER — ANESTHESIA (OUTPATIENT)
Dept: GASTROENTEROLOGY | Facility: HOSPITAL | Age: 57
End: 2022-11-17

## 2022-11-17 ENCOUNTER — HOSPITAL ENCOUNTER (OUTPATIENT)
Facility: HOSPITAL | Age: 57
Setting detail: HOSPITAL OUTPATIENT SURGERY
Discharge: HOME | End: 2022-11-17
Attending: INTERNAL MEDICINE | Admitting: INTERNAL MEDICINE

## 2022-11-17 VITALS
DIASTOLIC BLOOD PRESSURE: 74 MMHG | HEART RATE: 85 BPM | WEIGHT: 283.51 LBS | OXYGEN SATURATION: 96 % | HEIGHT: 78 IN | RESPIRATION RATE: 18 BRPM | TEMPERATURE: 96.2 F | BODY MASS INDEX: 32.8 KG/M2 | SYSTOLIC BLOOD PRESSURE: 122 MMHG

## 2022-11-17 DIAGNOSIS — Z86.010 ENCOUNTER FOR COLONOSCOPY DUE TO HISTORY OF ADENOMATOUS COLONIC POLYPS: ICD-10-CM

## 2022-11-17 DIAGNOSIS — Z12.11 ENCOUNTER FOR COLONOSCOPY DUE TO HISTORY OF ADENOMATOUS COLONIC POLYPS: ICD-10-CM

## 2022-11-17 LAB — GLUCOSE BLDC GLUCOMTR-MCNC: 95 MG/DL (ref 70–130)

## 2022-11-17 PROCEDURE — 88307 TISSUE EXAM BY PATHOLOGIST: CPT

## 2022-11-17 PROCEDURE — 88305 TISSUE EXAM BY PATHOLOGIST: CPT

## 2022-11-17 PROCEDURE — 82962 GLUCOSE BLOOD TEST: CPT

## 2022-11-17 PROCEDURE — 25010000002 PROPOFOL 10 MG/ML EMULSION: Performed by: NURSE ANESTHETIST, CERTIFIED REGISTERED

## 2022-11-17 PROCEDURE — 45385 COLONOSCOPY W/LESION REMOVAL: CPT | Performed by: INTERNAL MEDICINE

## 2022-11-17 DEVICE — CLIPPING DEVICE
Type: IMPLANTABLE DEVICE | Site: CECUM | Status: FUNCTIONAL
Brand: RESOLUTION CLIP

## 2022-11-17 RX ORDER — ONDANSETRON 2 MG/ML
4 INJECTION INTRAMUSCULAR; INTRAVENOUS ONCE AS NEEDED
Status: DISCONTINUED | OUTPATIENT
Start: 2022-11-17 | End: 2022-11-17 | Stop reason: HOSPADM

## 2022-11-17 RX ORDER — DEXTROSE AND SODIUM CHLORIDE 5; .45 G/100ML; G/100ML
30 INJECTION, SOLUTION INTRAVENOUS CONTINUOUS PRN
Status: DISCONTINUED | OUTPATIENT
Start: 2022-11-17 | End: 2022-11-17 | Stop reason: HOSPADM

## 2022-11-17 RX ORDER — PROPOFOL 10 MG/ML
VIAL (ML) INTRAVENOUS AS NEEDED
Status: DISCONTINUED | OUTPATIENT
Start: 2022-11-17 | End: 2022-11-17 | Stop reason: SURG

## 2022-11-17 RX ADMIN — DEXTROSE AND SODIUM CHLORIDE 30 ML/HR: 5; 450 INJECTION, SOLUTION INTRAVENOUS at 10:40

## 2022-11-17 RX ADMIN — PROPOFOL 30 MG: 10 INJECTION, EMULSION INTRAVENOUS at 11:27

## 2022-11-17 RX ADMIN — PROPOFOL 30 MG: 10 INJECTION, EMULSION INTRAVENOUS at 11:23

## 2022-11-17 RX ADMIN — PROPOFOL 30 MG: 10 INJECTION, EMULSION INTRAVENOUS at 11:24

## 2022-11-17 RX ADMIN — PROPOFOL 90 MG: 10 INJECTION, EMULSION INTRAVENOUS at 11:21

## 2022-11-17 RX ADMIN — PROPOFOL 20 MG: 10 INJECTION, EMULSION INTRAVENOUS at 11:31

## 2022-11-17 RX ADMIN — PROPOFOL 20 MG: 10 INJECTION, EMULSION INTRAVENOUS at 11:29

## 2022-11-17 RX ADMIN — PROPOFOL 20 MG: 10 INJECTION, EMULSION INTRAVENOUS at 11:28

## 2022-11-17 RX ADMIN — PROPOFOL 20 MG: 10 INJECTION, EMULSION INTRAVENOUS at 11:33

## 2022-11-17 NOTE — ANESTHESIA PREPROCEDURE EVALUATION
Anesthesia Evaluation     Patient summary reviewed and Nursing notes reviewed   no history of anesthetic complications:  NPO Solid Status: > 8 hours  NPO Liquid Status: > 2 hours           Airway   Mallampati: IV  TM distance: >3 FB  Neck ROM: full  Difficult intubation highly probable and Small opening  Dental - normal exam     Pulmonary - normal exam   (+) a smoker Current Smoked day of surgery, sleep apnea on CPAP,   Cardiovascular - normal exam    PT is on anticoagulation therapy    (+) hypertension well controlled less than 2 medications, dysrhythmias Atrial Fib, CHF Systolic <55%, hyperlipidemia,   (-) CAD    ROS comment: Functional Findings     normal rest and stress wall motion, resting ejection fraction erroneously   lower than actual visual EF.       Summary     1. No abnormal ST/T wave changes with exercise.     2. fixed decreased uptake posterior base, septal apical and  anterior wall   defects  improved on stress views.     3. normal rest and stress wall motion, resting ejection fraction erroneously   lower than actual visual EF.     4. In the view of normal wall motion, the resting or fixed defects are most   likely due to attenuation effects.     5. Medical management and clinical correlation.     6. Cardiac catheterization is recommended. if persistent ischemic symptoms   despite optimal medical therapy.     Recommendations     * Medical management and clinical correlation.     * Cardiac catheterization is recommended. if persistent ischemic symptoms   despite optimal medical therapy.       Neuro/Psych  (+) headaches,    (-) seizures, CVA  GI/Hepatic/Renal/Endo    (+) obesity,  GERD,  diabetes mellitus type 2 well controlled,     Musculoskeletal     Abdominal    Substance History - negative use     OB/GYN          Other   arthritis,      (-) history of cancer  ROS/Med Hx Other: Eliquis last dose 11/14/22                Anesthesia Plan    ASA 3     general   total IV anesthesia  intravenous induction      Anesthetic plan, risks, benefits, and alternatives have been provided, discussed and informed consent has been obtained with: patient.  Pre-procedure education provided      CODE STATUS:

## 2022-11-17 NOTE — ANESTHESIA POSTPROCEDURE EVALUATION
Patient: Praveen Noonan    Procedure Summary     Date: 11/17/22 Room / Location: [facility] ENDOSCOPY 1 / [facility]    Anesthesia Start: 1110 Anesthesia Stop: 1138    Procedure: COLONOSCOPY Diagnosis:       Encounter for colonoscopy due to history of adenomatous colonic polyps      (Encounter for colonoscopy due to history of adenomatous colonic polyps [Z12.11, Z86.010])    Surgeons: Angel Diehl MD Provider: Sana Cody CRNA    Anesthesia Type: general ASA Status: 3          Anesthesia Type: general    Vitals  No vitals data found for the desired time range.          Post Anesthesia Care and Evaluation    Patient location during evaluation: bedside  Patient participation: complete - patient participated  Level of consciousness: sleepy but conscious  Pain score: 0  Pain management: adequate    Airway patency: patent  Anesthetic complications: No anesthetic complications  PONV Status: none  Cardiovascular status: hemodynamically stable  Respiratory status: room air  Hydration status: acceptable

## 2022-11-17 NOTE — H&P
[facility]      Chief Complaint:   No chief complaint on file.      Subjective    I agree with the current note with no changes in the history.    HPI:   Patient presents for screening colonoscopy due to history of colon polyps.  Patient's last colonoscopy was completed 7/26/2019.  A sessile serrated adenomatous polyp was found in the sigmoid colon.  Patient denies any difficulty with bowel movements, change in bowel movements or bleeding with bowel movements.  Patient denies family history of colon cancer or colon polyps.  Patient reports frequent episodes of GERD symptoms despite taking Prilosec daily.  Patient also reports occasional nausea and vomiting but relates this to mucus in his throat/sinus drainage.    Plan: Patient will be scheduled for screening colonoscopy due to history of adenomatous colon polyp.  Patient declines EGD but is willing to try a different medication to see if that will better control his symptoms.  Change Prilosec to Protonix 40 mg once daily.  Advised to avoid gastric irritants if possible.  Patient will follow-up after colonoscopy has been completed for further recommendations based on findings and to determine if medication change has been beneficial.    Past Medical History:   Past Medical History:   Diagnosis Date   • Atrial fibrillation (HCC)      New onset, 12/2015      • Chronic anticoagulation -Eliquis due to atrial fibrillation 6/7/2021   • Class 1 obesity due to excess calories with serious comorbidity and body mass index (BMI) of 31.0 to 31.9 in adult    • Class 1 obesity due to excess calories with serious comorbidity and body mass index (BMI) of 33.0 to 33.9 in adult    • Congestive heart failure (HCC) 9/7/2021   • Essential hypertension      Lisinopril HCT changed to Toprol-XL due to new onset atrial fibrillation, 12/2015      • GERD (gastroesophageal reflux disease)      On omeprazole Qam and also zantac QHS prn      •  Hyperlipidemia      improved with Zocor. Zocor changed to Lipitor by Dr. blount, 12/2015      • Hyperlipidemia associated with type 2 diabetes mellitus (HCC) 3/15/2022   • Obesity    • Obstructive sleep apnea syndrome - followed by Dr. Gil 8/2/2017   • Osteoarthritis of knee     - R>L      • Paroxysmal atrial fibrillation (HCC)      New onset, 12/2015      • Tobacco dependence syndrome    • Type 2 diabetes mellitus without complication, without long-term current use of insulin (HCC) 9/21/2018       Past Surgical History:    Past Surgical History:   Procedure Laterality Date   • COLONOSCOPY N/A 7/26/2019    Procedure: COLONOSCOPY;  Surgeon: Angel Diehl MD;  Location: [facility] ENDOSCOPY;  Service: Gastroenterology   • INCISION AND DRAINAGE ABSCESS  09/27/2015   • OTHER SURGICAL HISTORY  06/2016    Ablation   • PILONIDAL CYSTECTOMY  2014    Extensive. Spring 2014. Dr. Purcell   • TIBIA FRACTURE SURGERY         Family History:  Family History   Problem Relation Age of Onset   • Hypertension Mother    • Cerebral aneurysm Mother    • Other Mother         Heart Condition   • Hypertension Father    • Coronary artery disease Father         CABG In His 50's   • Prostate cancer Father    • Hypertension Brother    • Diabetes Other        Social History:   reports that he has been smoking cigarettes. He has been smoking an average of 1 pack per day. He has never used smokeless tobacco. He reports that he does not drink alcohol and does not use drugs.    Medications:   Prior to Admission medications    Medication Sig Start Date End Date Taking? Authorizing Provider   apixaban (ELIQUIS) 5 MG tablet tablet Take 5 mg by mouth 2 (Two) Times a Day. 10/11/18  Yes Garland Vicente MD   atorvastatin (LIPITOR) 20 MG tablet Take 1 tablet by mouth Daily. 9/21/22  Yes Juarez Angel MD   dilTIAZem (TIAZAC) 360 MG 24 hr capsule Take 360 mg by mouth Daily.   Yes ProviderGarland MD   glucose blood test strip Check one time  daily  E11.9 9/21/18  Yes Juarez Angel MD   glucose monitor monitoring kit Check glucose daily E11.9 9/21/18  Yes Juarez Angel MD   irbesartan (AVAPRO) 300 MG tablet Take 150 mg by mouth Every Night.   Yes ProviderGarland MD   Lancets (FREESTYLE) lancets USE 1  TO CHECK GLUCOSE ONCE DAILY 4/19/19  Yes Juarez Angel MD   metFORMIN (GLUCOPHAGE) 850 MG tablet TAKE 1 TABLET BY MOUTH TWICE DAILY WITH MEALS 7/6/22  Yes Juarez Angel MD   ondansetron ODT (ZOFRAN-ODT) 4 MG disintegrating tablet Place 1 tablet on the tongue Every 8 (Eight) Hours As Needed for Nausea or Vomiting. 9/15/21  Yes Marleen Colindres APRN   oxymetazoline (AFRIN) 0.05 % nasal spray 2 sprays into the nostril(s) as directed by provider 2 (Two) Times a Day. 2/10/20  Yes Joel Salas MD   Semaglutide, 1 MG/DOSE, (Ozempic, 1 MG/DOSE,) 4 MG/3ML solution pen-injector Inject 1 mg under the skin into the appropriate area as directed 1 (One) Time Per Week. 9/21/22  Yes Juarez Angel MD   sildenafil (Viagra) 100 MG tablet 0.5-1 tab po 1 hour prior to sex 3/14/22  Yes Juarez Angel MD   spironolactone-hydrochlorothiazide (ALDACTAZIDE) 25-25 MG tablet Take 1 tablet by mouth daily.   Yes ProviderGarland MD   omeprazole (priLOSEC) 40 MG capsule Take 1 capsule by mouth once daily 11/19/21 10/4/22 Yes Juarez Angel MD   ezetimibe (ZETIA) 10 MG tablet Take 10 mg by mouth Daily. 3/8/21 9/21/22  Emergency, Nurse LASHANDA Felix   nitroglycerin (NITROSTAT) 0.4 MG SL tablet Place 0.4 mg under the tongue. 3/8/21 3/9/22  Emergency, Nurse Isidro RN   pantoprazole (PROTONIX) 40 MG EC tablet Take 1 tablet by mouth Daily. 10/4/22   Italia Slater, PARichelleC   polyethylene glycol (GoLYTELY) 236 g solution Starting at noon on day prior to procedure, drink 8 ounces every 30 minutes until all gone or stools are clear. May add flavor packet. 10/4/22   Italia Slater PA-C       Allergies:  Patient has no known allergies.    ROS:    Review of Systems  "  Constitutional: Negative.  Negative for fever.   HENT: Negative.  Negative for trouble swallowing.    Eyes: Negative.    Respiratory: Negative.  Negative for shortness of breath.    Cardiovascular: Negative.  Negative for chest pain.   Gastrointestinal: Positive for abdominal pain (GERD), nausea (rare) and vomiting (rare). Negative for abdominal distention, anal bleeding, blood in stool, constipation, diarrhea and rectal pain.   Endocrine: Negative.    Genitourinary: Negative.    Skin: Negative.    Neurological: Negative.    Hematological: Negative.    Psychiatric/Behavioral: Negative.      Objective     Blood pressure 145/76, pulse 99, temperature 96.5 °F (35.8 °C), resp. rate 16, height 198.1 cm (78\"), weight 129 kg (283 lb 8.2 oz), SpO2 97 %.    Physical Exam  Vitals and nursing note reviewed.   Constitutional:       Appearance: Normal appearance.   HENT:      Head: Normocephalic and atraumatic.      Mouth/Throat:      Mouth: Mucous membranes are moist.      Pharynx: Oropharynx is clear.   Eyes:      General: No scleral icterus.     Conjunctiva/sclera: Conjunctivae normal.   Cardiovascular:      Rate and Rhythm: Normal rate and regular rhythm.   Pulmonary:      Effort: Pulmonary effort is normal.      Breath sounds: Normal breath sounds.   Abdominal:      General: Bowel sounds are normal.      Palpations: Abdomen is soft.      Tenderness: There is no abdominal tenderness. There is no guarding or rebound.   Musculoskeletal:         General: No swelling.   Skin:     General: Skin is warm.      Coloration: Skin is not jaundiced.   Neurological:      General: No focal deficit present.      Mental Status: He is alert.   Psychiatric:         Behavior: Behavior normal.          Assessment & Plan   Diagnoses and all orders for this visit:    1. Encounter for colonoscopy due to history of adenomatous colonic polyps  -     dextrose 5 % and sodium chloride 0.45 % infusion    Other orders  -     Implement Anesthesia Orders " Day of Procedure; Standing  -     Obtain Informed Consent; Standing  -     POC Glucose Once; Standing  -     Insert Peripheral IV; Standing  -     Implement Anesthesia Orders Day of Procedure  -     Obtain Informed Consent  -     POC Glucose Once  -     Insert Peripheral IV        COLONOSCOPY (N/A)     Diagnosis Plan   1. Encounter for colonoscopy due to history of adenomatous colonic polyps  dextrose 5 % and sodium chloride 0.45 % infusion          Anticipated Surgical Procedure:  Orders Placed This Encounter   Procedures   • Implement Anesthesia Orders Day of Procedure     Standing Status:   Standing     Number of Occurrences:   1   • Obtain Informed Consent     Standing Status:   Standing     Number of Occurrences:   1     Order Specific Question:   Informed Consent Given For     Answer:   colonoscopy   • POC Glucose Once     Prior to Procedure on ALL Diabetic Patients     Standing Status:   Standing     Number of Occurrences:   1     Order Specific Question:   Release to patient     Answer:   Routine Release   • Insert Peripheral IV     Standing Status:   Standing     Number of Occurrences:   1       The risks, benefits, and alternatives of this procedure have been discussed with the patient or the responsible party- the patient understands and agrees to proceed.

## 2022-11-19 LAB — REF LAB TEST METHOD: NORMAL

## 2022-11-28 ENCOUNTER — OFFICE VISIT (OUTPATIENT)
Dept: GASTROENTEROLOGY | Facility: CLINIC | Age: 57
End: 2022-11-28

## 2022-11-28 VITALS
WEIGHT: 290 LBS | BODY MASS INDEX: 33.55 KG/M2 | DIASTOLIC BLOOD PRESSURE: 70 MMHG | HEIGHT: 78 IN | SYSTOLIC BLOOD PRESSURE: 122 MMHG | HEART RATE: 86 BPM

## 2022-11-28 DIAGNOSIS — Z86.010 PERSONAL HISTORY OF COLONIC POLYPS: ICD-10-CM

## 2022-11-28 DIAGNOSIS — K21.9 GASTROESOPHAGEAL REFLUX DISEASE, UNSPECIFIED WHETHER ESOPHAGITIS PRESENT: ICD-10-CM

## 2022-11-28 DIAGNOSIS — D12.6 TUBULAR ADENOMA OF COLON: Primary | ICD-10-CM

## 2022-11-28 DIAGNOSIS — K57.30 DIVERTICULOSIS OF LARGE INTESTINE WITHOUT HEMORRHAGE: ICD-10-CM

## 2022-11-28 PROCEDURE — 99213 OFFICE O/P EST LOW 20 MIN: CPT | Performed by: PHYSICIAN ASSISTANT

## 2022-11-28 NOTE — PROGRESS NOTES
[facility]      Chief Complaint:   Chief Complaint   Patient presents with   • Follow-up       Subjective     HPI:   Presents for follow-up after having colonoscopy completed due to history of colon polyps.  Previous colonoscopy showed a sessile serrated adenomatous polyp in the sigmoid colon.  Patient previously denied family history of colon cancer or colon polyps.  At his last visit, he was changed from Prilosec to Protonix and reports significant improvement in symptoms.    Colonoscopy showed a medium size polyp in the cecum that was completely removed by hot snare.  Patient also was noted to have many small mouth diverticula in the sigmoid, descending and transverse colon.  Pathology indicates colon polyp to be a tubular adenoma.    Plan: For diverticulosis, patient encouraged to use Benefiber or Metamucil to increase fiber intake.  Patient will continue on Protonix 40 mg once daily for his GERD symptoms.  Discussed with him if he has any worsening symptoms or dysphagia to return for consideration for EGD.  He will need repeat colonoscopy in 3 years or sooner for any changes or problems.  He may follow-up as needed prior to that time.    Past Medical History:   Past Medical History:   Diagnosis Date   • Atrial fibrillation (HCC)      New onset, 12/2015      • Chronic anticoagulation -Eliquis due to atrial fibrillation 6/7/2021   • Class 1 obesity due to excess calories with serious comorbidity and body mass index (BMI) of 31.0 to 31.9 in adult    • Class 1 obesity due to excess calories with serious comorbidity and body mass index (BMI) of 33.0 to 33.9 in adult    • Congestive heart failure (HCC) 9/7/2021   • Essential hypertension      Lisinopril HCT changed to Toprol-XL due to new onset atrial fibrillation, 12/2015      • GERD (gastroesophageal reflux disease)      On omeprazole Qam and also zantac QHS prn      • Hyperlipidemia      improved with Zocor. Zocor changed  to Lipitor by Dr. blount, 12/2015      • Hyperlipidemia associated with type 2 diabetes mellitus (HCC) 3/15/2022   • Obesity    • Obstructive sleep apnea syndrome - followed by Dr. Gil 8/2/2017   • Osteoarthritis of knee     - R>L      • Paroxysmal atrial fibrillation (HCC)      New onset, 12/2015      • Tobacco dependence syndrome    • Type 2 diabetes mellitus without complication, without long-term current use of insulin (HCC) 9/21/2018       Past Surgical History:    Past Surgical History:   Procedure Laterality Date   • COLONOSCOPY N/A 7/26/2019    Procedure: COLONOSCOPY;  Surgeon: Angel Diehl MD;  Location: [facility] ENDOSCOPY;  Service: Gastroenterology   • INCISION AND DRAINAGE ABSCESS  09/27/2015   • OTHER SURGICAL HISTORY  06/2016    Ablation   • PILONIDAL CYSTECTOMY  2014    Extensive. Spring 2014. Dr. Purcell   • TIBIA FRACTURE SURGERY         Family History:  Family History   Problem Relation Age of Onset   • Hypertension Mother    • Cerebral aneurysm Mother    • Other Mother         Heart Condition   • Hypertension Father    • Coronary artery disease Father         CABG In His 50's   • Prostate cancer Father    • Hypertension Brother    • Diabetes Other        Social History:   reports that he has been smoking cigarettes. He has been smoking an average of 1 pack per day. He has never used smokeless tobacco. He reports that he does not drink alcohol and does not use drugs.    Medications:   Prior to Admission medications    Medication Sig Start Date End Date Taking? Authorizing Provider   apixaban (ELIQUIS) 5 MG tablet tablet Take 5 mg by mouth 2 (Two) Times a Day. 10/11/18   ProviderGarland MD   atorvastatin (LIPITOR) 20 MG tablet Take 1 tablet by mouth Daily. 9/21/22   Juarez Angel MD   dilTIAZem (TIAZAC) 360 MG 24 hr capsule Take 360 mg by mouth Daily.    ProviderGarland MD   ezetimibe (ZETIA) 10 MG tablet Take 10 mg by mouth Daily. 3/8/21 10/21/22  Emergency, Nurse Isidro, RN    glucose blood test strip Check one time daily  E11.9 9/21/18   Juarez Angel MD   glucose monitor monitoring kit Check glucose daily E11.9 9/21/18   Juarez Angel MD   irbesartan (AVAPRO) 300 MG tablet Take 150 mg by mouth Every Night.    Provider, MD Garland   Lancets (FREESTYLE) lancets USE 1  TO CHECK GLUCOSE ONCE DAILY 4/19/19   Juarez Angel MD   metFORMIN (GLUCOPHAGE) 850 MG tablet TAKE 1 TABLET BY MOUTH TWICE DAILY WITH MEALS 11/4/22   Juarez Angel MD   methylPREDNISolone (MEDROL) 4 MG dose pack Take as directed on package instructions. 11/11/22   Marleen Colindres APRN   nitroglycerin (NITROSTAT) 0.4 MG SL tablet Place 0.4 mg under the tongue. 3/8/21 3/9/22  Emergency, Nurse Isidro, RN   ondansetron ODT (ZOFRAN-ODT) 4 MG disintegrating tablet Place 1 tablet on the tongue Every 8 (Eight) Hours As Needed for Nausea or Vomiting. 9/15/21   Marleen Colindres APRN   oxymetazoline (AFRIN) 0.05 % nasal spray 2 sprays into the nostril(s) as directed by provider 2 (Two) Times a Day. 2/10/20   Joel Salas MD   pantoprazole (PROTONIX) 40 MG EC tablet Take 1 tablet by mouth Daily. 10/4/22   Italia Slater PA-C   Semaglutide, 1 MG/DOSE, (Ozempic, 1 MG/DOSE,) 4 MG/3ML solution pen-injector Inject 1 mg under the skin into the appropriate area as directed 1 (One) Time Per Week. 9/21/22   Juarez Angel MD   sildenafil (Viagra) 100 MG tablet 0.5-1 tab po 1 hour prior to sex 3/14/22   Juarez Angel MD   spironolactone-hydrochlorothiazide (ALDACTAZIDE) 25-25 MG tablet Take 1 tablet by mouth daily.    Provider, MD Garland       Allergies:  Patient has no known allergies.    ROS:    Review of Systems   Constitutional: Negative.  Negative for fever.   HENT: Negative.    Eyes: Negative.    Respiratory: Negative.  Negative for shortness of breath.    Cardiovascular: Negative.  Negative for chest pain.   Gastrointestinal: Negative.    Endocrine: Negative.    Genitourinary: Negative.    Skin: Negative.  "   Neurological: Negative.    Hematological: Negative.    Psychiatric/Behavioral: Negative.      Objective     Blood pressure 122/70, pulse 86, height 198.1 cm (78\"), weight 132 kg (290 lb).    Physical Exam  Vitals and nursing note reviewed.   Constitutional:       Appearance: Normal appearance.   HENT:      Head: Normocephalic and atraumatic.   Pulmonary:      Effort: Pulmonary effort is normal.   Neurological:      General: No focal deficit present.      Mental Status: He is alert.   Psychiatric:         Behavior: Behavior normal.          Assessment & Plan   Diagnoses and all orders for this visit:    1. Tubular adenoma of colon (Primary)    2. Personal history of colonic polyps    3. Gastroesophageal reflux disease, unspecified whether esophagitis present    4. Diverticulosis of large intestine without hemorrhage        * Surgery not found *     Diagnosis Plan   1. Tubular adenoma of colon        2. Personal history of colonic polyps        3. Gastroesophageal reflux disease, unspecified whether esophagitis present        4. Diverticulosis of large intestine without hemorrhage            Anticipated Surgical Procedure:  No orders of the defined types were placed in this encounter.      The risks, benefits, and alternatives of this procedure have been discussed with the patient or the responsible party- the patient understands and agrees to proceed.                                                                "

## 2023-01-12 ENCOUNTER — TELEPHONE (OUTPATIENT)
Dept: FAMILY MEDICINE CLINIC | Facility: CLINIC | Age: 58
End: 2023-01-12
Payer: COMMERCIAL

## 2023-01-12 DIAGNOSIS — E11.9 TYPE 2 DIABETES MELLITUS WITHOUT COMPLICATION, WITHOUT LONG-TERM CURRENT USE OF INSULIN: Primary | Chronic | ICD-10-CM

## 2023-01-12 RX ORDER — SEMAGLUTIDE 1.34 MG/ML
1 INJECTION, SOLUTION SUBCUTANEOUS WEEKLY
Qty: 4 ML | Refills: 6 | Status: SHIPPED | OUTPATIENT
Start: 2023-01-12 | End: 2023-03-24

## 2023-01-12 NOTE — TELEPHONE ENCOUNTER
----- Message from Alyssa Ochoa sent at 1/12/2023 10:47 AM CST -----  Regarding: Changing Pharmacy from [facility] to [facility] in [place]. Needs new script on Ozempic 1mg. Thanks  Semaglutide, 1 MG/DOSE, (Ozempic, 1 MG/DOSE,) 4 MG/3ML solution pen-injector  Medication  Date: 9/21/2022 Department: [facility] Ordering/Authorizing: Juarez Angel MD    Order Providers  Prescribing Provider Encounter Provider  Juarez Angel MD Bandy, Eric L, MD    Sig - Route: Inject 1 mg under the skin into the appropriate area as directed 1 (One) Time Per Week. - Subcutaneous   Sent to pharmacy as: Ozempic (1 MG/DOSE) 4 MG/3ML Subcutaneous Solution Pen-injector (Semaglutide (1 MG/DOSE))   E-Prescribing Status: Receipt confirmed by pharmacy (9/21/2022  3:57 PM CDT)   Pharmacy  [facility] [address] [facility] [phone]  - [phone] FX

## 2023-02-01 DIAGNOSIS — E11.9 TYPE 2 DIABETES MELLITUS WITHOUT COMPLICATION, WITHOUT LONG-TERM CURRENT USE OF INSULIN: Primary | ICD-10-CM

## 2023-03-15 ENCOUNTER — LAB (OUTPATIENT)
Dept: LAB | Facility: OTHER | Age: 58
End: 2023-03-15
Payer: COMMERCIAL

## 2023-03-15 DIAGNOSIS — E78.5 HYPERLIPIDEMIA ASSOCIATED WITH TYPE 2 DIABETES MELLITUS: Chronic | ICD-10-CM

## 2023-03-15 DIAGNOSIS — E11.69 HYPERLIPIDEMIA ASSOCIATED WITH TYPE 2 DIABETES MELLITUS: Chronic | ICD-10-CM

## 2023-03-15 DIAGNOSIS — I48.0 PAROXYSMAL ATRIAL FIBRILLATION: Chronic | ICD-10-CM

## 2023-03-15 DIAGNOSIS — Z79.01 CHRONIC ANTICOAGULATION: Chronic | ICD-10-CM

## 2023-03-15 DIAGNOSIS — E11.9 TYPE 2 DIABETES MELLITUS WITHOUT COMPLICATION, WITHOUT LONG-TERM CURRENT USE OF INSULIN: Chronic | ICD-10-CM

## 2023-03-15 DIAGNOSIS — I50.22 CHRONIC SYSTOLIC CONGESTIVE HEART FAILURE: Chronic | ICD-10-CM

## 2023-03-15 DIAGNOSIS — Z12.5 SPECIAL SCREENING FOR MALIGNANT NEOPLASM OF PROSTATE: ICD-10-CM

## 2023-03-15 DIAGNOSIS — I10 ESSENTIAL HYPERTENSION: Chronic | ICD-10-CM

## 2023-03-15 LAB
ALBUMIN SERPL-MCNC: 4.1 G/DL (ref 3.5–5)
ALBUMIN UR-MCNC: <1.2 MG/DL
ALBUMIN/GLOB SERPL: 1.1 G/DL (ref 1.1–1.8)
ALP SERPL-CCNC: 116 U/L (ref 38–126)
ALT SERPL W P-5'-P-CCNC: 64 U/L
ANION GAP SERPL CALCULATED.3IONS-SCNC: 7 MMOL/L (ref 5–15)
AST SERPL-CCNC: 41 U/L (ref 17–59)
BASOPHILS # BLD AUTO: 0.04 10*3/MM3 (ref 0–0.2)
BASOPHILS NFR BLD AUTO: 0.6 % (ref 0–1.5)
BILIRUB SERPL-MCNC: 0.2 MG/DL (ref 0.2–1.3)
BNP SERPL-MCNC: <5 PG/ML (ref 0–100)
BUN SERPL-MCNC: 14 MG/DL (ref 7–23)
BUN/CREAT SERPL: 13 (ref 7–25)
CALCIUM SPEC-SCNC: 10 MG/DL (ref 8.4–10.2)
CHLORIDE SERPL-SCNC: 102 MMOL/L (ref 101–112)
CHOLEST SERPL-MCNC: 119 MG/DL (ref 150–200)
CO2 SERPL-SCNC: 29 MMOL/L (ref 22–30)
CREAT SERPL-MCNC: 1.08 MG/DL (ref 0.7–1.3)
CREAT UR-MCNC: 191.1 MG/DL
DEPRECATED RDW RBC AUTO: 45.5 FL (ref 37–54)
EGFRCR SERPLBLD CKD-EPI 2021: 80.5 ML/MIN/1.73
EOSINOPHIL # BLD AUTO: 0.18 10*3/MM3 (ref 0–0.4)
EOSINOPHIL NFR BLD AUTO: 2.6 % (ref 0.3–6.2)
ERYTHROCYTE [DISTWIDTH] IN BLOOD BY AUTOMATED COUNT: 13.7 % (ref 12.3–15.4)
GLOBULIN UR ELPH-MCNC: 3.6 GM/DL (ref 2.3–3.5)
GLUCOSE SERPL-MCNC: 129 MG/DL (ref 70–99)
HBA1C MFR BLD: 6.2 % (ref 4.8–5.6)
HCT VFR BLD AUTO: 43.4 % (ref 37.5–51)
HDLC SERPL-MCNC: 33 MG/DL (ref 40–59)
HGB BLD-MCNC: 14.2 G/DL (ref 13–17.7)
LDLC SERPL CALC-MCNC: 65 MG/DL
LDLC/HDLC SERPL: 1.92 {RATIO} (ref 0–3.55)
LYMPHOCYTES # BLD AUTO: 3.68 10*3/MM3 (ref 0.7–3.1)
LYMPHOCYTES NFR BLD AUTO: 52.2 % (ref 19.6–45.3)
MCH RBC QN AUTO: 30.4 PG (ref 26.6–33)
MCHC RBC AUTO-ENTMCNC: 32.7 G/DL (ref 31.5–35.7)
MCV RBC AUTO: 92.9 FL (ref 79–97)
MICROALBUMIN/CREAT UR: NORMAL MG/G{CREAT}
MONOCYTES # BLD AUTO: 0.8 10*3/MM3 (ref 0.1–0.9)
MONOCYTES NFR BLD AUTO: 11.3 % (ref 5–12)
NEUTROPHILS NFR BLD AUTO: 2.35 10*3/MM3 (ref 1.7–7)
NEUTROPHILS NFR BLD AUTO: 33.3 % (ref 42.7–76)
PLATELET # BLD AUTO: 285 10*3/MM3 (ref 140–450)
PMV BLD AUTO: 8.6 FL (ref 6–12)
POTASSIUM SERPL-SCNC: 3.8 MMOL/L (ref 3.4–5)
PROT SERPL-MCNC: 7.7 G/DL (ref 6.3–8.6)
PSA SERPL-MCNC: 0.36 NG/ML (ref 0–4)
RBC # BLD AUTO: 4.67 10*6/MM3 (ref 4.14–5.8)
SODIUM SERPL-SCNC: 138 MMOL/L (ref 137–145)
TRIGL SERPL-MCNC: 113 MG/DL
VIT B12 BLD-MCNC: 536 PG/ML (ref 211–946)
VLDLC SERPL-MCNC: 21 MG/DL (ref 5–40)
WBC NRBC COR # BLD: 7.05 10*3/MM3 (ref 3.4–10.8)

## 2023-03-15 PROCEDURE — 82607 VITAMIN B-12: CPT | Performed by: INTERNAL MEDICINE

## 2023-03-15 PROCEDURE — 83880 ASSAY OF NATRIURETIC PEPTIDE: CPT | Performed by: INTERNAL MEDICINE

## 2023-03-15 PROCEDURE — 82570 ASSAY OF URINE CREATININE: CPT | Performed by: INTERNAL MEDICINE

## 2023-03-15 PROCEDURE — 85025 COMPLETE CBC W/AUTO DIFF WBC: CPT | Performed by: INTERNAL MEDICINE

## 2023-03-15 PROCEDURE — 80053 COMPREHEN METABOLIC PANEL: CPT | Performed by: INTERNAL MEDICINE

## 2023-03-15 PROCEDURE — 36415 COLL VENOUS BLD VENIPUNCTURE: CPT | Performed by: INTERNAL MEDICINE

## 2023-03-15 PROCEDURE — 83036 HEMOGLOBIN GLYCOSYLATED A1C: CPT | Performed by: INTERNAL MEDICINE

## 2023-03-15 PROCEDURE — G0103 PSA SCREENING: HCPCS | Performed by: INTERNAL MEDICINE

## 2023-03-15 PROCEDURE — 82043 UR ALBUMIN QUANTITATIVE: CPT | Performed by: INTERNAL MEDICINE

## 2023-03-15 PROCEDURE — 80061 LIPID PANEL: CPT | Performed by: INTERNAL MEDICINE

## 2023-03-24 ENCOUNTER — OFFICE VISIT (OUTPATIENT)
Dept: FAMILY MEDICINE CLINIC | Facility: CLINIC | Age: 58
End: 2023-03-24
Payer: COMMERCIAL

## 2023-03-24 VITALS
SYSTOLIC BLOOD PRESSURE: 118 MMHG | HEIGHT: 78 IN | OXYGEN SATURATION: 98 % | HEART RATE: 95 BPM | BODY MASS INDEX: 33.46 KG/M2 | TEMPERATURE: 97 F | WEIGHT: 289.2 LBS | DIASTOLIC BLOOD PRESSURE: 70 MMHG

## 2023-03-24 DIAGNOSIS — I10 ESSENTIAL HYPERTENSION: Chronic | ICD-10-CM

## 2023-03-24 DIAGNOSIS — Z79.01 CHRONIC ANTICOAGULATION: Chronic | ICD-10-CM

## 2023-03-24 DIAGNOSIS — K21.9 GASTROESOPHAGEAL REFLUX DISEASE, UNSPECIFIED WHETHER ESOPHAGITIS PRESENT: Chronic | ICD-10-CM

## 2023-03-24 DIAGNOSIS — E78.5 HYPERLIPIDEMIA ASSOCIATED WITH TYPE 2 DIABETES MELLITUS: Chronic | ICD-10-CM

## 2023-03-24 DIAGNOSIS — M16.0 PRIMARY OSTEOARTHRITIS OF BOTH HIPS: Chronic | ICD-10-CM

## 2023-03-24 DIAGNOSIS — R74.01 ELEVATED ALT MEASUREMENT: ICD-10-CM

## 2023-03-24 DIAGNOSIS — M17.0 PRIMARY OSTEOARTHRITIS OF BOTH KNEES: Chronic | ICD-10-CM

## 2023-03-24 DIAGNOSIS — E11.69 HYPERLIPIDEMIA ASSOCIATED WITH TYPE 2 DIABETES MELLITUS: Chronic | ICD-10-CM

## 2023-03-24 DIAGNOSIS — F17.200 TOBACCO DEPENDENCE SYNDROME: Chronic | ICD-10-CM

## 2023-03-24 DIAGNOSIS — E66.09 CLASS 1 OBESITY DUE TO EXCESS CALORIES WITH SERIOUS COMORBIDITY AND BODY MASS INDEX (BMI) OF 33.0 TO 33.9 IN ADULT: Chronic | ICD-10-CM

## 2023-03-24 DIAGNOSIS — I48.0 PAROXYSMAL ATRIAL FIBRILLATION: Chronic | ICD-10-CM

## 2023-03-24 DIAGNOSIS — E11.9 TYPE 2 DIABETES MELLITUS WITHOUT COMPLICATION, WITHOUT LONG-TERM CURRENT USE OF INSULIN: Primary | Chronic | ICD-10-CM

## 2023-03-24 RX ORDER — SEMAGLUTIDE 2.68 MG/ML
2 INJECTION, SOLUTION SUBCUTANEOUS WEEKLY
Qty: 3 ML | Refills: 11 | Status: SHIPPED | OUTPATIENT
Start: 2023-03-24

## 2023-03-24 RX ORDER — ATORVASTATIN CALCIUM 40 MG/1
40 TABLET, FILM COATED ORAL DAILY
COMMUNITY
Start: 2023-03-03

## 2023-03-24 RX ORDER — DILTIAZEM HYDROCHLORIDE 240 MG/1
240 CAPSULE, COATED, EXTENDED RELEASE ORAL EVERY MORNING
COMMUNITY
Start: 2023-01-18

## 2023-03-24 RX ORDER — OMEPRAZOLE 40 MG/1
40 CAPSULE, DELAYED RELEASE ORAL DAILY
COMMUNITY
Start: 2023-03-03

## 2023-03-24 NOTE — PATIENT INSTRUCTIONS
Calorie Counting for Weight Loss  Calories are units of energy. Your body needs a certain number of calories from food to keep going throughout the day. When you eat or drink more calories than your body needs, your body stores the extra calories mostly as fat. When you eat or drink fewer calories than your body needs, your body burns fat to get the energy it needs.  Calorie counting means keeping track of how many calories you eat and drink each day. Calorie counting can be helpful if you need to lose weight. If you eat fewer calories than your body needs, you should lose weight. Ask your health care provider what a healthy weight is for you.  For calorie counting to work, you will need to eat the right number of calories each day to lose a healthy amount of weight per week. A dietitian can help you figure out how many calories you need in a day and will suggest ways to reach your calorie goal.  A healthy amount of weight to lose each week is usually 1-2 lb (0.5-0.9 kg). This usually means that your daily calorie intake should be reduced by 500-750 calories.  Eating 1,200-1,500 calories a day can help most women lose weight.  Eating 1,500-1,800 calories a day can help most men lose weight.  What do I need to know about calorie counting?  Work with your health care provider or dietitian to determine how many calories you should get each day. To meet your daily calorie goal, you will need to:  Find out how many calories are in each food that you would like to eat. Try to do this before you eat.  Decide how much of the food you plan to eat.  Keep a food log. Do this by writing down what you ate and how many calories it had.  To successfully lose weight, it is important to balance calorie counting with a healthy lifestyle that includes regular activity.  Where do I find calorie information?  The number of calories in a food can be found on a Nutrition Facts label. If a food does not have a Nutrition Facts label, try to  look up the calories online or ask your dietitian for help.  Remember that calories are listed per serving. If you choose to have more than one serving of a food, you will have to multiply the calories per serving by the number of servings you plan to eat. For example, the label on a package of bread might say that a serving size is 1 slice and that there are 90 calories in a serving. If you eat 1 slice, you will have eaten 90 calories. If you eat 2 slices, you will have eaten 180 calories.  How do I keep a food log?  After each time that you eat, record the following in your food log as soon as possible:  What you ate. Be sure to include toppings, sauces, and other extras on the food.  How much you ate. This can be measured in cups, ounces, or number of items.  How many calories were in each food and drink.  The total number of calories in the food you ate.  Keep your food log near you, such as in a pocket-sized notebook or on an bird or website on your mobile phone. Some programs will calculate calories for you and show you how many calories you have left to meet your daily goal.  What are some portion-control tips?  Know how many calories are in a serving. This will help you know how many servings you can have of a certain food.  Use a measuring cup to measure serving sizes. You could also try weighing out portions on a kitchen scale. With time, you will be able to estimate serving sizes for some foods.  Take time to put servings of different foods on your favorite plates or in your favorite bowls and cups so you know what a serving looks like.  Try not to eat straight from a food's packaging, such as from a bag or box. Eating straight from the package makes it hard to see how much you are eating and can lead to overeating. Put the amount you would like to eat in a cup or on a plate to make sure you are eating the right portion.  Use smaller plates, glasses, and bowls for smaller portions and to prevent  overeating.  Try not to multitask. For example, avoid watching TV or using your computer while eating. If it is time to eat, sit down at a table and enjoy your food. This will help you recognize when you are full. It will also help you be more mindful of what and how much you are eating.  What are tips for following this plan?  Reading food labels  Check the calorie count compared with the serving size. The serving size may be smaller than what you are used to eating.  Check the source of the calories. Try to choose foods that are high in protein, fiber, and vitamins, and low in saturated fat, trans fat, and sodium.  Shopping  Read nutrition labels while you shop. This will help you make healthy decisions about which foods to buy.  Pay attention to nutrition labels for low-fat or fat-free foods. These foods sometimes have the same number of calories or more calories than the full-fat versions. They also often have added sugar, starch, or salt to make up for flavor that was removed with the fat.  Make a grocery list of lower-calorie foods and stick to it.  Cooking  Try to cook your favorite foods in a healthier way. For example, try baking instead of frying.  Use low-fat dairy products.  Meal planning  Use more fruits and vegetables. One-half of your plate should be fruits and vegetables.  Include lean proteins, such as chicken, turkey, and fish.  Lifestyle  Each week, aim to do one of the followin minutes of moderate exercise, such as walking.  75 minutes of vigorous exercise, such as running.  General information  Know how many calories are in the foods you eat most often. This will help you calculate calorie counts faster.  Find a way of tracking calories that works for you. Get creative. Try different apps or programs if writing down calories does not work for you.  What foods should I eat?    Eat nutritious foods. It is better to have a nutritious, high-calorie food, such as an avocado, than a food with  few nutrients, such as a bag of potato chips.  Use your calories on foods and drinks that will fill you up and will not leave you hungry soon after eating.  Examples of foods that fill you up are nuts and nut butters, vegetables, lean proteins, and high-fiber foods such as whole grains. High-fiber foods are foods with more than 5 g of fiber per serving.  Pay attention to calories in drinks. Low-calorie drinks include water and unsweetened drinks.  The items listed above may not be a complete list of foods and beverages you can eat. Contact a dietitian for more information.  What foods should I limit?  Limit foods or drinks that are not good sources of vitamins, minerals, or protein or that are high in unhealthy fats. These include:  Candy.  Other sweets.  Sodas, specialty coffee drinks, alcohol, and juice.  The items listed above may not be a complete list of foods and beverages you should avoid. Contact a dietitian for more information.  How do I count calories when eating out?  Pay attention to portions. Often, portions are much larger when eating out. Try these tips to keep portions smaller:  Consider sharing a meal instead of getting your own.  If you get your own meal, eat only half of it. Before you start eating, ask for a container and put half of your meal into it.  When available, consider ordering smaller portions from the menu instead of full portions.  Pay attention to your food and drink choices. Knowing the way food is cooked and what is included with the meal can help you eat fewer calories.  If calories are listed on the menu, choose the lower-calorie options.  Choose dishes that include vegetables, fruits, whole grains, low-fat dairy products, and lean proteins.  Choose items that are boiled, broiled, grilled, or steamed. Avoid items that are buttered, battered, fried, or served with cream sauce. Items labeled as crispy are usually fried, unless stated otherwise.  Choose water, low-fat milk,  unsweetened iced tea, or other drinks without added sugar. If you want an alcoholic beverage, choose a lower-calorie option, such as a glass of wine or light beer.  Ask for dressings, sauces, and syrups on the side. These are usually high in calories, so you should limit the amount you eat.  If you want a salad, choose a garden salad and ask for grilled meats. Avoid extra toppings such as chisholm, cheese, or fried items. Ask for the dressing on the side, or ask for olive oil and vinegar or lemon to use as dressing.  Estimate how many servings of a food you are given. Knowing serving sizes will help you be aware of how much food you are eating at restaurants.  Where to find more information  Centers for Disease Control and Prevention: www.cdc.gov  [place] Department of Agriculture: myplate.gov  Summary  Calorie counting means keeping track of how many calories you eat and drink each day. If you eat fewer calories than your body needs, you should lose weight.  A healthy amount of weight to lose per week is usually 1-2 lb (0.5-0.9 kg). This usually means reducing your daily calorie intake by 500-750 calories.  The number of calories in a food can be found on a Nutrition Facts label. If a food does not have a Nutrition Facts label, try to look up the calories online or ask your dietitian for help.  Use smaller plates, glasses, and bowls for smaller portions and to prevent overeating.  Use your calories on foods and drinks that will fill you up and not leave you hungry shortly after a meal.  This information is not intended to replace advice given to you by your health care provider. Make sure you discuss any questions you have with your health care provider.  Document Revised: 01/28/2021 Document Reviewed: 01/28/2021  Elsevier Patient Education © 2022 [employer]

## 2023-03-24 NOTE — PROGRESS NOTES
Chief Complaint  Follow-up (6 month) and Pain (Knees , hips chronic )    Subjective        History of Present Illness     Praveen Noonan presents to the office accompanied by wife, Aimee,  for 6-month follow up on multiple complex chronic medical issues including type 2 diabetes mellitus, hyperlipidemia, hypertension, tobacco dependence syndrome, GERD, and mild chronic systolic CHF and a-fib followed by Dr. Hillman, cardiologist. He continues on Eliquis 5 mg b.i.d.  He recently increased his dose of Lipitor from 20 mg to 40 mg.  He continues Zetia 10 mg daily. Cholesterol numbers improved and at goal.      Patient had colonoscopy 11/17/2022 by Dr. Diehl  revealing a medium size polyp consistent with tubular adenoma and many small mouth diverticula in the sigmoid, descending and transverse colon.  Repeat colonoscopy recommended in 3 years, making him due 11/2025.  Patient's GERD symptoms were not adequately managed with Protonix, for which Dr. Hillmna switched him back to Prilosec 40 mg q.d. with good results.  Constipation improved with daily fiber therapy, metamucil.      Patient is having worsening osteoarthritic pain, especially right hip pain, which is worse since being back to work where he does repetitive motion throughout the day.  He also struggles with bilateral medial knee pain.   He is willing to get imaging and be referred to orthopedist.     Patient's wife felt like she noticed a couple of swollen areas on his breasts, which appears to be fibrocystic changes on exam.  Denies family history of breast cancer.      Patient continues to smoke, for which  I continue to urge smoking cessation.   CT lung screen 09/2022 revealed no evidence of lung cancer.     Weight is down 4 pounds in the past six months.   Diabetes management at goal with current diabetes medications including weekly Ozempic 1 mg, although, he continues to struggle with obesity.  We discussed increasing dose of Ozempic to 2 mg.  He is in  "agreement and will gradually increase his dose.     BP at goal today.      Lab results are reviewed with the patient today. CBC unremarkable.   Fasting glucose 129.  A1c continues to improve at 6.2.  ALT elevated at 64.  B-12 level at goal without oral B-12.   PSA reveals no evidence of prostate cancer.  Normal renal function.  ALT elevated at 64.        Objective   Vital Signs:  /70   Pulse 95   Temp 97 °F (36.1 °C)   Ht 198.1 cm (78\")   Wt 131 kg (289 lb 3.2 oz)   SpO2 98%   BMI 33.42 kg/m²   Estimated body mass index is 33.42 kg/m² as calculated from the following:    Height as of this encounter: 198.1 cm (78\").    Weight as of this encounter: 131 kg (289 lb 3.2 oz).             Physical Exam  Vitals reviewed.   Constitutional:       General: He is not in acute distress.     Appearance: He is well-developed.      Comments: Pleasant male, obese.  Accompanied by wife, Aimee.     HENT:      Head: Normocephalic and atraumatic.      Nose:      Right Sinus: No maxillary sinus tenderness or frontal sinus tenderness.      Left Sinus: No maxillary sinus tenderness or frontal sinus tenderness.      Mouth/Throat:      Mouth: No oral lesions.      Pharynx: Uvula midline.      Tonsils: No tonsillar exudate.   Eyes:      Conjunctiva/sclera: Conjunctivae normal.      Pupils: Pupils are equal, round, and reactive to light.   Neck:      Thyroid: No thyroid mass or thyromegaly.      Vascular: No carotid bruit or JVD.      Trachea: Trachea normal. No tracheal deviation.   Cardiovascular:      Rate and Rhythm: Normal rate and regular rhythm.  No extrasystoles are present.     Chest Wall: PMI is not displaced.      Heart sounds: Normal heart sounds. No murmur heard.  Pulmonary:      Effort: Pulmonary effort is normal. No accessory muscle usage or respiratory distress.      Breath sounds: Normal breath sounds. No decreased breath sounds, wheezing, rhonchi or rales.   Abdominal:      General: Bowel sounds are normal. There " is no distension.      Palpations: Abdomen is soft.      Tenderness: There is no abdominal tenderness.   Genitourinary:     Comments: Fibrocystic changes of breast tissue  Musculoskeletal:      Cervical back: Neck supple.   Feet:      Comments: Pulses 1+-2+ bilateral ankles.   Lymphadenopathy:      Cervical: No cervical adenopathy.   Skin:     General: Skin is warm and dry.      Findings: No rash.      Nails: There is no clubbing.   Neurological:      Mental Status: He is alert and oriented to person, place, and time.      Cranial Nerves: No cranial nerve deficit.      Coordination: Coordination normal.   Psychiatric:         Speech: Speech normal.         Behavior: Behavior normal.         Thought Content: Thought content normal.         Judgment: Judgment normal.            Result Review :    CMP    CMP 9/2/22 9/14/22 3/15/23   Glucose  107 (A) 129 (A)   Glucose 118 (A)     BUN 15 16 14   Creatinine 1.2 1.06 1.08   eGFR  82.9 80.5   Sodium 137 138 138   Potassium 4.0 4.2 3.8   Chloride 102 105 102   Calcium 10.0 10.1 10.0   Total Protein 8.1 7.7 7.7   Albumin 3.7 4.20 4.1   Globulin  3.5 3.6 (A)   Total Bilirubin 0.20 0.4 0.2   Alkaline Phosphatase 115 106 116   AST (SGOT) 24 41 41   ALT (SGPT) 52 48 64 (A)   Albumin/Globulin Ratio  1.2 1.1   BUN/Creatinine Ratio  15.1 13.0   Anion Gap  6.0 7.0   (A) Abnormal value       Comments are available for some flowsheets but are not being displayed.           CBC w/diff    CBC w/Diff 9/14/22 3/15/23   WBC 5.86 7.05   RBC 4.64 4.67   Hemoglobin 13.6 14.2   Hematocrit 41.6 43.4   MCV 89.7 92.9   MCH 29.3 30.4   MCHC 32.7 32.7   RDW 14.7 13.7   Platelets 278 285   Neutrophil Rel % 32.2 (A) 33.3 (A)   Lymphocyte Rel % 51.7 (A) 52.2 (A)   Monocyte Rel % 13.0 (A) 11.3   Eosinophil Rel % 2.2 2.6   Basophil Rel % 0.9 0.6   (A) Abnormal value            Lipid Panel    Lipid Panel 9/2/22 9/14/22 3/15/23   Total Cholesterol 141 140 (A) 119 (A)   Triglycerides 93 141 113   HDL  Cholesterol 34 30 (A) 33 (A)   VLDL Cholesterol  25 21   LDL Cholesterol  87 85 65   LDL/HDL Ratio  2.73 1.92   (A) Abnormal value            TSH    TSH 9/14/22   TSH 1.540           A1C Last 3 Results    HGBA1C Last 3 Results 9/14/22 3/15/23   Hemoglobin A1C 6.30 (A) 6.20 (A)   (A) Abnormal value            PSA    PSA 3/15/23   PSA 0.364           Data reviewed: Radiologic studies CT lung screen 09/2022, Consultant notes Dr. Hillman, cardiologist  and GI studies Colonoscopy 11/17/2022 by Dr. Diehl             Assessment and Plan   Diagnoses and all orders for this visit:    1. Type 2 diabetes mellitus without complication, without long-term current use of insulin (CMS/HCC) - diagnosed 9/2018 (Primary)  -     CBC Auto Differential; Future  -     Comprehensive Metabolic Panel; Future  -     Hemoglobin A1c; Future  -     Lipid Panel; Future  -     TSH; Future    2. Hyperlipidemia associated with type 2 diabetes mellitus (HCC)  -     Lipid Panel; Future    3. Essential hypertension  -     Comprehensive Metabolic Panel; Future    4. Chronic anticoagulation -Eliquis due to atrial fibrillation  -     CBC Auto Differential; Future    5. Gastroesophageal reflux disease, unspecified whether esophagitis present  -     CBC Auto Differential; Future    6. Primary osteoarthritis of both knees  -     XR Knee 3 View Bilateral  -     Ambulatory Referral to Orthopedic Surgery    7. Primary osteoarthritis of both hips  -     XR Hips Bilateral With or Without Pelvis 3-4 View  -     Ambulatory Referral to Orthopedic Surgery    8. Elevated ALT measurement  -     Comprehensive Metabolic Panel; Future    9. Paroxysmal atrial fibrillation (HCC)    10. Tobacco dependence syndrome - 1ppd    11. Class 1 obesity due to excess calories with serious comorbidity and body mass index (BMI) of 33.0 to 33.9 in adult    Other orders  -     Semaglutide, 2 MG/DOSE, (Ozempic, 2 MG/DOSE,) 8 MG/3ML solution pen-injector; Inject 2 mg under the skin into  the appropriate area as directed 1 (One) Time Per Week.  Dispense: 3 mL; Refill: 11           I spent 43 minutes caring for Praveen on this date of service. This time includes time spent by me in the following activities:preparing for the visit, reviewing tests, obtaining and/or reviewing a separately obtained history, performing a medically appropriate examination and/or evaluation , counseling and educating the patient/family/caregiver, ordering medications, tests, or procedures, referring and communicating with other health care professionals , documenting information in the medical record and care coordination with his wife    Orders placed for patient to stop by for x-rays of bilateral hips and knees.  We will notify with results.  Refer to Dr. Roberts for evaluation.  Pursue weight loss.     Overall, diabetes management has continued to improve, although, he continues to be struggle with weight. We will have patient gradually increase the weekly dose of Ozempic from 1 to 2 mg until his 3 remaining doses of 1 mg are depleted and then increase to 2 mg weekly.  Continue metformin.  The increased dose of Ozempic should decrease appetite and allow him to eat smaller portion sizes to achieve weight loss.  Monitor glucose and notify us if not consistently at goal   Pursue low carbohydrate, diabetic diet and increase exercise in combination with the increased dose of weekly Ozempic to help achieve weight loss, which will also help lower elevated ALT.       Continue follow up visits and cardiovascular management plan as given by Dr. Hillman, cardiologist.  Continue Eliquis for atrial fibrillation.  Avoid chronic NSAIDs with the anticoagulation therapy.  Cholesterol improved with the higher dose of Lipitor 40 mg daily. Continue Zetia.  I advised the patient of the risks in continuing to use tobacco products.  Patient is urged to stop smoking and never start again.    He is given assurance of fibrocystic changes of breast  tissue on exam.  He denies family history of breast cancer.  Notify us if he notices this increases of becomes painful.        Continue the Prilosec 40 mg q.a.m.  GERD symptoms did not respond well to Protonix.  Continue daily fiber therapy for management of constipation.      Return in 6 months for routine follow up with fasting labs one week prior or sooner if needed.     Scribed for Dr. Angel by Vandana Gomes [facility].     Follow Up   Return in 6 months (on 9/24/2023) for Next scheduled follow up - labs 1 week prior, Follow up in six months with labs one week prior..  Patient was given instructions and counseling regarding his condition or for health maintenance advice. Please see specific information pulled into the AVS if appropriate.

## 2023-03-28 ENCOUNTER — TELEPHONE (OUTPATIENT)
Dept: FAMILY MEDICINE CLINIC | Facility: CLINIC | Age: 58
End: 2023-03-28
Payer: COMMERCIAL

## 2023-03-28 NOTE — TELEPHONE ENCOUNTER
Cristal Ragsdale MA   3/28/2023 12:15 PM CDT   Contacted the patient and informed him of the message. He stated understanding and thanked me.     Cristal Ragsdale MA   3/28/2023 11:28 AM CDT   Attempted to contact the patient, no answer. Left voice message to contact the office when possible.     Juarez Angel MD   3/27/2023  6:16 PM CDT   Please inform Praveen that his x-rays reveal mild/moderate arthritic changes of both knees and moderate arthritic/degenerative changes of the bilateral hips, right worse than left.  We have referred him to Dr. Roberts, orthopedist, to evaluate the joints.  EB

## 2023-04-10 DIAGNOSIS — M25.561 PAIN IN BOTH KNEES, UNSPECIFIED CHRONICITY: Primary | ICD-10-CM

## 2023-04-10 DIAGNOSIS — M25.562 PAIN IN BOTH KNEES, UNSPECIFIED CHRONICITY: Primary | ICD-10-CM

## 2023-04-20 ENCOUNTER — OFFICE VISIT (OUTPATIENT)
Dept: ORTHOPEDIC SURGERY | Facility: CLINIC | Age: 58
End: 2023-04-20
Payer: COMMERCIAL

## 2023-04-20 VITALS — BODY MASS INDEX: 32.51 KG/M2 | WEIGHT: 281 LBS | HEIGHT: 78 IN

## 2023-04-20 DIAGNOSIS — M23.91 INTERNAL DERANGEMENT OF BOTH KNEES: ICD-10-CM

## 2023-04-20 DIAGNOSIS — M25.561 CHRONIC PAIN OF BOTH KNEES: Primary | ICD-10-CM

## 2023-04-20 DIAGNOSIS — G89.29 CHRONIC PAIN OF BOTH KNEES: Primary | ICD-10-CM

## 2023-04-20 DIAGNOSIS — M23.92 INTERNAL DERANGEMENT OF BOTH KNEES: ICD-10-CM

## 2023-04-20 DIAGNOSIS — I50.22 CHRONIC SYSTOLIC CONGESTIVE HEART FAILURE: Chronic | ICD-10-CM

## 2023-04-20 DIAGNOSIS — Z79.01 CHRONIC ANTICOAGULATION: Chronic | ICD-10-CM

## 2023-04-20 DIAGNOSIS — I10 ESSENTIAL HYPERTENSION: Chronic | ICD-10-CM

## 2023-04-20 DIAGNOSIS — E11.9 TYPE 2 DIABETES MELLITUS WITHOUT COMPLICATION, WITHOUT LONG-TERM CURRENT USE OF INSULIN: Chronic | ICD-10-CM

## 2023-04-20 DIAGNOSIS — M25.562 CHRONIC PAIN OF BOTH KNEES: Primary | ICD-10-CM

## 2023-04-20 RX ORDER — MELOXICAM 15 MG/1
TABLET ORAL
Qty: 30 TABLET | Refills: 3 | Status: SHIPPED | OUTPATIENT
Start: 2023-04-20

## 2023-04-20 NOTE — PROGRESS NOTES
Praveen Noonan is a 58 y.o. male   Primary provider:  Juarez Angel MD       Chief Complaint   Patient presents with   • Left Knee - Initial Evaluation, Pain   • Right Knee - Initial Evaluation, Pain       HISTORY OF PRESENT ILLNESS:  Patient in for initial eval of homero knee pain  Xray completed upon arrival  Patient reports no specific injury.  He is a longtime  and has had intermittent pain over the last several years.  Patient reports some pain with pivoting and twisting.  Mostly he has dull achy pains.  However he does have sharp stabbing pains with twisting.  He does report pain with squatting.  Pain is in both knees.  He has not had any injections, physical therapy, or NSAIDs.  No numbness or tingling.    Knee Pain   Incident onset: 1 month ago. There was no injury mechanism. The pain is present in the left knee and right knee. The quality of the pain is described as aching. The pain is mild. The pain has been intermittent since onset. Associated symptoms comments: swelling. He reports no foreign bodies present. The symptoms are aggravated by movement and weight bearing. He has tried heat, ice and rest for the symptoms.        CONCURRENT MEDICAL HISTORY:    Past Medical History:   Diagnosis Date   • Atrial fibrillation      New onset, 12/2015      • Chronic anticoagulation -Eliquis due to atrial fibrillation 6/7/2021   • Class 1 obesity due to excess calories with serious comorbidity and body mass index (BMI) of 31.0 to 31.9 in adult    • Class 1 obesity due to excess calories with serious comorbidity and body mass index (BMI) of 33.0 to 33.9 in adult    • Congestive heart failure 9/7/2021   • Essential hypertension      Lisinopril HCT changed to Toprol-XL due to new onset atrial fibrillation, 12/2015      • GERD (gastroesophageal reflux disease)      On omeprazole Qam and also zantac QHS prn      • Hyperlipidemia      improved with Zocor. Zocor changed to Lipitor by Dr. blount, 12/2015       • Hyperlipidemia associated with type 2 diabetes mellitus 3/15/2022   • Obesity    • Obstructive sleep apnea syndrome - followed by Dr. Gil 8/2/2017   • Osteoarthritis of knee     - R>L      • Paroxysmal atrial fibrillation      New onset, 12/2015      • Tobacco dependence syndrome    • Type 2 diabetes mellitus without complication, without long-term current use of insulin 9/21/2018       No Known Allergies      Current Outpatient Medications:   •  apixaban (ELIQUIS) 5 MG tablet tablet, Take 1 tablet by mouth 2 (Two) Times a Day., Disp: , Rfl:   •  atorvastatin (LIPITOR) 40 MG tablet, Take 1 tablet by mouth Daily., Disp: , Rfl:   •  dilTIAZem (TIAZAC) 360 MG 24 hr capsule, Take 1 capsule by mouth Daily., Disp: , Rfl:   •  dilTIAZem CD (CARDIZEM CD) 240 MG 24 hr capsule, Take 1 capsule by mouth Every Morning., Disp: , Rfl:   •  ezetimibe (ZETIA) 10 MG tablet, Take 1 tablet by mouth Daily., Disp: , Rfl:   •  irbesartan (AVAPRO) 300 MG tablet, Take 150 mg by mouth Every Night., Disp: , Rfl:   •  meloxicam (MOBIC) 15 MG tablet, 1 PO Daily with food., Disp: 30 tablet, Rfl: 3  •  metFORMIN (GLUCOPHAGE) 850 MG tablet, TAKE 1 TABLET BY MOUTH TWICE DAILY WITH MEALS, Disp: 180 tablet, Rfl: 1  •  nitroglycerin (NITROSTAT) 0.4 MG SL tablet, Place 1 tablet under the tongue., Disp: , Rfl:   •  omeprazole (priLOSEC) 40 MG capsule, Take 1 capsule by mouth Daily., Disp: , Rfl:   •  oxymetazoline (AFRIN) 0.05 % nasal spray, 2 sprays into the nostril(s) as directed by provider 2 (Two) Times a Day., Disp: 15 mL, Rfl: 0  •  Semaglutide, 2 MG/DOSE, (Ozempic, 2 MG/DOSE,) 8 MG/3ML solution pen-injector, Inject 2 mg under the skin into the appropriate area as directed 1 (One) Time Per Week., Disp: 3 mL, Rfl: 11  •  sildenafil (Viagra) 100 MG tablet, 0.5-1 tab po 1 hour prior to sex, Disp: 15 tablet, Rfl: 11  •  spironolactone-hydrochlorothiazide (ALDACTAZIDE) 25-25 MG tablet, Take 1 tablet by mouth Daily., Disp: , Rfl:     Past  "Surgical History:   Procedure Laterality Date   • COLONOSCOPY N/A 7/26/2019    Procedure: COLONOSCOPY;  Surgeon: Angel Diehl MD;  Location: [facility] ENDOSCOPY;  Service: Gastroenterology   • COLONOSCOPY N/A 11/17/2022    Procedure: COLONOSCOPY;  Surgeon: Angel Diehl MD;  Location: [facility] ENDOSCOPY;  Service: Gastroenterology;  Laterality: N/A;   • INCISION AND DRAINAGE ABSCESS  09/27/2015   • OTHER SURGICAL HISTORY  06/2016    Ablation   • PILONIDAL CYSTECTOMY  2014    Extensive. Spring 2014. Dr. Purcell   • TIBIA FRACTURE SURGERY         Family History   Problem Relation Age of Onset   • Hypertension Mother    • Cerebral aneurysm Mother    • Other Mother         Heart Condition   • Hypertension Father    • Coronary artery disease Father         CABG In His 50's   • Prostate cancer Father    • Hypertension Brother    • Diabetes Other        Social History     Socioeconomic History   • Marital status:    Tobacco Use   • Smoking status: Every Day     Packs/day: 1.00     Types: Cigarettes     Passive exposure: Current   • Smokeless tobacco: Never   Substance and Sexual Activity   • Alcohol use: No   • Drug use: No   • Sexual activity: Defer     Comment: .        Review of Systems   Constitutional: Negative for chills and fever.   Respiratory: Negative.    Cardiovascular: Negative.    Musculoskeletal: Positive for joint swelling.   All other systems reviewed and are negative.       PHYSICAL EXAMINATION:       Ht 198.1 cm (78\")   Wt 127 kg (281 lb)   BMI 32.47 kg/m²     Physical Exam  Constitutional:       General: He is not in acute distress.     Appearance: Normal appearance.   Pulmonary:      Effort: Pulmonary effort is normal. No respiratory distress.   Neurological:      Mental Status: He is alert and oriented to person, place, and time.         GAIT:     []  Normal  [x]  Antalgic    Assistive device: [x]  None  []  Walker     []  Crutches  []  Cane     []  Wheelchair  []  Stretcher    Ortho " Exam    He has good range of motion of both knees.  No significant swelling.  He has medial joint line tenderness in both knees.  Stable joint exam to varus and valgus testing as well as anterior drawer testing.  Positive Donal's test on the medial side in both knees.  Good distal pulses and sensation bilaterally.        XR Knee Bilateral AP Standing    Result Date: 4/20/2023  Narrative: Ordering Provider:  Luis Roberts MD Ordering Diagnosis/Indication:  Pain in both knees, unspecified chronicity Procedure:  XR KNEE BILATERAL AP STANDING Exam Date:  4/20/23 COMPARISON:  Todays X-rays were compared to previous images dated March 24, 2023.     Impression:  AP bilateral standing of the knees show acceptable position and alignment with no evidence of acute bony abnormality.  No fracture or dislocation is noted.  Mild medial joint space narrowing noted in the right knee with no significant articular surface changes.  No interval change noted in comparison to prior x-ray.  No acute findings. Luis Roberts MD 4/20/23     XR Knee 3 View Bilateral    Result Date: 3/27/2023  Narrative: EXAM: Bilateral knee series 5 views CLINICAL INDICATION: Pain COMPARISON: No relevant priors available FINDINGS: There is no fracture, dislocation or destructive lesion. There is mild prominence of the bilateral tibial spines and very early osteophytosis of the left medial compartment and right lateral compartment. There is mild sclerosis and spurring of the bilateral patellofemoral joints and bilateral patellar spurring. There is no soft tissue swelling or suprapatellar effusion.     Impression: CONCLUSION: Mild bilateral arthritic changes as above. Electronically signed by:  Ender Nuñez DO  3/27/2023 5:39 AM CDT Workstation: [id number]    XR Hips Bilateral With or Without Pelvis 3-4 View    Result Date: 3/27/2023  Narrative: EXAM: XR HIP 2 OR MORE VIEWS BILATERAL WITH ANTEROPOSTERIOR PELVIS HISTORY: bilateral hip pain,  M16.0 Bilateral primary osteoarthritis of hip. COMPARISON: Lumbar spine radiographs dated January 26, 2017 FINDINGS: Mineralization: Normal Right: Moderate right hip joint space narrowing. Small acetabular osteophyte. No visualized fracture. Left: Moderate left hip joint space narrowing. No visualized fracture. Focal sclerosis of the left SI joint, though unchanged Other: Degenerative changes in the lumbar spine pelvic phleboliths. Calcification in the left mid abdomen and right lower quadrant     Impression: Bilateral hip joint space narrowing, right greater than left Electronically signed by:  Calos Tompkins DO  3/27/2023 6:32 AM CDT Workstation: [id number]          ASSESSMENT:    Diagnoses and all orders for this visit:    Chronic pain of both knees    Chronic systolic congestive heart failure ([facility])    Chronic anticoagulation -Eliquis due to atrial fibrillation    Type 2 diabetes mellitus without complication, without long-term current use of insulin (CMS/[facility]) - diagnosed 9/2018    Essential hypertension    Internal derangement of both knees    Other orders  -     meloxicam (MOBIC) 15 MG tablet; 1 PO Daily with food.          PLAN    Strays show mild arthritic changes in the medial compartment of both knees.  He still maintains reasonable joint spacing however.  He has symptoms that are consistent with potential for meniscal tearing.  He reports that his pains are more annoying than they are limiting.  We discussed a trial of anti-inflammatory medication.  Patient does take chronic anticoagulation with Eliquis due to atrial fibrillation, therefore we chose meloxicam.    We discussed the possibility of increased bleeding risk and a trial of 5 to 7 days on meloxicam and then if his symptoms are improved communicating with his primary/cardiologist to assess risk and benefits with improvement in symptoms versus cardiac/bleeding risks.  We also discussed the possibility of MRI of either knee if his symptoms persist or  worsen.  Slowly progress activity as tolerated.  Follow-up as needed.    Return if symptoms worsen or fail to improve, for recheck.    Luis Roberts MD

## 2023-05-01 RX ORDER — ATORVASTATIN CALCIUM 40 MG/1
40 TABLET, FILM COATED ORAL DAILY
Qty: 90 TABLET | Refills: 1 | Status: SHIPPED | OUTPATIENT
Start: 2023-05-01

## 2023-08-04 DIAGNOSIS — E11.9 TYPE 2 DIABETES MELLITUS WITHOUT COMPLICATION, WITHOUT LONG-TERM CURRENT USE OF INSULIN: ICD-10-CM

## 2023-08-07 ENCOUNTER — PRIOR AUTHORIZATION (OUTPATIENT)
Dept: FAMILY MEDICINE CLINIC | Facility: CLINIC | Age: 58
End: 2023-08-07
Payer: COMMERCIAL

## 2023-08-07 NOTE — TELEPHONE ENCOUNTER
PA for Ozempic was submitted to [facility] ref PA Key [id number]. DX used E11.65 T2 DM

## 2023-08-10 NOTE — TELEPHONE ENCOUNTER
PA for OZEMPIC has been approved, effective until 08/07/2024. Left message regarding approval information.

## 2023-08-28 RX ORDER — OMEPRAZOLE 40 MG/1
40 CAPSULE, DELAYED RELEASE ORAL DAILY
Qty: 90 CAPSULE | Refills: 1 | Status: SHIPPED | OUTPATIENT
Start: 2023-08-28

## 2023-09-25 ENCOUNTER — LAB (OUTPATIENT)
Dept: LAB | Facility: OTHER | Age: 58
End: 2023-09-25
Payer: COMMERCIAL

## 2023-09-25 DIAGNOSIS — E78.5 HYPERLIPIDEMIA ASSOCIATED WITH TYPE 2 DIABETES MELLITUS: Chronic | ICD-10-CM

## 2023-09-25 DIAGNOSIS — I10 ESSENTIAL HYPERTENSION: Chronic | ICD-10-CM

## 2023-09-25 DIAGNOSIS — E11.69 HYPERLIPIDEMIA ASSOCIATED WITH TYPE 2 DIABETES MELLITUS: Chronic | ICD-10-CM

## 2023-09-25 DIAGNOSIS — E11.9 TYPE 2 DIABETES MELLITUS WITHOUT COMPLICATION, WITHOUT LONG-TERM CURRENT USE OF INSULIN: Chronic | ICD-10-CM

## 2023-09-25 DIAGNOSIS — R74.01 ELEVATED ALT MEASUREMENT: ICD-10-CM

## 2023-09-25 DIAGNOSIS — Z79.01 CHRONIC ANTICOAGULATION: Chronic | ICD-10-CM

## 2023-09-25 DIAGNOSIS — K21.9 GASTROESOPHAGEAL REFLUX DISEASE, UNSPECIFIED WHETHER ESOPHAGITIS PRESENT: Chronic | ICD-10-CM

## 2023-09-25 LAB
ALBUMIN SERPL-MCNC: 4 G/DL (ref 3.5–5)
ALBUMIN/GLOB SERPL: 1.1 G/DL (ref 1.1–1.8)
ALP SERPL-CCNC: 97 U/L (ref 38–126)
ALT SERPL W P-5'-P-CCNC: 69 U/L
ANION GAP SERPL CALCULATED.3IONS-SCNC: 6 MMOL/L (ref 5–15)
AST SERPL-CCNC: 47 U/L (ref 17–59)
BASOPHILS # BLD AUTO: 0.07 10*3/MM3 (ref 0–0.2)
BASOPHILS NFR BLD AUTO: 1 % (ref 0–1.5)
BILIRUB SERPL-MCNC: 0.5 MG/DL (ref 0–1.2)
BUN SERPL-MCNC: 15 MG/DL (ref 7–23)
BUN/CREAT SERPL: 12.4 (ref 7–25)
CALCIUM SPEC-SCNC: 10.1 MG/DL (ref 8.4–10.2)
CHLORIDE SERPL-SCNC: 104 MMOL/L (ref 101–112)
CHOLEST SERPL-MCNC: 124 MG/DL (ref 150–200)
CO2 SERPL-SCNC: 30 MMOL/L (ref 22–30)
CREAT SERPL-MCNC: 1.21 MG/DL (ref 0.7–1.3)
DEPRECATED RDW RBC AUTO: 47.2 FL (ref 37–54)
EGFRCR SERPLBLD CKD-EPI 2021: 70.3 ML/MIN/1.73
EOSINOPHIL # BLD AUTO: 0.19 10*3/MM3 (ref 0–0.4)
EOSINOPHIL NFR BLD AUTO: 2.7 % (ref 0.3–6.2)
ERYTHROCYTE [DISTWIDTH] IN BLOOD BY AUTOMATED COUNT: 14.3 % (ref 12.3–15.4)
GLOBULIN UR ELPH-MCNC: 3.5 GM/DL (ref 2.3–3.5)
GLUCOSE SERPL-MCNC: 106 MG/DL (ref 70–99)
HBA1C MFR BLD: 6.8 % (ref 4.8–5.6)
HCT VFR BLD AUTO: 41.4 % (ref 37.5–51)
HDLC SERPL-MCNC: 31 MG/DL (ref 40–59)
HGB BLD-MCNC: 13.6 G/DL (ref 13–17.7)
LDLC SERPL CALC-MCNC: 71 MG/DL
LDLC/HDLC SERPL: 2.23 {RATIO} (ref 0–3.55)
LYMPHOCYTES # BLD AUTO: 3.2 10*3/MM3 (ref 0.7–3.1)
LYMPHOCYTES NFR BLD AUTO: 44.8 % (ref 19.6–45.3)
MCH RBC QN AUTO: 30.7 PG (ref 26.6–33)
MCHC RBC AUTO-ENTMCNC: 32.9 G/DL (ref 31.5–35.7)
MCV RBC AUTO: 93.5 FL (ref 79–97)
MONOCYTES # BLD AUTO: 0.77 10*3/MM3 (ref 0.1–0.9)
MONOCYTES NFR BLD AUTO: 10.8 % (ref 5–12)
NEUTROPHILS NFR BLD AUTO: 2.92 10*3/MM3 (ref 1.7–7)
NEUTROPHILS NFR BLD AUTO: 40.7 % (ref 42.7–76)
PLATELET # BLD AUTO: 245 10*3/MM3 (ref 140–450)
PMV BLD AUTO: 8.2 FL (ref 6–12)
POTASSIUM SERPL-SCNC: 4.1 MMOL/L (ref 3.4–5)
PROT SERPL-MCNC: 7.5 G/DL (ref 6.3–8.6)
RBC # BLD AUTO: 4.43 10*6/MM3 (ref 4.14–5.8)
SODIUM SERPL-SCNC: 140 MMOL/L (ref 137–145)
TRIGL SERPL-MCNC: 119 MG/DL
TSH SERPL DL<=0.05 MIU/L-ACNC: 2.03 UIU/ML (ref 0.27–4.2)
VLDLC SERPL-MCNC: 22 MG/DL (ref 5–40)
WBC NRBC COR # BLD: 7.15 10*3/MM3 (ref 3.4–10.8)

## 2023-09-25 PROCEDURE — 80061 LIPID PANEL: CPT | Performed by: INTERNAL MEDICINE

## 2023-09-25 PROCEDURE — 80050 GENERAL HEALTH PANEL: CPT | Performed by: INTERNAL MEDICINE

## 2023-09-25 PROCEDURE — 36415 COLL VENOUS BLD VENIPUNCTURE: CPT | Performed by: INTERNAL MEDICINE

## 2023-09-25 PROCEDURE — 83036 HEMOGLOBIN GLYCOSYLATED A1C: CPT | Performed by: INTERNAL MEDICINE

## (undated) DEVICE — TRAP SXN POLYP QUICKCATCH LF

## (undated) DEVICE — Device: Brand: DISPOSABLE ELECTROSURGICAL SNARE

## (undated) DEVICE — PATIENT RETURN ELECTRODE, SINGLE-USE, CONTACT QUALITY MONITORING, ADULT, WITH 9FT CORD, FOR PATIENTS WEIGING OVER 33LBS. (15KG): Brand: MEGADYNE